# Patient Record
Sex: MALE | Race: WHITE | Employment: OTHER | URBAN - METROPOLITAN AREA
[De-identification: names, ages, dates, MRNs, and addresses within clinical notes are randomized per-mention and may not be internally consistent; named-entity substitution may affect disease eponyms.]

---

## 2017-02-06 ENCOUNTER — OFFICE VISIT (OUTPATIENT)
Dept: SLEEP MEDICINE | Facility: CLINIC | Age: 62
End: 2017-02-06
Payer: COMMERCIAL

## 2017-02-06 VITALS
OXYGEN SATURATION: 93 % | HEIGHT: 73 IN | WEIGHT: 255 LBS | SYSTOLIC BLOOD PRESSURE: 136 MMHG | HEART RATE: 62 BPM | DIASTOLIC BLOOD PRESSURE: 84 MMHG | BODY MASS INDEX: 33.8 KG/M2

## 2017-02-06 DIAGNOSIS — G47.33 OSA (OBSTRUCTIVE SLEEP APNEA): Primary | ICD-10-CM

## 2017-02-06 PROCEDURE — 99213 OFFICE O/P EST LOW 20 MIN: CPT | Performed by: INTERNAL MEDICINE

## 2017-02-06 NOTE — NURSING NOTE
"Chief Complaint   Patient presents with     RECHECK     f/u cpap       Initial Ht 1.854 m (6' 0.99\")  Wt 115.667 kg (255 lb)  BMI 33.65 kg/m2 Estimated body mass index is 33.65 kg/(m^2) as calculated from the following:    Height as of this encounter: 1.854 m (6' 0.99\").    Weight as of this encounter: 115.667 kg (255 lb).  Medication Reconciliation: complete       Lilo Soto LPN  "

## 2017-02-06 NOTE — PROGRESS NOTES
Name: Humberto Pino MRN# [id number]   Age: 61 year old YOB: 1955     Date : February 6, 2017  Primary care provider: Simeon Greenfield           Chief Complaint:    Sleep apnea           History of Present Illness:     Humberto Pino is a 61 year old male who presents for a follow up of sleep apnea. He was diagnosed with sleep apnea in 2008 PSG from 7/23/2008 done at [place] showed AHI of 5.1 per hour. He has been on CPAP therapy since then. Currently he is on auto PAP therapy with pressure range of 8-15 cm H2O.     Patient reports that his poor sleep was multifactorial with psychophysiologic insomnia related to job stress and positional discomfort with certain sleep positions. 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 His sleep quality has improved. He has also been on a diet for last 6 weeks restricting carbohydrate. He has lost 25 pounds apparently and has better sleep quality and less GI discomfort.     Patient reports that using CPAP benefits him. He has better sleep quality and daytime function when he uses CPAP. At this time, he wants to continue therapy.     Download from his CPAP was reviewed. He demonstrated regular compliance 97% of last 30 days with use greater than 4 hrs. Average use is 8 hrs 24 minutes. Auto PAP 95th percentile pressure is 14.4 cm H2O. Residual AHI is 0.3 per hour.          Medications:     Current Outpatient Prescriptions   Medication Sig     UNABLE TO FIND as needed MEDICATION NAME: Smooth Moves     IBUPROFEN PO Take 200 mg by mouth as needed for moderate pain     ORDER FOR DME Use CPAP as directed by your provider     Fiber TABS 2 tablets as needed      fish oil-omega-3 fatty acids (OMEGA 3) 1000 MG capsule Take 1 g by mouth daily.      azithromycin (ZITHROMAX) 250 MG tablet Two tablets first day, then one tablet daily for four days.     benzonatate (TESSALON) 200 MG capsule Take 1 capsule (200 mg) by mouth 3 times daily as needed for  "cough     Multiple Vitamin (MULTI VITAMIN MENS PO) Take  by mouth.     No current facility-administered medications for this visit.        Allergies   Allergen Reactions     Hay Fever & [A.R.M.] Other (See Comments)     Sneezing & stuffy nose            Past Medical History:     Does not need 02 supplement at night   Past Medical History   Diagnosis Date     Diverticulosis of colon (without mention of hemorrhage)      repeat 2012     ADINA (obstructive sleep apnea)              Past Surgical History:    No h/o  upper airway surgery  Past Surgical History   Procedure Laterality Date     C repair cruciate ligament,knee       both knees-ACL     Hernia repair, umbilical       Toe surgery       feb 2006     Arthroscopy knee  8/4/2011     Procedure:ARTHROSCOPY KNEE; With debridement of medial and lateral meniscus  ; Surgeon:AMARILIS PETERS; Location[facility]            Physical Examination:   /84 mmHg  Pulse 62  Ht 1.854 m (6' 0.99\")  Wt 115.667 kg (255 lb)  BMI 33.65 kg/m2  SpO2 93%              Assessment and Plan:        Obstructive sleep apnea    - Sleep apnea is adequately treated on current CPAP setting. Download demonstrates normal residual AHI and regular use. Continuation of therapy is recommended.     - He was counseled regarding the download findings, Link between weight loss and sleep disordered breathing was reviewed.     _ He can follow up in a year     I spent a total of 20 minutes with this patient with more than 50% in counseling regarding sleep apnea.     Costa Mayer MD, MD 2/6/2017     "

## 2017-02-06 NOTE — PATIENT INSTRUCTIONS
Your BMI is Body mass index is 33.65 kg/(m^2).  Weight management is a personal decision.  If you are interested in exploring weight loss strategies, the following discussion covers the approaches that may be successful. Body mass index (BMI) is one way to tell whether you are at a healthy weight, overweight, or obese. It measures your weight in relation to your height.  A BMI of 18.5 to 24.9 is in the healthy range. A person with a BMI of 25 to 29.9 is considered overweight, and someone with a BMI of 30 or greater is considered obese. More than two-thirds of American adults are considered overweight or obese.  Being overweight or obese increases the risk for further weight gain. Excess weight may lead to heart disease and diabetes.  Creating and following plans for healthy eating and physical activity may help you improve your health.  Weight control is part of healthy lifestyle and includes exercise, emotional health, and healthy eating habits. Careful eating habits lifelong are the mainstay of weight control. Though there are significant health benefits from weight loss, long-term weight loss with diet alone may be very difficult to achieve- studies show long-term success with dietary management in less than 10% of people. Attaining a healthy weight may be especially difficult to achieve in those with severe obesity. In some cases, medications, devices and surgical management might be considered.  What can you do?  If you are overweight or obese and are interested in methods for weight loss, you should discuss this with your provider.     Consider reducing daily calorie intake by 500 calories.     Keep a food journal.     Avoiding skipping meals, consider cutting portions instead.    Diet combined with exercise helps maintain muscle while optimizing fat loss. Strength training is particularly important for building and maintaining muscle mass. Exercise helps reduce stress, increase energy, and improves fitness.  Increasing exercise without diet control, however, may not burn enough calories to loose weight.       Start walking three days a week 10-20 minutes at a time    Work towards walking thirty minutes five days a week     Eventually, increase the speed of your walking for 1-2 minutes at time    In addition, we recommend that you review healthy lifestyles and methods for weight loss available through the National Institutes of Health patient information sites:  http://win.niddk.nih.gov/publications/index.htm    And look into health and wellness programs that may be available through your health insurance provider, employer, local community center, or darryl club.    Weight management plan: Patient was referred to their PCP to discuss a diet and exercise plan.     Your blood pressure was checked while you were in clinic today.  Please read the guidelines below about what these numbers mean and what you should do about them.  Your systolic blood pressure is the top number.  This is the pressure when the heart is pumping.  Your diastolic blood pressure is the bottom number.  This is the pressure in between beats.  If your systolic blood pressure is less than 120 and your diastolic blood pressure is less than 80, then your blood pressure is normal. There is nothing more that you need to do about it  If your systolic blood pressure is 120-139 or your diastolic blood pressure is 80-89, your blood pressure may be higher than it should be.  You should have your blood pressure re-checked within a year by a primary care provider.  If your systolic blood pressure is 140 or greater or your diastolic blood pressure is 90 or greater, you may have high blood pressure.  High blood pressure is treatable, but if left untreated over time it can put you at risk for heart attack, stroke, or kidney failure.  You should have your blood pressure re-checked by a primary care provider within the next four weeks.

## 2017-09-09 ENCOUNTER — HEALTH MAINTENANCE LETTER (OUTPATIENT)
Age: 62
End: 2017-09-09

## 2018-05-21 DIAGNOSIS — G47.33 OSA (OBSTRUCTIVE SLEEP APNEA): Primary | ICD-10-CM

## 2019-09-26 ENCOUNTER — OFFICE VISIT (OUTPATIENT)
Dept: SLEEP MEDICINE | Facility: CLINIC | Age: 64
End: 2019-09-26
Payer: COMMERCIAL

## 2019-09-26 VITALS
SYSTOLIC BLOOD PRESSURE: 135 MMHG | DIASTOLIC BLOOD PRESSURE: 86 MMHG | HEIGHT: 72 IN | RESPIRATION RATE: 16 BRPM | OXYGEN SATURATION: 94 % | BODY MASS INDEX: 34.95 KG/M2 | WEIGHT: 258 LBS | HEART RATE: 70 BPM

## 2019-09-26 DIAGNOSIS — G47.33 OSA (OBSTRUCTIVE SLEEP APNEA): Primary | ICD-10-CM

## 2019-09-26 PROCEDURE — 99213 OFFICE O/P EST LOW 20 MIN: CPT | Performed by: INTERNAL MEDICINE

## 2019-09-26 ASSESSMENT — MIFFLIN-ST. JEOR: SCORE: 2003.28

## 2019-09-26 NOTE — PROGRESS NOTES
Obstructive Sleep Apnea - PAP Follow-Up Visit:    Chief Complaint   Patient presents with     CPAP Follow Up       Humberto Pino comes in today for follow-up of their mild sleep apnea, managed with CPAP.      ADINA was diagnosed at [place] [facility] in 2008 and showed a baseline AHI of 5.1 per hour. He has been on PAP therapy since then,.     Overall, he rates the experience with PAP as 9 (0 poor, 10 great). The mask is comfortable. The mask is not leaking.  He is not snoring with the mask on. He is not having gasp arousals.  He is not having significant oral/nasal dryness. The pressure settings are comfortable.     He uses full-face mask.     Bedtime is typically 9 pm. Usually it takes about 10 minutes to fall asleep with the mask on. Wake time is typically 5:30 am.  Patient is using PAP therapy 9 hours per night. The patient is usually getting 9 hours of sleep per night.    He does feel rested in the morning.    Total score - [place]: 5 (9/26/2019  3:11 PM)    ResMed     Auto-PAP 8-15 cmH2O download:  30/30 total days of use. 0 nonuse days. 30/30 days with >4 hours use.  Average use 8 hours 57 minutes per day. Median Leak 0 L/min. 95%ile Leak 0.4 L/min. CPAP 95% pressure 14.9cm. AHI 1.7    Reviewed by team: Tobacco  Allergies  Meds       Reviewed by provider:        Problem List:  Patient Active Problem List    Diagnosis Date Noted     Stress at work 12/31/2014     Priority: Medium     BMI 30-35 07/31/2014     Priority: Medium     Hallux varus 08/14/2013     Priority: Medium     Advanced directives, counseling/discussion 01/30/2012     Priority: Medium     ADINA (obstructive sleep apnea) 08/12/2008     Priority: Medium          BP (!) 142/90   Pulse 70   Resp 16   Ht 1.829 m (6')   Wt 117 kg (258 lb)   SpO2 94%   BMI 34.99 kg/m      Impression/Plan:     Mild sleep apnea.     -  Tolerating PAP well. Daytime symptoms are improved. Regular compliance and normal AHI is demonstrated on download.     -  Although sleep apnea was mild at baseline, patient has benefited significantly from therapy and will continue PAP therapy.     Plan:     1. Continue auto PAP therapy     Humberto Pino will follow up in about 1 year(s).     Fifteen minutes spent with patient, all of which were spent face-to-face counseling, consulting, coordinating plan of care.      Costa Mayer MD, MD    CC:  Simeon Greenfield,

## 2019-09-26 NOTE — NURSING NOTE
Chief Complaint   Patient presents with     CPAP Follow Up       Initial BP (!) 142/90   Pulse 70   Resp 16   Ht 1.829 m (6')   Wt 117 kg (258 lb)   SpO2 94%   BMI 34.99 kg/m   Estimated body mass index is 34.99 kg/m  as calculated from the following:    Height as of this encounter: 1.829 m (6').    Weight as of this encounter: 117 kg (258 lb).    Medication Reconciliation: complete     ESS 5  Terrie Meyer MA

## 2019-09-26 NOTE — PATIENT INSTRUCTIONS
Your BMI is Body mass index is 34.99 kg/m .  Weight management is a personal decision.  If you are interested in exploring weight loss strategies, the following discussion covers the approaches that may be successful. Body mass index (BMI) is one way to tell whether you are at a healthy weight, overweight, or obese. It measures your weight in relation to your height.  A BMI of 18.5 to 24.9 is in the healthy range. A person with a BMI of 25 to 29.9 is considered overweight, and someone with a BMI of 30 or greater is considered obese. More than two-thirds of American adults are considered overweight or obese.  Being overweight or obese increases the risk for further weight gain. Excess weight may lead to heart disease and diabetes.  Creating and following plans for healthy eating and physical activity may help you improve your health.  Weight control is part of healthy lifestyle and includes exercise, emotional health, and healthy eating habits. Careful eating habits lifelong are the mainstay of weight control. Though there are significant health benefits from weight loss, long-term weight loss with diet alone may be very difficult to achieve- studies show long-term success with dietary management in less than 10% of people. Attaining a healthy weight may be especially difficult to achieve in those with severe obesity. In some cases, medications, devices and surgical management might be considered.  What can you do?  If you are overweight or obese and are interested in methods for weight loss, you should discuss this with your provider.     Consider reducing daily calorie intake by 500 calories.     Keep a food journal.     Avoiding skipping meals, consider cutting portions instead.    Diet combined with exercise helps maintain muscle while optimizing fat loss. Strength training is particularly important for building and maintaining muscle mass. Exercise helps reduce stress, increase energy, and improves fitness.  Increasing exercise without diet control, however, may not burn enough calories to loose weight.       Start walking three days a week 10-20 minutes at a time    Work towards walking thirty minutes five days a week     Eventually, increase the speed of your walking for 1-2 minutes at time    In addition, we recommend that you review healthy lifestyles and methods for weight loss available through the National Institutes of Health patient information sites:  http://win.niddk.nih.gov/publications/index.htm    And look into health and wellness programs that may be available through your health insurance provider, employer, local community center, or darryl club.    Weight management plan: Patient was referred to their PCP to discuss a diet and exercise plan.        Your Body mass index is 34.99 kg/m .  Weight management is a personal decision.  If you are interested in exploring weight loss strategies, the following discussion covers the approaches that may be successful. Body mass index (BMI) is one way to tell whether you are at a healthy weight, overweight, or obese. It measures your weight in relation to your height.  A BMI of 18.5 to 24.9 is in the healthy range. A person with a BMI of 25 to 29.9 is considered overweight, and someone with a BMI of 30 or greater is considered obese. More than two-thirds of American adults are considered overweight or obese.  Being overweight or obese increases the risk for further weight gain. Excess weight may lead to heart disease and diabetes.  Creating and following plans for healthy eating and physical activity may help you improve your health.  Weight control is part of healthy lifestyle and includes exercise, emotional health, and healthy eating habits. Careful eating habits lifelong are the mainstay of weight control. Though there are significant health benefits from weight loss, long-term weight loss with diet alone may be very difficult to achieve- studies show long-term  success with dietary management in less than 10% of people. Attaining a healthy weight may be especially difficult to achieve in those with severe obesity. In some cases, medications, devices and surgical management might be considered.  What can you do?  If you are overweight or obese and are interested in methods for weight loss, you should discuss this with your provider.     Consider reducing daily calorie intake by 500 calories.     Keep a food journal.     Avoiding skipping meals, consider cutting portions instead.    Diet combined with exercise helps maintain muscle while optimizing fat loss. Strength training is particularly important for building and maintaining muscle mass. Exercise helps reduce stress, increase energy, and improves fitness. Increasing exercise without diet control, however, may not burn enough calories to loose weight.       Start walking three days a week 10-20 minutes at a time    Work towards walking thirty minutes five days a week     Eventually, increase the speed of your walking for 1-2 minutes at time    In addition, we recommend that you review healthy lifestyles and methods for weight loss available through the National Institutes of Health patient information sites:  http://win.niddk.nih.gov/publications/index.htm    And look into health and wellness programs that may be available through your health insurance provider, employer, local community center, or darryl club.    {Weight Management Plan -- Delete if patient has a normal BMI:[id number]}

## 2019-10-01 ENCOUNTER — HEALTH MAINTENANCE LETTER (OUTPATIENT)
Age: 64
End: 2019-10-01

## 2019-11-26 ENCOUNTER — TRANSFERRED RECORDS (OUTPATIENT)
Dept: HEALTH INFORMATION MANAGEMENT | Facility: CLINIC | Age: 64
End: 2019-11-26

## 2020-06-29 ENCOUNTER — TELEPHONE (OUTPATIENT)
Dept: SLEEP MEDICINE | Facility: CLINIC | Age: 65
End: 2020-06-29

## 2020-06-30 NOTE — TELEPHONE ENCOUNTER
Patient contacted on the phone.     Patient has mild sleep apnea at baseline. He should be ok to take a break from CPAP until he feels better with neck pain.

## 2021-01-15 ENCOUNTER — HEALTH MAINTENANCE LETTER (OUTPATIENT)
Age: 66
End: 2021-01-15

## 2021-05-13 DIAGNOSIS — G47.33 OBSTRUCTIVE SLEEP APNEA (ADULT) (PEDIATRIC): Primary | ICD-10-CM

## 2021-06-14 ENCOUNTER — VIRTUAL VISIT (OUTPATIENT)
Dept: SLEEP MEDICINE | Facility: CLINIC | Age: 66
End: 2021-06-14
Payer: MEDICARE

## 2021-06-14 VITALS — BODY MASS INDEX: 32.47 KG/M2 | WEIGHT: 245 LBS | HEIGHT: 73 IN

## 2021-06-14 DIAGNOSIS — G47.33 OSA (OBSTRUCTIVE SLEEP APNEA): Primary | ICD-10-CM

## 2021-06-14 PROCEDURE — 99213 OFFICE O/P EST LOW 20 MIN: CPT | Mod: GT | Performed by: INTERNAL MEDICINE

## 2021-06-14 ASSESSMENT — MIFFLIN-ST. JEOR: SCORE: 1950.19

## 2021-06-14 NOTE — PROGRESS NOTES
Does patient have any form of state insurance? N    Do you have wifi? Y  Do you have a smart phone? Y  Can you download an marlen on your phone comfortably with out assistance? Y  Can you watch a Youtube video? Y    Christian is a 66 year old who is being evaluated via a billable video visit.      How would you like to obtain your AVS? MyChart  If the video visit is dropped, the invitation should be resent by: Send to e-mail at: [email or web address]  Will anyone else be joining your video visit? No       Aminata Gastelum, CMA on 6/14/2021 at 9:59 AM    Video Start Time: 11:35 AM  Video-Visit Details    Type of service:  Video Visit    Video End Time:11:55 PM    Originating Location (pt. Location): Home    Distant Location (provider location):  [facility]     Platform used for Video Visit: Parudi    Obstructive Sleep Apnea - PAP Follow-Up Visit:    Chief Complaint   Patient presents with     CPAP Follow Up       Humberto Pino comes in today for follow-up of their mild sleep apnea, managed with CPAP.     ADINA was diagnosed at [facility] sleep clinic in 2008 and showed a baseline AHI of 5.1 per hour. He has been on PAP therapy since then,.     Overall, he rates the experience with PAP as 10 (0 poor, 10 great). The mask is comfortable. The mask is not leaking.  He is not snoring with the mask on. He is not having gasp arousals.  He is not having significant oral/nasal dryness. The pressure settings are comfortable.     He uses full-face mask.     Bedtime is typically 8:30 PM. Usually it takes about 10 minutes to fall asleep with the mask on. Wake time is typically 6 am.  Patient is using PAP therapy 8-9 hours per night. The patient is usually getting 9 hours of sleep per night.    He does feel rested in the morning.    Total score - [place]: 7 (6/10/2021  2:39 PM)    ResMed      Auto-PAP 8-15 cmH2O download:  30/30 total days of use. 0 nonuse days. 100% days with >4 hours use.  Average use 8 hours  "42 minutes per day. Median Leak 0 L/min. 95%ile Leak 3.4 L/min. CPAP 95% pressure 14.9cm. AHI 1.2    Reviewed by team: Tobacco  Allergies  Meds            Reviewed by provider:                Problem List:  Patient Active Problem List    Diagnosis Date Noted     Stress at work 12/31/2014     Priority: Medium     BMI 30-35 07/31/2014     Priority: Medium     Hallux varus 08/14/2013     Priority: Medium     Advanced directives, counseling/discussion 01/30/2012     Priority: Medium     ADINA (obstructive sleep apnea) 08/12/2008     Priority: Medium          Ht 1.854 m (6' 1\")   Wt 111.1 kg (245 lb)   BMI 32.32 kg/m      Impression/Plan:     Mild sleep apnea.     -  Patient has been on successful CPAP therapy since 2008. Although sleep apnea was mild at baseline, he has benefited from treatment and is regularly adherent to therapy. I reviewed data from his CPAP which shows regular compliance and normal AHI.     - He is currently following up with Orthopedics for trigger finger & pain is a disruptor to his sleep.     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     Plan:     1. Continue auto PAP therapy     Humberto Pino will follow up in about 1 year(s).     I spent a total of 25 minutes for this appointment today which include time spent before, during and after the visit for patient care, counseling and coordination of care.      Costa Mayer MD,    CC:  Simeon Greenfield,   "

## 2021-06-14 NOTE — PATIENT INSTRUCTIONS
Your BMI is Body mass index is 32.32 kg/m .  Weight management is a personal decision.  If you are interested in exploring weight loss strategies, the following discussion covers the approaches that may be successful. Body mass index (BMI) is one way to tell whether you are at a healthy weight, overweight, or obese. It measures your weight in relation to your height.  A BMI of 18.5 to 24.9 is in the healthy range. A person with a BMI of 25 to 29.9 is considered overweight, and someone with a BMI of 30 or greater is considered obese. More than two-thirds of American adults are considered overweight or obese.  Being overweight or obese increases the risk for further weight gain. Excess weight may lead to heart disease and diabetes.  Creating and following plans for healthy eating and physical activity may help you improve your health.  Weight control is part of healthy lifestyle and includes exercise, emotional health, and healthy eating habits. Careful eating habits lifelong are the mainstay of weight control. Though there are significant health benefits from weight loss, long-term weight loss with diet alone may be very difficult to achieve- studies show long-term success with dietary management in less than 10% of people. Attaining a healthy weight may be especially difficult to achieve in those with severe obesity. In some cases, medications, devices and surgical management might be considered.  What can you do?  If you are overweight or obese and are interested in methods for weight loss, you should discuss this with your provider.     Consider reducing daily calorie intake by 500 calories.     Keep a food journal.     Avoiding skipping meals, consider cutting portions instead.    Diet combined with exercise helps maintain muscle while optimizing fat loss. Strength training is particularly important for building and maintaining muscle mass. Exercise helps reduce stress, increase energy, and improves fitness.  Increasing exercise without diet control, however, may not burn enough calories to loose weight.       Start walking three days a week 10-20 minutes at a time    Work towards walking thirty minutes five days a week     Eventually, increase the speed of your walking for 1-2 minutes at time    In addition, we recommend that you review healthy lifestyles and methods for weight loss available through the National Institutes of Health patient information sites:  http://win.niddk.nih.gov/publications/index.htm    And look into health and wellness programs that may be available through your health insurance provider, employer, local community center, or darryl club.    Weight management plan: Patient was referred to their PCP to discuss a diet and exercise plan.        Your Body mass index is 32.32 kg/m .  Weight management is a personal decision.  If you are interested in exploring weight loss strategies, the following discussion covers the approaches that may be successful. Body mass index (BMI) is one way to tell whether you are at a healthy weight, overweight, or obese. It measures your weight in relation to your height.  A BMI of 18.5 to 24.9 is in the healthy range. A person with a BMI of 25 to 29.9 is considered overweight, and someone with a BMI of 30 or greater is considered obese. More than two-thirds of American adults are considered overweight or obese.  Being overweight or obese increases the risk for further weight gain. Excess weight may lead to heart disease and diabetes.  Creating and following plans for healthy eating and physical activity may help you improve your health.  Weight control is part of healthy lifestyle and includes exercise, emotional health, and healthy eating habits. Careful eating habits lifelong are the mainstay of weight control. Though there are significant health benefits from weight loss, long-term weight loss with diet alone may be very difficult to achieve- studies show long-term  success with dietary management in less than 10% of people. Attaining a healthy weight may be especially difficult to achieve in those with severe obesity. In some cases, medications, devices and surgical management might be considered.  What can you do?  If you are overweight or obese and are interested in methods for weight loss, you should discuss this with your provider.     Consider reducing daily calorie intake by 500 calories.     Keep a food journal.     Avoiding skipping meals, consider cutting portions instead.    Diet combined with exercise helps maintain muscle while optimizing fat loss. Strength training is particularly important for building and maintaining muscle mass. Exercise helps reduce stress, increase energy, and improves fitness. Increasing exercise without diet control, however, may not burn enough calories to loose weight.       Start walking three days a week 10-20 minutes at a time    Work towards walking thirty minutes five days a week     Eventually, increase the speed of your walking for 1-2 minutes at time    In addition, we recommend that you review healthy lifestyles and methods for weight loss available through the National Institutes of Health patient information sites:  http://win.niddk.nih.gov/publications/index.htm    And look into health and wellness programs that may be available through your health insurance provider, employer, local community center, or darryl club.    {Weight Management Plan -- Delete if patient has a normal BMI:[id number]}

## 2021-07-10 ENCOUNTER — HEALTH MAINTENANCE LETTER (OUTPATIENT)
Age: 66
End: 2021-07-10

## 2021-09-04 ENCOUNTER — HEALTH MAINTENANCE LETTER (OUTPATIENT)
Age: 66
End: 2021-09-04

## 2022-02-11 ASSESSMENT — ACTIVITIES OF DAILY LIVING (ADL): CURRENT_FUNCTION: NO ASSISTANCE NEEDED

## 2022-02-16 ENCOUNTER — OFFICE VISIT (OUTPATIENT)
Dept: INTERNAL MEDICINE | Facility: CLINIC | Age: 67
End: 2022-02-16
Payer: COMMERCIAL

## 2022-02-16 VITALS
OXYGEN SATURATION: 96 % | HEART RATE: 64 BPM | BODY MASS INDEX: 30.75 KG/M2 | DIASTOLIC BLOOD PRESSURE: 62 MMHG | HEIGHT: 73 IN | WEIGHT: 232 LBS | TEMPERATURE: 98.6 F | SYSTOLIC BLOOD PRESSURE: 122 MMHG

## 2022-02-16 DIAGNOSIS — Z12.11 SCREEN FOR COLON CANCER: ICD-10-CM

## 2022-02-16 DIAGNOSIS — Z23 IMMUNIZATION DUE: ICD-10-CM

## 2022-02-16 DIAGNOSIS — Z00.00 ENCOUNTER FOR MEDICARE ANNUAL WELLNESS EXAM: ICD-10-CM

## 2022-02-16 DIAGNOSIS — Z13.220 SCREENING FOR HYPERLIPIDEMIA: ICD-10-CM

## 2022-02-16 DIAGNOSIS — Z00.00 WELCOME TO MEDICARE PREVENTIVE VISIT: Primary | ICD-10-CM

## 2022-02-16 DIAGNOSIS — G47.33 OSA (OBSTRUCTIVE SLEEP APNEA): ICD-10-CM

## 2022-02-16 DIAGNOSIS — Z11.4 SCREENING FOR HIV (HUMAN IMMUNODEFICIENCY VIRUS): ICD-10-CM

## 2022-02-16 LAB
ALBUMIN SERPL-MCNC: 4.3 G/DL (ref 3.5–5)
ALP SERPL-CCNC: 59 U/L (ref 45–120)
ALT SERPL W P-5'-P-CCNC: 17 U/L (ref 0–45)
ANION GAP SERPL CALCULATED.3IONS-SCNC: 9 MMOL/L (ref 5–18)
AST SERPL W P-5'-P-CCNC: 22 U/L (ref 0–40)
BILIRUB SERPL-MCNC: 0.9 MG/DL (ref 0–1)
BUN SERPL-MCNC: 14 MG/DL (ref 8–22)
CALCIUM SERPL-MCNC: 9.7 MG/DL (ref 8.5–10.5)
CHLORIDE BLD-SCNC: 103 MMOL/L (ref 98–107)
CHOLEST SERPL-MCNC: 142 MG/DL
CO2 SERPL-SCNC: 28 MMOL/L (ref 22–31)
CREAT SERPL-MCNC: 1 MG/DL (ref 0.7–1.3)
FASTING STATUS PATIENT QL REPORTED: NO
GFR SERPL CREATININE-BSD FRML MDRD: 84 ML/MIN/1.73M2
GLUCOSE BLD-MCNC: 89 MG/DL (ref 70–125)
HDLC SERPL-MCNC: 48 MG/DL
HIV 1+2 AB+HIV1 P24 AG SERPL QL IA: NEGATIVE
LDLC SERPL CALC-MCNC: 79 MG/DL
POTASSIUM BLD-SCNC: 4.1 MMOL/L (ref 3.5–5)
PROT SERPL-MCNC: 7.9 G/DL (ref 6–8)
SODIUM SERPL-SCNC: 140 MMOL/L (ref 136–145)
TRIGL SERPL-MCNC: 75 MG/DL

## 2022-02-16 PROCEDURE — G0404 EKG TRACING FOR INITIAL PREV: HCPCS | Performed by: INTERNAL MEDICINE

## 2022-02-16 PROCEDURE — 36415 COLL VENOUS BLD VENIPUNCTURE: CPT | Performed by: INTERNAL MEDICINE

## 2022-02-16 PROCEDURE — G0009 ADMIN PNEUMOCOCCAL VACCINE: HCPCS | Performed by: INTERNAL MEDICINE

## 2022-02-16 PROCEDURE — G0405 EKG INTERPRET & REPORT PREVE: HCPCS | Performed by: INTERNAL MEDICINE

## 2022-02-16 PROCEDURE — 80053 COMPREHEN METABOLIC PANEL: CPT | Performed by: INTERNAL MEDICINE

## 2022-02-16 PROCEDURE — 80061 LIPID PANEL: CPT | Performed by: INTERNAL MEDICINE

## 2022-02-16 PROCEDURE — G0402 INITIAL PREVENTIVE EXAM: HCPCS | Performed by: INTERNAL MEDICINE

## 2022-02-16 PROCEDURE — 90732 PPSV23 VACC 2 YRS+ SUBQ/IM: CPT | Performed by: INTERNAL MEDICINE

## 2022-02-16 PROCEDURE — 87389 HIV-1 AG W/HIV-1&-2 AB AG IA: CPT | Performed by: INTERNAL MEDICINE

## 2022-02-16 ASSESSMENT — ACTIVITIES OF DAILY LIVING (ADL): CURRENT_FUNCTION: NO ASSISTANCE NEEDED

## 2022-02-16 NOTE — PATIENT INSTRUCTIONS
Patient Education   Personalized Prevention Plan  You are due for the preventive services outlined below.  Your care team is available to assist you in scheduling these services.  If you have already completed any of these items, please share that information with your care team to update in your medical record.  Health Maintenance Due   Topic Date Due     ANNUAL REVIEW OF HM ORDERS  Never done     HIV Screening  Never done     Colorectal Cancer Screening  07/05/2017     Cholesterol Lab  04/22/2021     Pneumococcal Vaccine (1 of 1 - PPSV23) Never done     AORTIC ANEURYSM SCREENING (SYSTEM ASSIGNED)  Never done       Urinary Incontinence (Male)    Urinary incontinence means not being able to control the release of urine from the bladder.   Causes  Common causes of urinary incontinence in men include:    Infection    Certain medicines    Aging    Poor pelvic muscle tone    Bladder spasms    Obesity    Trouble urinating and fully emptying the bladder (urinary retention)  Other things that can cause incontinence are:     Nervous system diseases    Diabetes    Sleep apnea    Urinary tract infections    Prostate surgery    Pelvic injury  Constipation and smoking have also been identified as risk factors.   Symptoms    Urge incontinence (overactive bladder). This is a sudden urge to urinate. It occurs even though there may not be much urine in the bladder. The need to urinate often during the night is common. It's due to bladder spasms.    Stress incontinence. This is urine leakage that you can't control. It can occur with sneezing, coughing, and other actions that put stress on the bladder.    Treatment  Treatment depends on what is causing the condition. Bladder infections are treated with antibiotics. Urinary retention is treated with a bladder catheter.   Home care  Follow these guidelines when caring for yourself at home:    Don't have any foods and drinks that may irritate the bladder. This  includes:  ? Chocolate  ? Alcohol  ? Caffeine  ? Carbonated drinks  ? Acidic fruits and juices    Limit fluids to 6 to 8 cups a day.    Lose weight if you are overweight. This will reduce your symptoms.    If advised, do regular pelvic muscle-strengthening exercises such as Kegel exercises.    If needed, wear absorbent pads to catch urine. Change the pads often. This is for good hygiene and to prevent skin and bladder infections.    Bathe daily for good hygiene.    If an antibiotic was prescribed to treat a bladder infection, take it until it's finished. Keep taking it even if you are feeling better. This is to make sure your infection has cleared.    If a catheter was left in place, keep bacteria from getting into the collection bag. Don't disconnect the catheter from the collection bag.    Use a leg band to secure the catheter drainage tube, so it does not pull on the catheter. Drain the collection bag when it becomes full. To do this, use the drain spout at the bottom of the bag. Don't disconnect the bag from the catheter.    Don't pull on or try to remove a catheter. The catheter must be removed by a healthcare provider.    If you smoke, stop. Ask your provider for help if you can't do this on your own.  Follow-up care  Follow up with your healthcare provider, or as advised.  When to get medical advice  Call your healthcare provider right away if any of these occur:    Fever over 100.4 F (38 C), or as directed by your provider    Bladder pain or fullness    Belly swelling, nausea, or vomiting    Back pain    Weakness, dizziness, or fainting    If a catheter was left in place, return if:  ? The catheter falls out  ? The catheter stops draining for 6 hours  ? Your urine gets cloudy or smells bad  Jesús last reviewed this educational content on 1/1/2020 2000-2021 [employer]. All rights reserved. This information is not intended as a substitute for professional medical care. Always follow your  healthcare professional's instructions.

## 2022-02-16 NOTE — PROGRESS NOTES
"SUBJECTIVE:   Humberto Pino is a 66 year old male who presents for Preventive Visit.      Patient has been advised of split billing requirements and indicates understanding: Yes  Are you in the first 12 months of your Medicare coverage?  Yes,  Visual Acuity:  Right Eye: 20/25   Left Eye: 20/25  Both Eyes: 20/20    Healthy Habits:     In general, how would you rate your overall health?  Good    Frequency of exercise:  2-3 days/week    Duration of exercise:  15-30 minutes    Do you usually eat at least 4 servings of fruit and vegetables a day, include whole grains    & fiber and avoid regularly eating high fat or \"junk\" foods?  Yes    Taking medications regularly:  Yes    Medication side effects:  None    Ability to successfully perform activities of daily living:  No assistance needed    Home Safety:  Lack of grab bars in the bathroom    Hearing Impairment:  No hearing concerns    In the past 6 months, have you been bothered by leaking of urine? Yes    In general, how would you rate your overall mental or emotional health?  Good      PHQ-2 Total Score: 0    Additional concerns today:  Yes    Do you feel safe in your environment? Yes    Have you ever done Advance Care Planning? (For example, a Health Directive, POLST, or a discussion with a medical provider or your loved ones about your wishes): No, advance care planning information given to patient to review.  Advanced care planning was discussed at today's visit.       Fall risk  Fallen 2 or more times in the past year?: No  Any fall with injury in the past year?: No    Cognitive Screening   1) Repeat 3 items (Leader, Season, Table)    2) Clock draw: NORMAL  3) 3 item recall: Recalls 3 objects  Results: NORMAL clock, 1-2 items recalled: COGNITIVE IMPAIRMENT LESS LIKELY    Mini-CogTM Copyright MARÍA Hernandez. Licensed by the author for use in [facility]; reprinted with permission ([email or web address]@.[email or web address]). All rights reserved.      Do you have sleep apnea, excessive " "snoring or daytime drowsiness?: yes    Reviewed and updated as needed this visit by clinical staff   Tobacco                Reviewed and updated as needed this visit by Provider                 Social History     Tobacco Use     Smoking status: Never Smoker     Smokeless tobacco: Never Used   Substance Use Topics     Alcohol use: Yes     Alcohol/week: 0.0 standard drinks     Comment: once/week     If you drink alcohol do you typically have >3 drinks per day or >7 drinks per week? No    Alcohol Use 2/11/2022   Prescreen: >3 drinks/day or >7 drinks/week? No   Prescreen: >3 drinks/day or >7 drinks/week? -       Current providers sharing in care for this patient include:   Patient Care Team:  Rajiv Ludwig MD as PCP - General (Internal Medicine)  Costa Mayer MD as Assigned Sleep Provider    The following health maintenance items are reviewed in Epic and correct as of today:  Health Maintenance Due   Topic Date Due     ANNUAL REVIEW OF  ORDERS  Never done     HIV SCREENING  Never done     ADVANCE CARE PLANNING  01/30/2017     COLORECTAL CANCER SCREENING  07/05/2017     LIPID  04/22/2021     MEDICARE ANNUAL WELLNESS VISIT  05/06/2021     FALL RISK ASSESSMENT  Never done     Pneumococcal Vaccine: 65+ Years (1 of 1 - PPSV23) Never done     AORTIC ANEURYSM SCREENING (SYSTEM ASSIGNED)  Never done               Review of Systems  Constitutional, HEENT, cardiovascular, pulmonary, GI, , musculoskeletal, neuro, skin, endocrine and psych systems are negative, except as otherwise noted.    OBJECTIVE:   /62 (BP Location: Left arm, Patient Position: Sitting, Cuff Size: Adult Small)   Pulse 64   Temp 98.6  F (37  C)   Ht 1.842 m (6' 0.5\")   Wt 105.2 kg (232 lb)   SpO2 96%   BMI 31.03 kg/m   Estimated body mass index is 31.03 kg/m  as calculated from the following:    Height as of this encounter: 1.842 m (6' 0.5\").    Weight as of this encounter: 105.2 kg (232 lb).  Physical Exam  GENERAL: healthy, alert and " "no distress  EYES: Eyes grossly normal to inspection, PERRL and conjunctivae and sclerae normal  HENT: ear canals and TM's normal, nose and mouth without ulcers or lesions  NECK: no adenopathy, no asymmetry, masses, or scars and thyroid normal to palpation  RESP: lungs clear to auscultation - no rales, rhonchi or wheezes  CV: regular rate and rhythm, normal S1 S2, no S3 or S4, no murmur, click or rub, no peripheral edema and peripheral pulses strong  ABDOMEN: soft, nontender, no hepatosplenomegaly, no masses and bowel sounds normal  MS: no gross musculoskeletal defects noted, no edema  SKIN: no suspicious lesions or rashes  NEURO: Normal strength and tone, mentation intact and speech normal  PSYCH: mentation appears normal, affect normal/bright    Diagnostic Test Results:  Labs reviewed in Epic    ASSESSMENT / PLAN:   (Z00.00) Welcome to Medicare preventive visit  (primary encounter diagnosis)  Comment: normal  Plan: EKG 12-lead, tracing only, PNEUMOCOCCAL         VACCINE,ADULT,SQ OR IM ([id number]), Comprehensive        metabolic panel        HCD discussed, information packet provided, he will discuss with wife.  Pertinent labs ordered    (G47.33) ADINA (obstructive sleep apnea)  Comment: CPAP for years  Plan: losing weight, hoping that in the future he may be able to stop using it. His original sleep study showed only mild ADINA per patient.      (Z12.11) Screen for colon cancer  Comment: overdue  Plan: Adult Gastro Ref - Procedure Only        Ordered. discussed    COUNSELING:  Reviewed preventive health counseling, as reflected in patient instructions    Estimated body mass index is 31.03 kg/m  as calculated from the following:    Height as of this encounter: 1.842 m (6' 0.5\").    Weight as of this encounter: 105.2 kg (232 lb).    Weight management plan: Discussed healthy diet and exercise guidelines    He reports that he has never smoked. He has never used smokeless tobacco.      Appropriate preventive services were " discussed with this patient, including applicable screening as appropriate for cardiovascular disease, diabetes, osteopenia/osteoporosis, and glaucoma.  As appropriate for age/gender, discussed screening for colorectal cancer, prostate cancer, breast cancer, and cervical cancer. Checklist reviewing preventive services available has been given to the patient.    Reviewed patients plan of care and provided an AVS. The Basic Care Plan (routine screening as documented in Health Maintenance) for Humberto meets the Care Plan requirement. This Care Plan has been established and reviewed with the Patient.    Counseling Resources:  ATP IV Guidelines  Pooled Cohorts Equation Calculator  Breast Cancer Risk Calculator  Breast Cancer: Medication to Reduce Risk  FRAX Risk Assessment  ICSI Preventive Guidelines  Dietary Guidelines for Americans, 2010  USDA's MyPlate  ASA Prophylaxis  Lung CA Screening    Rajiv Ludwig MD  [facility]    Identified Health Risks:

## 2022-02-17 LAB
ATRIAL RATE - MUSE: 55 BPM
DIASTOLIC BLOOD PRESSURE - MUSE: NORMAL MMHG
INTERPRETATION ECG - MUSE: NORMAL
P AXIS - MUSE: 61 DEGREES
PR INTERVAL - MUSE: 176 MS
QRS DURATION - MUSE: 100 MS
QT - MUSE: 444 MS
QTC - MUSE: 424 MS
R AXIS - MUSE: 10 DEGREES
SYSTOLIC BLOOD PRESSURE - MUSE: NORMAL MMHG
T AXIS - MUSE: 20 DEGREES
VENTRICULAR RATE- MUSE: 55 BPM

## 2022-04-22 ENCOUNTER — IMMUNIZATION (OUTPATIENT)
Dept: NURSING | Facility: CLINIC | Age: 67
End: 2022-04-22
Payer: COMMERCIAL

## 2022-04-22 PROCEDURE — 91306 COVID-19,PF,MODERNA (18+ YRS BOOSTER .25ML): CPT

## 2022-04-22 PROCEDURE — 0064A COVID-19,PF,MODERNA (18+ YRS BOOSTER .25ML): CPT

## 2022-05-02 ENCOUNTER — LAB (OUTPATIENT)
Dept: URGENT CARE | Facility: URGENT CARE | Age: 67
End: 2022-05-02
Attending: INTERNAL MEDICINE

## 2022-05-02 ENCOUNTER — VIRTUAL VISIT (OUTPATIENT)
Dept: INTERNAL MEDICINE | Facility: CLINIC | Age: 67
End: 2022-05-02
Payer: COMMERCIAL

## 2022-05-02 DIAGNOSIS — Z20.822 CLOSE EXPOSURE TO COVID-19 VIRUS: ICD-10-CM

## 2022-05-02 DIAGNOSIS — B97.89 VIRAL RESPIRATORY INFECTION: ICD-10-CM

## 2022-05-02 DIAGNOSIS — J98.8 VIRAL RESPIRATORY INFECTION: ICD-10-CM

## 2022-05-02 DIAGNOSIS — D36.9 ADENOMATOUS POLYP: ICD-10-CM

## 2022-05-02 DIAGNOSIS — K57.30 DIVERTICULOSIS OF LARGE INTESTINE WITHOUT HEMORRHAGE: ICD-10-CM

## 2022-05-02 DIAGNOSIS — Z20.822 CLOSE EXPOSURE TO COVID-19 VIRUS: Primary | ICD-10-CM

## 2022-05-02 PROBLEM — E66.811 OBESITY, CLASS I, BMI 30-34.9: Status: ACTIVE | Noted: 2019-09-10

## 2022-05-02 PROBLEM — M65.30 TRIGGERING OF DIGIT: Status: ACTIVE | Noted: 2020-08-04

## 2022-05-02 PROBLEM — M19.071 PRIMARY OSTEOARTHRITIS OF BOTH FEET: Status: ACTIVE | Noted: 2019-09-10

## 2022-05-02 PROBLEM — M19.072 PRIMARY OSTEOARTHRITIS OF BOTH FEET: Status: ACTIVE | Noted: 2019-09-10

## 2022-05-02 PROBLEM — M47.812 NECK ARTHRITIS: Status: ACTIVE | Noted: 2018-11-28

## 2022-05-02 LAB — SARS-COV-2 RNA RESP QL NAA+PROBE: NEGATIVE

## 2022-05-02 PROCEDURE — U0005 INFEC AGEN DETEC AMPLI PROBE: HCPCS

## 2022-05-02 PROCEDURE — 99214 OFFICE O/P EST MOD 30 MIN: CPT | Mod: 95 | Performed by: INTERNAL MEDICINE

## 2022-05-02 PROCEDURE — U0003 INFECTIOUS AGENT DETECTION BY NUCLEIC ACID (DNA OR RNA); SEVERE ACUTE RESPIRATORY SYNDROME CORONAVIRUS 2 (SARS-COV-2) (CORONAVIRUS DISEASE [COVID-19]), AMPLIFIED PROBE TECHNIQUE, MAKING USE OF HIGH THROUGHPUT TECHNOLOGIES AS DESCRIBED BY CMS-2020-01-R: HCPCS

## 2022-05-02 RX ORDER — FLUTICASONE PROPIONATE 50 MCG
1 SPRAY, SUSPENSION (ML) NASAL DAILY
Qty: 16 ML | Refills: 11 | COMMUNITY
Start: 2022-05-02 | End: 2022-09-01

## 2022-05-02 NOTE — PROGRESS NOTES
Humberto is a 66 year old male contacting the clinic today via video, who will use the platform: [email or web address] for the visit.  Phone # for D[employer], or if Amwell drops:   Telephone Information:   Mobile [phone]          ASSESSMENT and PLAN:  1. Close exposure to COVID-19 virus  Recommend checking prior to treatment  - Symptomatic; Yes; 4/29/2022 COVID-19 Virus (Coronavirus) by PCR; Future    2. Viral respiratory infection  Add nasal spray.  Check for active infection.  Minimal benefits with antivirals given overall good health  - Symptomatic; Yes; 4/29/2022 COVID-19 Virus (Coronavirus) by PCR; Future  - fluticasone (FLONASE) 50 MCG/ACT nasal spray; Spray 1 spray into both nostrils daily  Dispense: 16 mL; Refill: 11    3. Adenomatous polyp  Reviewed and found last colonoscopy in care everywhere 2017.  Follow-up colonoscopy scheduled already for July 4. Diverticulosis of large intestine without hemorrhage  Noted and asymptomatic.  Improved with weight loss      Preventive Care Assessed: Otherwise up-to-date     Patient Instructions   COVID swab    Recommend viral quarantine 5 days then masking the next 5 days.  Friday, April 29 day 0.  Today day 3    Discussed possible Paxlovid    Ibuprofen, Claritin, and Flonase nasal spray over-the-counter recommended    Consider prednisone     Medication list carefully reviewed and corrected  Epic Merger Problem list addressed and updated     Return in about 6 months (around 11/2/2022).    CHIEF COMPLAINT:  Chief Complaint   Patient presents with     Nasal Congestion     Exposure to covid x 10 days ago. Current sxs's: stuffy nose & trouble swallowing       HISTORY OF PRESENT ILLNESS:  Humberto is a 66 year old male contacting the clinic today via video for complaints of viral upper respiratory symptoms.  He received the fourth COVID shot on April 21 and was also exposed to a student sometime last week with positive COVID.  He developed symptoms on April 29 of sore throat and nasal  "congestion with slight cough.  He is otherwise healthy.  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  He has received the other 3 COVID shots.  He feels some myalgias but no shortness of breath.  Cough is controlled with over-the-counter medication    REVIEW OF SYSTEMS:   No acid reflux    PFSH:  Social History     Social History Narrative    Dairy/d 2-3 servings/d.     Caffeine 0-3 servings/d    Exercise 3-4 x week    Sunscreen used - Yes    Seatbelts used - Yes    Working smoke/CO detectors in the home - Yes    Guns stored in the home - No    Self Breast Exams - NOT APPLICABLE    Self Testicular Exam - Yes    Eye Exam up to date - Yes    Dental Exam up to date - Yes    Pap Smear up to date - NOT APPLICABLE    Mammogram up to date - NOT APPLICABLE    PSA up to date - NOT APPLICABLE    Dexa Scan up to date - NOT APPLICABLE    Flex Sig / Colonoscopy up to date - Yes    Immunizations up to date - Yes    Abuse: Current or Past(Physical, Sexual or Emotional)- No    Do you feel safe in your environment - Yes        07/2010                   TOBACCO USE:  History   Smoking Status     Never Smoker   Smokeless Tobacco     Never Used       VITALS:  There were no vitals filed for this visit.  There were no vitals taken for this visit. Estimated body mass index is 31.03 kg/m  as calculated from the following:    Height as of 2/16/22: 1.842 m (6' 0.5\").    Weight as of 2/16/22: 105.2 kg (232 lb).    PHYSICAL EXAM:  (observations via Video)  Alert and oriented with occasional cough.  No wheezing or shortness of breath    MEDICATIONS:   Current Outpatient Medications   Medication Sig Dispense Refill     Fiber TABS 2 tablets as needed        fish oil-omega-3 fatty acids 1000 MG capsule Take 1 g by mouth daily.  90 capsule 3     fluticasone (FLONASE) 50 MCG/ACT nasal spray Spray 1 spray into both nostrils daily 16 mL 11     Multiple Vitamin (MULTI VITAMIN MENS PO) Take 1 tablet by mouth daily       ORDER FOR DME Use " CPAP as directed by your provider         Outside Notes summarized:   Labs, x-rays, cardiology, GI tests reviewed:   Recent Labs   Lab Test 02/16/22  1500      POTASSIUM 4.1   CR 1.00     No results found for: [id number]  Lab Results   Component Value Date    VITDT 28 10/16/2015     Lab Results   Component Value Date    CHOL 142 02/16/2022    CHOL 163 04/22/2016     New orders:   Orders Placed This Encounter   Procedures     Symptomatic; Yes; 4/29/2022 COVID-19 Virus (Coronavirus) by PCR       Independent review of:    Supplemental history by:      Patient has given verbal consent to a Video visit?  Yes  How would you like to obtain your AVS?  MyChart  Will anyone else be joining your video visit? No       Video-Visit Details  Type of service:  Video Visit  Originating Location (pt. Location): Home  Distant Location (provider location):   [facility]    History of Present Illness       Reason for visit:  Slightly stuffy nose....and was informed that I was exposed to covid  Symptom onset:  1-3 days ago  Symptoms include:  Stuffy nose  Symptom intensity:  Mild  Symptom progression:  Staying the same  Had these symptoms before:  Yes  Has tried/received treatment for these symptoms:  Yes  Previous treatment was successful:  Yes  Prior treatment description:  Decongestant  - (for possible allergy)  What makes it worse:  Not sure  What makes it better:  Getting outdoors    He eats 4 or more servings of fruits and vegetables daily.He consumes 0 sweetened beverage(s) daily.He exercises with enough effort to increase his heart rate 20 to 29 minutes per day.  He exercises with enough effort to increase his heart rate 5 days per week.   He is taking medications regularly.          Jamie Garner MD    [facility]    Video Start Time: 11:34 AM  Video End time:  12:03 PM  Face to face plus orders: 29 minutes  Documentation time:  3 minutes     The visit lasted  a total of 32 minutes

## 2022-05-02 NOTE — PATIENT INSTRUCTIONS
COVID swab    Recommend viral quarantine 5 days then masking the next 5 days.  Friday, April 29 day 0.  Today day 3    Discussed possible Paxlovid    Ibuprofen, Claritin, and Flonase nasal spray over-the-counter recommended    Consider prednisone

## 2022-07-05 ENCOUNTER — TRANSFERRED RECORDS (OUTPATIENT)
Dept: HEALTH INFORMATION MANAGEMENT | Facility: CLINIC | Age: 67
End: 2022-07-05

## 2022-08-19 ENCOUNTER — TELEPHONE (OUTPATIENT)
Dept: SLEEP MEDICINE | Facility: CLINIC | Age: 67
End: 2022-08-19

## 2022-08-19 DIAGNOSIS — G47.33 OSA (OBSTRUCTIVE SLEEP APNEA): Primary | ICD-10-CM

## 2022-08-19 NOTE — TELEPHONE ENCOUNTER
Called patient to set up mask consult/troubleshoot appointment due the fact that his old mask is being discontinued and his machine had the motor error message. Will also complete a manual download for most recent usage data for insurance and upcoming appt. Also informed patient that we received replacement Rx for new CPAP device and that the waitlist is 1.5 - 2 years long. Pt understood.

## 2022-08-29 ENCOUNTER — MYC MEDICAL ADVICE (OUTPATIENT)
Dept: INTERNAL MEDICINE | Facility: CLINIC | Age: 67
End: 2022-08-29

## 2022-08-29 DIAGNOSIS — L98.9 SKIN LESION: Primary | ICD-10-CM

## 2022-09-08 ENCOUNTER — OFFICE VISIT (OUTPATIENT)
Dept: INTERNAL MEDICINE | Facility: CLINIC | Age: 67
End: 2022-09-08
Payer: COMMERCIAL

## 2022-09-08 VITALS
HEART RATE: 60 BPM | WEIGHT: 225 LBS | SYSTOLIC BLOOD PRESSURE: 118 MMHG | DIASTOLIC BLOOD PRESSURE: 64 MMHG | BODY MASS INDEX: 29.82 KG/M2 | OXYGEN SATURATION: 98 % | HEIGHT: 73 IN | TEMPERATURE: 98 F

## 2022-09-08 DIAGNOSIS — L98.9 SKIN LESION: Primary | ICD-10-CM

## 2022-09-08 DIAGNOSIS — L82.1 SEBORRHEIC KERATOSIS: ICD-10-CM

## 2022-09-08 PROCEDURE — G0008 ADMIN INFLUENZA VIRUS VAC: HCPCS | Performed by: INTERNAL MEDICINE

## 2022-09-08 PROCEDURE — 99213 OFFICE O/P EST LOW 20 MIN: CPT | Mod: 25 | Performed by: INTERNAL MEDICINE

## 2022-09-08 PROCEDURE — 90662 IIV NO PRSV INCREASED AG IM: CPT | Performed by: INTERNAL MEDICINE

## 2022-09-08 NOTE — PROGRESS NOTES
"  Assessment & Plan     1. Skin lesion  I reassured patient about his multiple seborrheic keratoses.  I think he would benefit from a skin check though.  I want the chest lesion and the temple lesion evaluated.  I did discuss with him these are not emergent issues however.  We will have him meet with Dr. Sushant Smith.  - Adult Dermatology Referral; Future    2. Seborrheic keratosis  Reassurance given to patient.  We discussed natural history of these  - Adult Dermatology Referral; Future             BMI:   Estimated body mass index is 30.1 kg/m  as calculated from the following:    Height as of this encounter: 1.842 m (6' 0.5\").    Weight as of this encounter: 102.1 kg (225 lb).           Return in about 5 months (around 2/8/2023) for Follow up, with PCP only, Next scheduled visit.    SANGEETA SAGASTUME MD  [facility]    Darryl Gonzales is a 67 year old, presenting for the following health issues:  Mole (Has a few moles growth in back (lower right side) - possible referral to Derm )      History of Present Illness       Reason for visit:  Red spot on mole on lower right of back  Symptom onset:  1-2 weeks ago  Symptoms include:  Red spot on mole - mole looks different  Symptom intensity:  Mild  Symptom progression:  Staying the same  Had these symptoms before:  No  What makes it worse:  No  What makes it better:  No...symptom should be checked.    He eats 2-3 servings of fruits and vegetables daily.He consumes 1 sweetened beverage(s) daily.He exercises with enough effort to increase his heart rate 20 to 29 minutes per day.  He exercises with enough effort to increase his heart rate 5 days per week.   He is taking medications regularly.         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  He was concerned about lesions on his back.  They have been getting larger.  They do not terribly hurt him but sometimes bother him.  He also has sleep apnea and tells me " "he cannot get a new machine.    Review of Systems         Objective    /64 (BP Location: Left arm, Patient Position: Sitting, Cuff Size: Adult Regular)   Pulse 60   Temp 98  F (36.7  C)   Ht 1.842 m (6' 0.5\")   Wt 102.1 kg (225 lb)   SpO2 98%   BMI 30.10 kg/m    Body mass index is 30.1 kg/m .  Physical Exam   Pleasant gentleman.  He has multiple seborrheic keratoses.  He has 1 hyperpigmented dark lesion on his right upper chest as well as a hyperkeratotic rough lesion left temple                    "

## 2022-09-16 ENCOUNTER — IMMUNIZATION (OUTPATIENT)
Dept: FAMILY MEDICINE | Facility: CLINIC | Age: 67
End: 2022-09-16
Payer: COMMERCIAL

## 2022-09-16 PROCEDURE — 0134A COVID-19,PF,MODERNA BIVALENT: CPT

## 2022-09-16 PROCEDURE — 91313 COVID-19,PF,MODERNA BIVALENT: CPT

## 2022-09-19 ASSESSMENT — SLEEP AND FATIGUE QUESTIONNAIRES
HOW LIKELY ARE YOU TO NOD OFF OR FALL ASLEEP WHILE SITTING INACTIVE IN A PUBLIC PLACE: WOULD NEVER DOZE
HOW LIKELY ARE YOU TO NOD OFF OR FALL ASLEEP WHILE SITTING AND TALKING TO SOMEONE: WOULD NEVER DOZE
HOW LIKELY ARE YOU TO NOD OFF OR FALL ASLEEP IN A CAR, WHILE STOPPED FOR A FEW MINUTES IN TRAFFIC: WOULD NEVER DOZE
HOW LIKELY ARE YOU TO NOD OFF OR FALL ASLEEP WHILE SITTING QUIETLY AFTER LUNCH WITHOUT ALCOHOL: WOULD NEVER DOZE
HOW LIKELY ARE YOU TO NOD OFF OR FALL ASLEEP WHEN YOU ARE A PASSENGER IN A CAR FOR AN HOUR WITHOUT A BREAK: SLIGHT CHANCE OF DOZING
HOW LIKELY ARE YOU TO NOD OFF OR FALL ASLEEP WHILE SITTING AND READING: SLIGHT CHANCE OF DOZING
HOW LIKELY ARE YOU TO NOD OFF OR FALL ASLEEP WHILE LYING DOWN TO REST IN THE AFTERNOON WHEN CIRCUMSTANCES PERMIT: SLIGHT CHANCE OF DOZING
HOW LIKELY ARE YOU TO NOD OFF OR FALL ASLEEP WHILE WATCHING TV: SLIGHT CHANCE OF DOZING

## 2022-09-22 ENCOUNTER — OFFICE VISIT (OUTPATIENT)
Dept: SLEEP MEDICINE | Facility: CLINIC | Age: 67
End: 2022-09-22
Payer: COMMERCIAL

## 2022-09-22 VITALS
HEIGHT: 73 IN | SYSTOLIC BLOOD PRESSURE: 137 MMHG | HEART RATE: 57 BPM | DIASTOLIC BLOOD PRESSURE: 88 MMHG | BODY MASS INDEX: 29.79 KG/M2 | WEIGHT: 224.8 LBS | OXYGEN SATURATION: 99 %

## 2022-09-22 DIAGNOSIS — G47.33 OSA (OBSTRUCTIVE SLEEP APNEA): Primary | ICD-10-CM

## 2022-09-22 PROCEDURE — 99213 OFFICE O/P EST LOW 20 MIN: CPT | Performed by: INTERNAL MEDICINE

## 2022-09-22 NOTE — NURSING NOTE
"Chief Complaint   Patient presents with     CPAP Follow Up       Initial /88   Pulse 57   Ht 1.842 m (6' 0.5\")   Wt 102 kg (224 lb 12.8 oz)   SpO2 99%   BMI 30.07 kg/m   Estimated body mass index is 30.07 kg/m  as calculated from the following:    Height as of this encounter: 1.842 m (6' 0.5\").    Weight as of this encounter: 102 kg (224 lb 12.8 oz).    Medication Reconciliation: complete    ESS:4    David Win CNA      "

## 2022-09-22 NOTE — PATIENT INSTRUCTIONS
Your There is no height or weight on file to calculate BMI.  Weight management is a personal decision.  If you are interested in exploring weight loss strategies, the following discussion covers the approaches that may be successful. Body mass index (BMI) is one way to tell whether you are at a healthy weight, overweight, or obese. It measures your weight in relation to your height.  A BMI of 18.5 to 24.9 is in the healthy range. A person with a BMI of 25 to 29.9 is considered overweight, and someone with a BMI of 30 or greater is considered obese. More than two-thirds of American adults are considered overweight or obese.  Being overweight or obese increases the risk for further weight gain. Excess weight may lead to heart disease and diabetes.  Creating and following plans for healthy eating and physical activity may help you improve your health.  Weight control is part of healthy lifestyle and includes exercise, emotional health, and healthy eating habits. Careful eating habits lifelong are the mainstay of weight control. Though there are significant health benefits from weight loss, long-term weight loss with diet alone may be very difficult to achieve- studies show long-term success with dietary management in less than 10% of people. Attaining a healthy weight may be especially difficult to achieve in those with severe obesity. In some cases, medications, devices and surgical management might be considered.  What can you do?  If you are overweight or obese and are interested in methods for weight loss, you should discuss this with your provider.     Consider reducing daily calorie intake by 500 calories.     Keep a food journal.     Avoiding skipping meals, consider cutting portions instead.    Diet combined with exercise helps maintain muscle while optimizing fat loss. Strength training is particularly important for building and maintaining muscle mass. Exercise helps reduce stress, increase energy, and  improves fitness. Increasing exercise without diet control, however, may not burn enough calories to loose weight.       Start walking three days a week 10-20 minutes at a time    Work towards walking thirty minutes five days a week     Eventually, increase the speed of your walking for 1-2 minutes at time    In addition, we recommend that you review healthy lifestyles and methods for weight loss available through the National Institutes of Health patient information sites:  http://win.niddk.nih.gov/publications/index.htm    And look into health and wellness programs that may be available through your health insurance provider, employer, local community center, or darryl club.

## 2022-09-22 NOTE — PROGRESS NOTES
Obstructive Sleep Apnea - PAP Follow-Up Visit:    Chief Complaint   Patient presents with     CPAP Follow Up       Humberto Pino comes in today for follow-up of their mild sleep apnea, managed with CPAP.     ADINA was diagnosed at [facility] sleep clinic in 2008 and showed a baseline AHI of 5.1 per hour. He has been on PAP therapy since then,.     Do you use a CPAP Machine at home: Yes  Overall, on a scale of 0-10 how would you rate your CPAP (0 poor, 10 great): 7  Is your mask comfortable: Yes  If not, why:    Is you mask leaking: No  If yes, where do you feel it:    How many night per week does the mask leak (0-7):    Do you notice snoring with mask on: No  Do you notice gasping arousals with mask on: No  Are you having significant oral or nasal dryness: No  Is the pressure setting comfortable: Yes  In not, why:    What type of mask do you use: Full Face Mask  What is your typical bedtime: 830 pm  How long does it take you to go to sleep on PAP therapy: 10 minutes  What time do you typically get out of bed for the day: Awake at 3 am back to sleep 4 am...Up at 630 am  How many hours on average per night are you using PAP therapy: 9  How many hours are you sleeping per night: 8  Do you feel well rested in the morning: Yes     ResMed     Auto-PAP 8-15 cmH2O download:  29/30 total days of use. 1 nonuse days. 97% days with >4 hours use.  Average use 9 hours 34 minutes per day. Median Leak 0 L/min. 95%ile Leak 1 L/min. CPAP 95% pressure 14.8cm. AHI 0.8    EPWORTH SLEEPINESS SCALE      Norman Sleepiness Scale ( ZHOU Starr  [id number]<br>ESS - [place]/English - Final version - 21 Nov 07 - [facility] [place].) 9/19/2022   Sitting and reading Slight chance of dozing   Watching TV Slight chance of dozing   Sitting, inactive in a public place (e.g. a theatre or a meeting) Would never doze   As a passenger in a car for an hour without a break Slight chance of dozing   Lying down to rest in the afternoon when  circumstances permit Slight chance of dozing   Sitting and talking to someone Would never doze   Sitting quietly after a lunch without alcohol Would never doze   In a car, while stopped for a few minutes in traffic Would never doze   [place]walk Score (MC) 4   [place]walk Score (Sleep) 4       INSOMNIA SEVERITY INDEX (KENDALL)      Insomnia Severity Index (KENDALL) 9/19/2022   Difficulty falling asleep 0   Difficulty staying asleep 1   Problems waking up too early 2   How SATISFIED/DISSATISFIED are you with your CURRENT sleep pattern? 2   How NOTICEABLE to others do you think your sleep problem is in terms of impairing the quality of your life? 0   How WORRIED/DISTRESSED are you about your current sleep problem? 1   To what extent do you consider your sleep problem to INTERFERE with your daily functioning (e.g. daytime fatigue, mood, ability to function at work/daily chores, concentration, memory, mood, etc.) CURRENTLY? 0   KENDALL Total Score 6       Guidelines for Scoring/Interpretation:  Total score categories:  0-7 = No clinically significant insomnia   8-14 = Subthreshold insomnia   15-21 = Clinical insomnia (moderate severity)  22-28 = Clinical insomnia (severe)  Used via courtesy of www.[employer] Telecome[employer].va.gov with permission from Shmuel Dela Cruz PhD., [facility]      Problem List:  Patient Active Problem List    Diagnosis Date Noted     Adenomatous polyp 05/02/2022     Priority: Medium     Diverticulosis of large intestine 05/02/2022     Priority: Medium     Triggering of digit 08/04/2020     Priority: Medium     Primary osteoarthritis of both feet 09/10/2019     Priority: Medium     Obesity, Class I, BMI 30-34.9 09/10/2019     Priority: Medium     Neck arthritis 11/28/2018     Priority: Medium     Arthritis 01/01/2016     Priority: Medium     BMI 30-35 07/31/2014     Priority: Medium     Hallux varus 08/14/2013     Priority: Medium     ADINA (obstructive sleep apnea) 08/12/2008     Priority: Medium     Obstructive sleep apnea  "syndrome 08/12/2008     Priority: Medium          /88   Pulse 57   Ht 1.842 m (6' 0.5\")   Wt 102 kg (224 lb 12.8 oz)   SpO2 99%   BMI 30.07 kg/m      Impression/Plan:     Mild sleep apnea.     - Patient is regularly using CPAP therapy and benefiting from treatment.  Review of download data from his device shows regular compliance and normal residual AHI, confirming adequate treatment of sleep apnea.    - We reviewed optimal sleep hygiene practices and sleep schedules during the visit.    Plan:     -Continue auto CPAP therapy    Humberto Pino will follow up in about 1 year(s).     I spent a total of 20 minutes for this appointment on this date of service which include time spent before, during and after the visit for chart review, patient care, counseling and coordination of care..      Costa Mayer MD    CC:  Rajiv Ludwig,   "

## 2022-10-11 ENCOUNTER — MYC MEDICAL ADVICE (OUTPATIENT)
Dept: INTERNAL MEDICINE | Facility: CLINIC | Age: 67
End: 2022-10-11

## 2022-10-11 DIAGNOSIS — H93.93 EAR PROBLEM, BILATERAL: Primary | ICD-10-CM

## 2022-10-19 ENCOUNTER — TRANSFERRED RECORDS (OUTPATIENT)
Dept: HEALTH INFORMATION MANAGEMENT | Facility: CLINIC | Age: 67
End: 2022-10-19

## 2022-10-20 NOTE — TELEPHONE ENCOUNTER
FUTURE VISIT INFORMATION      FUTURE VISIT INFORMATION:    Date: 11/28/2022    Time: 11:20 AM     Location: [facility]   REFERRAL INFORMATION:    Referring provider:  Dr. Rajiv Ludwig     Referring providers clinic:  [facility]     Reason for visit/diagnosis  Ear problem- bilateral, Ear wax removal and hearing check      RECORDS REQUESTED FROM:       Clinic name Comments Records Status Imaging Status   [facility]  10/11/2022 MyC Medical Advice  [facility]     [facility]  11/26/19 Office visit with Kojo Tabares      Audiometric Evaluation: 11/26/19 - Received    Care Everywhere    [facility]  11/26/19 Office visit with Loi White PA-C Care Everywhere    [facility]  9/10/19 Office visit with Dr. Jeremias Baker  Care Everywhere    [facility]  7/31/14 Office visit with KEEGAN Bergeron             10/20/2022 8:57am Fax request sent to [facility] ([phone]) for Audiogram 11/26/19. -[place]   11/7/22 11am - sent a 2nd request to [facility] to send audiogram to us  - Lucila Grijalva 11/9/22 - received audiogram from [facility] and sent it to [facility]iva- Lucila

## 2022-11-23 DIAGNOSIS — Z01.10 ENCOUNTER FOR HEARING TEST: Primary | ICD-10-CM

## 2022-11-28 ENCOUNTER — OFFICE VISIT (OUTPATIENT)
Dept: OTOLARYNGOLOGY | Facility: CLINIC | Age: 67
End: 2022-11-28
Attending: INTERNAL MEDICINE
Payer: COMMERCIAL

## 2022-11-28 ENCOUNTER — ANCILLARY PROCEDURE (OUTPATIENT)
Dept: CT IMAGING | Facility: CLINIC | Age: 67
End: 2022-11-28
Attending: REGISTERED NURSE
Payer: COMMERCIAL

## 2022-11-28 ENCOUNTER — OFFICE VISIT (OUTPATIENT)
Dept: AUDIOLOGY | Facility: CLINIC | Age: 67
End: 2022-11-28
Payer: COMMERCIAL

## 2022-11-28 ENCOUNTER — PRE VISIT (OUTPATIENT)
Dept: OTOLARYNGOLOGY | Facility: CLINIC | Age: 67
End: 2022-11-28

## 2022-11-28 VITALS
HEART RATE: 73 BPM | WEIGHT: 132 LBS | HEIGHT: 72 IN | DIASTOLIC BLOOD PRESSURE: 90 MMHG | TEMPERATURE: 98.1 F | OXYGEN SATURATION: 98 % | SYSTOLIC BLOOD PRESSURE: 159 MMHG | BODY MASS INDEX: 17.88 KG/M2

## 2022-11-28 DIAGNOSIS — R09.81 NASAL CONGESTION: ICD-10-CM

## 2022-11-28 DIAGNOSIS — Z01.10 NORMAL HEARING EXAM: Primary | ICD-10-CM

## 2022-11-28 DIAGNOSIS — H93.93 EAR PROBLEM, BILATERAL: ICD-10-CM

## 2022-11-28 DIAGNOSIS — R09.81 NASAL CONGESTION: Primary | ICD-10-CM

## 2022-11-28 PROCEDURE — 31575 DIAGNOSTIC LARYNGOSCOPY: CPT | Performed by: REGISTERED NURSE

## 2022-11-28 PROCEDURE — 99204 OFFICE O/P NEW MOD 45 MIN: CPT | Mod: 25 | Performed by: REGISTERED NURSE

## 2022-11-28 PROCEDURE — 92557 COMPREHENSIVE HEARING TEST: CPT | Performed by: AUDIOLOGIST

## 2022-11-28 PROCEDURE — 92550 TYMPANOMETRY & REFLEX THRESH: CPT | Performed by: AUDIOLOGIST

## 2022-11-28 PROCEDURE — 70486 CT MAXILLOFACIAL W/O DYE: CPT | Mod: GC | Performed by: RADIOLOGY

## 2022-11-28 ASSESSMENT — PAIN SCALES - GENERAL: PAINLEVEL: NO PAIN (0)

## 2022-11-28 NOTE — PROGRESS NOTES
AUDIOLOGY REPORT    SUMMARY: Audiology visit completed. See audiogram for results.      RECOMMENDATIONS: Follow-up with ENT.      Shahzad Hudson.  Licensed Audiologist  [place] #[id number]

## 2022-11-28 NOTE — LETTER
11/28/2022       RE: Humberto Pino  [address]  [place] [address]     Dear Colleague,    Thank you for referring your patient, Humberto Pino, to the [facility] [place] [facility]. Please see a copy of my visit note below.      Otolaryngology Clinic  November 28, 2022    Chief Complaint:   Ear fullness and nasal congestion       History of Present Illness:   Humberto iPno is a 67 year old male who presents today for bilateral ear plugging and tinnitus. Patient reports right ear pain and bilateral tinnitus presenting in September. Has slowly improved. More bothersome is nasal congestion. Right worse than left. Has used flonase without improvement. Reports clear nasal drainage without any purulence or bleeding. Started using a new CPAP mask in August and wonders if that could be causing congestion. History of allergies including hay fever and dust.    Patient denies any otorrhea, dizziness, changes in hearing facial numbness/weakness, history of frequent ear infections, or ear surgeries. Denies any blurred vision or headaches.     Past Medical History:  Past Medical History:   Diagnosis Date     Arthritis 01/01/2016     Diverticulosis of colon (without mention of hemorrhage)     repeat 2012     ADINA (obstructive sleep apnea)      Tinnitus        Past Surgical History:  Past Surgical History:   Procedure Laterality Date     ARTHROSCOPY KNEE  8/4/2011    Procedure:ARTHROSCOPY KNEE; With debridement of medial and lateral meniscus  ; Surgeon:AMARILIS PETERS; [facility]     HERNIA REPAIR, UMBILICAL       TOE SURGERY      feb 2006     [facility] REPAIR CRUCIATE LIGAMENT,KNEE      both knees-ACL       Medications:  Current Outpatient Medications   Medication Sig Dispense Refill     Fiber TABS 2 tablets as needed        fish oil-omega-3 fatty acids 1000 MG capsule Take 1 g by mouth daily.  90 capsule 3     Multiple Vitamin  (MULTI VITAMIN MENS PO) Take 1 tablet by mouth daily       ORDER FOR DME Use CPAP as directed by your provider         Allergies:  Allergies   Allergen Reactions     Hay Fever & [A.R.M.] Other (See Comments)     Sneezing & stuffy nose        Social History:  Social History     Tobacco Use     Smoking status: Never     Smokeless tobacco: Never   Substance Use Topics     Alcohol use: Yes     Comment: once/week     Drug use: No       ROS: 10 point ROS neg other than the symptoms noted above in the HPI.    Physical Exam:    BP (!) 159/90   Pulse 73   Temp 98.1  F (36.7  C)   Ht 1.829 m (6')   Wt 59.9 kg (132 lb)   SpO2 98%   BMI 17.90 kg/m       Constitutional:  The patient was unaccompanied, well-groomed, and in no acute distress.     Neurologic: Alert and oriented x 3.  CN's III-XII within normal limits.  Voice normal.   Psychiatric: The patient's affect was calm, cooperative, and appropriate.    Communication:  Normal; communicates verbally, normal voice quality.   Respiratory: Breathing comfortably without stridor or exertion of accessory muscles.    Ears: Pinnae and tragus non-tender.  EAC's and TM's were clear.    Neck: Supple with normal laryngeal and tracheal landmarks. No palpable thyroid.  Normal range of motion.   Lymphatic: There is no palpable lymphadenopathy in the neck.       Flexible fiberoptic laryngoscopy: Scope exam was indicated due to nasal congestion. Verbal consent was obtained. The nasal cavity was prepped with an aerosolized solution of topical anesthetic and vasoconstrictive agent. Unable to pass scope through right nasal cavity due to obstructing tissue mass without bleeding or ulceration. The scope was passed through the left anterior nasal cavity and advanced. Inspection of the nasopharynx with polypoid mass at anterior portion of nasopharynx without bleeding or ulceration. The remainder of the nasopharynx unremarkable.     Audiogram: 11/28/2022- data independently reviewed  Normal  hearing bilaterally   % at 45 dB bilaterally  Acoustic Reflexes: present all conditions  Tympanograms: type A bilaterally          Assessment and Plan:  1. Ear problem, bilateral  Patient with right ear pain and bilateral tinnitus. Ear exam is unremarkable today. Reviewed audiogram which demonstrates normal hearing bilaterally. Patient admits that these symptoms are not bothersome and is more bothered by nasal congestion. Recommend to continue to monitor.    2. Nasal congestion  Patient with bothersome nasal congestion. Scope exam shows obstructing tissue in the right anterior nasal cavity and anterior nasopharynx. Will have patient obtain a CT sinus for further evaluation and will refer to  for management based on exam.       Anya Rosas DNP, APRN, CNP  Otolaryngology  Head & Neck Surgery  [phone]    45 minutes spent on the date of the encounter doing chart review, history and exam, documentation and further activities per the note excluding time performing scope exam.

## 2022-11-28 NOTE — PATIENT INSTRUCTIONS
- You were seen in the ENT Clinic today by Anya Rosas NP.   - The plan is to schedule CT sinus. Will contact patient with results and plan of care once results are available.  - Please send a [employer] message or call the ENT clinic at [phone] with any questions or concerns.

## 2022-11-28 NOTE — NURSING NOTE
Chief Complaint   Patient presents with     Consult     Bilateral ear pain     Blood pressure (!) 159/90, pulse 73, temperature 98.1  F (36.7  C), height 1.829 m (6'), weight 59.9 kg (132 lb), SpO2 98 %.    Carroll Conroy LPN

## 2022-11-28 NOTE — PROGRESS NOTES
Otolaryngology Clinic  November 28, 2022    Chief Complaint:   Ear fullness and nasal congestion       History of Present Illness:   Humberto Pino is a 67 year old male who presents today for bilateral ear plugging and tinnitus. Patient reports right ear pain and bilateral tinnitus presenting in September. Has slowly improved. More bothersome is nasal congestion. Right worse than left. Has used flonase without improvement. Reports clear nasal drainage without any purulence or bleeding. Started using a new CPAP mask in August and wonders if that could be causing congestion. History of allergies including hay fever and dust.    Patient denies any otorrhea, dizziness, changes in hearing facial numbness/weakness, history of frequent ear infections, or ear surgeries. Denies any blurred vision or headaches.     Past Medical History:  Past Medical History:   Diagnosis Date     Arthritis 01/01/2016     Diverticulosis of colon (without mention of hemorrhage)     repeat 2012     ADINA (obstructive sleep apnea)      Tinnitus        Past Surgical History:  Past Surgical History:   Procedure Laterality Date     ARTHROSCOPY KNEE  8/4/2011    Procedure:ARTHROSCOPY KNEE; With debridement of medial and lateral meniscus  ; Surgeon:AMARILIS PETERS; [facility]     HERNIA REPAIR, UMBILICAL       TOE SURGERY      feb 2006     [facility] REPAIR CRUCIATE LIGAMENT,KNEE      both knees-ACL       Medications:  Current Outpatient Medications   Medication Sig Dispense Refill     Fiber TABS 2 tablets as needed        fish oil-omega-3 fatty acids 1000 MG capsule Take 1 g by mouth daily.  90 capsule 3     Multiple Vitamin (MULTI VITAMIN MENS PO) Take 1 tablet by mouth daily       ORDER FOR DME Use CPAP as directed by your provider         Allergies:  Allergies   Allergen Reactions     Hay Fever & [A.R.M.] Other (See Comments)     Sneezing & stuffy nose        Social History:  Social History     Tobacco Use     Smoking status: Never     Smokeless  tobacco: Never   Substance Use Topics     Alcohol use: Yes     Comment: once/week     Drug use: No       ROS: 10 point ROS neg other than the symptoms noted above in the HPI.    Physical Exam:    BP (!) 159/90   Pulse 73   Temp 98.1  F (36.7  C)   Ht 1.829 m (6')   Wt 59.9 kg (132 lb)   SpO2 98%   BMI 17.90 kg/m       Constitutional:  The patient was unaccompanied, well-groomed, and in no acute distress.     Neurologic: Alert and oriented x 3.  CN's III-XII within normal limits.  Voice normal.   Psychiatric: The patient's affect was calm, cooperative, and appropriate.    Communication:  Normal; communicates verbally, normal voice quality.   Respiratory: Breathing comfortably without stridor or exertion of accessory muscles.    Ears: Pinnae and tragus non-tender.  EAC's and TM's were clear.    Neck: Supple with normal laryngeal and tracheal landmarks. No palpable thyroid.  Normal range of motion.   Lymphatic: There is no palpable lymphadenopathy in the neck.       Flexible fiberoptic laryngoscopy: Scope exam was indicated due to nasal congestion. Verbal consent was obtained. The nasal cavity was prepped with an aerosolized solution of topical anesthetic and vasoconstrictive agent. Unable to pass scope through right nasal cavity due to obstructing tissue mass without bleeding or ulceration. The scope was passed through the left anterior nasal cavity and advanced. Inspection of the nasopharynx with polypoid mass at anterior portion of nasopharynx without bleeding or ulceration. The remainder of the nasopharynx unremarkable.     Audiogram: 11/28/2022- data independently reviewed  Normal hearing bilaterally   % at 45 dB bilaterally  Acoustic Reflexes: present all conditions  Tympanograms: type A bilaterally          Assessment and Plan:  1. Ear problem, bilateral  Patient with right ear pain and bilateral tinnitus. Ear exam is unremarkable today. Reviewed audiogram which demonstrates normal hearing  bilaterally. Patient admits that these symptoms are not bothersome and is more bothered by nasal congestion. Recommend to continue to monitor.    2. Nasal congestion  Patient with bothersome nasal congestion. Scope exam shows obstructing tissue in the right anterior nasal cavity and anterior nasopharynx. Will have patient obtain a CT sinus for further evaluation and will refer to  for management based on exam.       Anya Rosas DNP, APRN, CNP  Otolaryngology  Head & Neck Surgery  [phone]    45 minutes spent on the date of the encounter doing chart review, history and exam, documentation and further activities per the note excluding time performing scope exam.

## 2022-11-30 NOTE — TELEPHONE ENCOUNTER
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               
Spontaneous, unlabored and symmetrical

## 2022-12-06 NOTE — PROGRESS NOTES
[facility]  New Patient Visit      Encounter date:   December 7, 2022    Referring Provider:   No referring provider defined for this encounter.    Chief Complaint: inverted papilloma    History of Present Illness:   Humberto Pino is a 67 year old male who presents for consultation regarding inverted papilloma on CT. Patient was seen by Anya Rosas on 11/28/22 for nasal congestion right worse than left. He had tried Flonase without improvement. She ordered CT which showed growth in the right nasal cavity that is suspicious for an inverted squamous papilloma.    Today, he reports that he is not experiencing ear pain any longer. In the morning, his ears pop with his first glass of water. If he doesn't drink lots of water, he has trouble swallowing food, so he uses lots of water. Occasionally, he has felt slight light headedness but not dizziness. His sense of taste and smell have been poor. He uses a CPAP. His symptoms started around gradually around early September. He has difficulty breathing through the nose which is worse on the right. Advil helps with the pain and pressure. He has no prior history of sinus or nasal surgery in the past.     Sino-Nasal Outcome Test (SNOT - 22)  1. Need to Blow Nose: (P) Very mild  2. Nasal Blockage: (P) Moderate  3. Sneezing: (P) Mild or slight  4. Runny Nose: (P) Moderate  5. Cough: (P) None  6. Post-nasal discharge: (P) Mild or slight  7. Thick nasal discharge: (P) None  8. Ear fullness: (P) Moderate  9. Dizziness: (P) None  10. Ear Pain: (P) Mild or slight  11. Facial pain/pressure: (P) Very mild  12. Decreased Sense of Smell/Taste: (P) Moderate  13. Difficulty falling asleep: (P) Mild or slight  14. Wake up at night: (P) Moderate  15. Lack of a good night's sleep: (P) Moderate  16. Wake up tired: (P) Moderate  17. Fatigue: (P) Mild or slight  18. Reduced Productivity: (P) Mild or slight  19. Reduced Concentration: (P) Very mild  20. Frustrated/restless/irritable:  (P) None  21. Sad: (P) None  22. Embarrassed: (P) Very mild  Total Score: (P) 37    [place] [facility]:  The patient denies any sinonasal surgeries.    Review of systems: A 14-point review of systems has been conducted and was negative for any notable symptoms, except as dictated in the history of present illness.     Medical History:  Past Medical History:   Diagnosis Date     Arthritis 01/01/2016     Diverticulosis of colon (without mention of hemorrhage)     repeat 2012     ADINA (obstructive sleep apnea)      Tinnitus         Surgical History:   Past Surgical History:   Procedure Laterality Date     ARTHROSCOPY KNEE  8/4/2011    Procedure:ARTHROSCOPY KNEE; With debridement of medial and lateral meniscus  ; Surgeon:AMARILIS PETERS; Location:[facility]     HERNIA REPAIR, UMBILICAL       TOE SURGERY      feb 2006     ZZC REPAIR CRUCIATE LIGAMENT,KNEE      both knees-ACL        Family History:  Family History   Problem Relation Age of Onset     Arthritis Mother         osteoporosis     Respiratory Mother         on oxygen-was smoker, COPD     Osteoporosis Mother         significant problem     Cerebrovascular Disease Father         Age 85-87     C.A.D. Father         Rapid heart rates     Other - See Comments Father 62        petuitary tumor     Cerebrovascular Disease Paternal Grandmother         Age about 80     Arthritis Paternal Grandmother      Cerebrovascular Disease Paternal Grandfather         age 94        Social History:   Social History     Socioeconomic History     Marital status:      Spouse name: Manasa     Number of children: 2     Years of education: 23   Occupational History     Occupation: teacher     Employer: [employer]   Tobacco Use     Smoking status: Never     Smokeless tobacco: Never   Substance and Sexual Activity     Alcohol use: Yes     Comment: once/week     Drug use: No     Sexual activity: Yes     Partners: Female     Birth control/protection: None     Comment:  none needed   Other Topics Concern     Parent/sibling w/ CABG, MI or angioplasty before 65F 55M? No   Social History Narrative    Dairy/d 2-3 servings/d.     Caffeine 0-3 servings/d    Exercise 3-4 x week    Sunscreen used - Yes    Seatbelts used - Yes    Working smoke/CO detectors in the home - Yes    Guns stored in the home - No    Self Breast Exams - NOT APPLICABLE    Self Testicular Exam - Yes    Eye Exam up to date - Yes    Dental Exam up to date - Yes    Pap Smear up to date - NOT APPLICABLE    Mammogram up to date - NOT APPLICABLE    PSA up to date - NOT APPLICABLE    Dexa Scan up to date - NOT APPLICABLE    Flex Sig / Colonoscopy up to date - Yes    Immunizations up to date - Yes    Abuse: Current or Past(Physical, Sexual or Emotional)- No    Do you feel safe in your environment - Yes        07/2010                    Physical Exam:  Vital signs: /82 (BP Location: Left arm, Patient Position: Sitting, Cuff Size: Adult Large)   Pulse 65   Temp (!) 96.2  F (35.7  C) (Temporal)   Resp 24   Wt 105.2 kg (232 lb)   SpO2 97%   BMI 31.46 kg/m     General Appearance: No acute distress, appropriate demeanor, conversant  Eyes: moist conjunctivae; EOMI; pupils symmetric; visual acuity grossly intact; no proptosis  Head: normocephalic; overall symmetric appearance without deformity  Face: overall symmetric without deformity; HB I/VI  Ears: Normal appearance of external ear; external meatus normal in appearance; TMs intact without perforation bilaterally;   Nose: No external deformity; inferior turbinates without significant hypertrophy  Oral Cavity/oropharynx: Normal appearance of mucosa; tongue midline; no mass or lesions; tonsils; oropharynx without obvious mucosal abnormality  Neck: no palpable lymphadenopathy; thyroid without palpable nodules  Lungs: symmetric chest rise; no wheezing  CV: Good distal perfusion; normal hear rate  Extremities: No deformity  Neurologic Exam: Cranial nerves II-XII are grossly  intact; no focal deficit    Procedure Note  Procedure performed: Rigid nasal endoscopy with right-sided biopsy  Indication: To evaluate for sinonasal pathology not visualized on routine anterior rhinoscopy; right sinonasal mass   Anesthesia: 4% topical lidocaine with 0.05% oxymetazoline  Description of procedure: A 30 degree, 3 mm rigid endoscope was inserted into bilateral nasal cavities and the nasal valves, nasal cavity, middle meatus, sphenoethmoid recess, and nasopharynx were thoroughly evaluated for evidence of obstruction, edema, purulence, polyps and/or mass/lesion.     Under endoscopic visualization, several specimen were obtained from the right nasal cavity portion of his sinonasal mass using a straight through-cutting forcep.       Joey-Nestor Endoscopic Scoring System  Endoscopic observation Right Left   Polyps in middle meatus (0 = absent, 1 = restricted to middle meatus, 2 = Beyond middle meatus) 2 0   Discharge (0 = absent, 1 = thin and clear, 2 = thick, purulent) 1 0   Edema (0 = absent, 1 = mild-moderate, 2 = moderate-severe) 1 0   Crusting (0 = absent, 1 = mild-moderate, 2 = moderate-severe) 0 0   Scarring (0= absent, 1 = mild-moderate, 2 = moderate-severe) 0 0   Total 4 0     Findings  RT: Papillomatous appearing growth of RNC fills RNC. Biopsy sample obtained from RNC  LT: MM and SER clear    The patient tolerated the procedure well without complication.     Laboratory Review:  n/a    Imaging Review:  CT sinus 11/28/2022  1. Complete opacification of the right maxillary antrum with polypoid extension into the right nasal cavity extending into the nasopharynx is nonspecific, but may represent inverted papilloma or potentially sinonasal polyposis. Direct visualization is recommended for further evaluation.  2. Near complete opacification of the left maxillary antrum may be  seen in the setting of acute sinusitis.      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 A biopsy was performed today. For his inverted papilloma, we discussed natural course of inverted papilloma, risk of malignant conversion (cited at 7-15%), risk of recurrence after surgical removal (cited at 10-15%).      I recommended endoscopic resection of his right maxillary sinus inverted papilloma and functional surgery to address chronic sinus disease in his left maxillary sinus.  I discussed the risks, benefits, and alternatives to endoscopic sinonasal surgery, including but not limited to: pain, bleeding, infection, CSF leak, orbital injury resulting in vision changes and/or vision loss, septal perforation and/or hematoma, dental hypesthesia, facial numbness, need for continued medical management after surgery, and need for additional procedures, among others. He was allowed to ask questions, which I answered to the best of my ability.     We also discussed risks unique to a modified endoscopic Denker's approach, including nasolacrimal duct obstruction and facial numbness.     After this discussion, surgery was elected. I recommended CT prior to surgery, and we will reach out to schedule surgery.    Chet Cornejo MD    [facility]  Rhinology  Endoscopic Skull Base Surgery  [facility]  Department of Otolaryngology - Head & Neck Surgery    Scribe Disclosure:  I, Ayan Lloyd, am serving as a scribe to document services personally performed by Chet ROMERO  MD Clemente at this visit, based upon the provider's statements to me. All documentation has been reviewed by the aforementioned provider prior to being entered into the official medical record.

## 2022-12-07 ENCOUNTER — PRE VISIT (OUTPATIENT)
Dept: OTOLARYNGOLOGY | Facility: CLINIC | Age: 67
End: 2022-12-07

## 2022-12-07 ENCOUNTER — OFFICE VISIT (OUTPATIENT)
Dept: OTOLARYNGOLOGY | Facility: CLINIC | Age: 67
End: 2022-12-07
Payer: COMMERCIAL

## 2022-12-07 ENCOUNTER — MYC MEDICAL ADVICE (OUTPATIENT)
Dept: SLEEP MEDICINE | Facility: CLINIC | Age: 67
End: 2022-12-07

## 2022-12-07 VITALS
SYSTOLIC BLOOD PRESSURE: 130 MMHG | RESPIRATION RATE: 24 BRPM | WEIGHT: 232 LBS | HEART RATE: 65 BPM | OXYGEN SATURATION: 97 % | BODY MASS INDEX: 31.46 KG/M2 | DIASTOLIC BLOOD PRESSURE: 82 MMHG | TEMPERATURE: 96.2 F

## 2022-12-07 DIAGNOSIS — D36.9 INVERTED PAPILLOMA: Primary | ICD-10-CM

## 2022-12-07 DIAGNOSIS — J34.89 NASAL OBSTRUCTION: ICD-10-CM

## 2022-12-07 DIAGNOSIS — J32.0 CHRONIC MAXILLARY SINUSITIS: ICD-10-CM

## 2022-12-07 PROCEDURE — 31237 NSL/SINS NDSC SURG BX POLYPC: CPT | Mod: RT | Performed by: OTOLARYNGOLOGY

## 2022-12-07 PROCEDURE — 99204 OFFICE O/P NEW MOD 45 MIN: CPT | Mod: 25 | Performed by: OTOLARYNGOLOGY

## 2022-12-07 PROCEDURE — 88304 TISSUE EXAM BY PATHOLOGIST: CPT | Mod: TC | Performed by: OTOLARYNGOLOGY

## 2022-12-07 PROCEDURE — 88304 TISSUE EXAM BY PATHOLOGIST: CPT | Mod: 26 | Performed by: PATHOLOGY

## 2022-12-07 ASSESSMENT — PAIN SCALES - GENERAL: PAINLEVEL: NO PAIN (0)

## 2022-12-07 NOTE — PATIENT INSTRUCTIONS
You were seen in the ENT Clinic today by Dr. Cornejo. If you have any questions or concerns after your appointment, please contact us (see below)      2.   The following recommendations have been made based upon your appointment today:   -    3.   Please return to the ENT clinic            How to Contact Us:  Send a [employer] message to your provider. Our team will respond to you via [employer]. Occasionally, we will need to call you to get further information.  For urgent matters (Monday-Friday), call the ENT Clinic: [phone] and speak with a call center team member - they will route your call appropriately.   If you'd like to speak directly with a nurse, please find our contact information below. We do our best to check voicemail frequently throughout the day, and will work to call you back within 1-2 days. For urgent matters, please use the general clinic phone numbers listed above.        Lisa CARUSO RN  ENT RN Care Coordinator  Direct: [phone]  Amy GIBSON LPN  Direct: [phone]         [facility]  Department of Otolaryngology       Surgery Teaching    1. Someone from our scheduling department will call you within approximately one week to get you scheduled with your provider for surgery. If no one has called you in one week, please notify us.    2. You must have a physical exam (called  history and physical ) within 30 days of surgery. You may complete this with your primary care provider.   A. If your provider is outside of the [facility] network please have them complete the preoperative forms provided to you in the surgery packet you will be mailed and be sure to have your provider fax them to the appropriate location prior to surgery. For surgery at the [facility] the fax number is:[phone]. For surgery at the [facility] the fax number is [phone].  B. In some cases we may have you see our Preoperative Assessment Center. If we have expressed this to you, our  will set up your appointment with  them when they call to set up your surgery.    3. For same-day surgery, you must arrange for an adult to take you home from the Center. An adult must stay with you for the first 24 hours after surgery. You cannot drive for 24 hours.     4. Ask your doctor what medicines are safe before surgery. For over the counter medications and supplements it is advised that you do NOT TAKE MOTRIN, IBUPROFEN, ASPIRIN, ALEVE, GARLIC SUPPLEMENTS or FISH OIL x 7 days prior to surgery (to prevent excess bleeding and bruising at time of surgery). If your provider advises you to take any medication the morning of surgery you should take this with a sip of water.    5. A few days prior to surgery a nurse will call you to review your health history and instructions for before and after surgery. They will give you your final arrival time based upon your scheduled arrival time for surgery.    6. Call the surgical team if there's any change in your health prior to surgery. Things you should call for include but are not limited to signs of a cold or the flu (sore throat, runny nose, cough, rash, fever). Other things to notify them for is for any open wounds (cuts, scrapes, scratches) near to the surgery site.    7. If you drink alcohol, stop drinking alcohol at least 24 hours before surgery.    8. If you smoke, stop or at least cut down on smoking 24 hours before surgery.    9.Take a bath or shower the night before and the morning of surgery (as told by your surgeon). Use an antiseptic soap. If your doctor does not give you special soap, buy Hibiclens or Dary-Stat at the drug store or ask the pharmacist to suggest a brand. You will wash with this from the neck down, washing your hair and face as you would normally.   A. When you are done with your shower please be sure to use clean towels to dry with, have clean linens on your bed, and put on clean clothes each time.   B. DO NOT put on lotion, powder, perfume, deodorant or make-up after  bathing.    10. You can eat a normal meal the night before surgery. Do not eat any solid foods or drink any milk products for 8 hours before surgery.     11. You may drink clear liquids until 2 hours before surgery. Clear liquids include water, Gatorade, apple juice and liquids you can see through.    12. No eating or drinking 2 hours prior to surgery until after surgery. Your post op team will review any diet limitations you might have and when you can start eating and drinking again after surgery.      If you have any questions before or after surgery please call:    ORIANA Tee  [facility]  Department of Otolaryngology  [phone]

## 2022-12-07 NOTE — LETTER
12/7/2022       RE: Humberto Pino  [address]  [place] [address]     Dear Colleague,    Thank you for referring your patient, Humberto Pino, to the [facility] [place] at [facility]. Please see a copy of my visit note below.      [facility]  New Patient Visit      Encounter date:   December 7, 2022    Referring Provider:   No referring provider defined for this encounter.    Chief Complaint: inverted papilloma    History of Present Illness:   Humberto Pino is a 67 year old male who presents for consultation regarding inverted papilloma on CT. Patient was seen by Anya Rosas on 11/28/22 for nasal congestion right worse than left. He had tried Flonase without improvement. She ordered CT which showed growth in the right nasal cavity that is suspicious for an inverted squamous papilloma.    Today, he reports that he is not experiencing ear pain any longer. In the morning, his ears pop with his first glass of water. If he doesn't drink lots of water, he has trouble swallowing food, so he uses lots of water. Occasionally, he has felt slight light headedness but not dizziness. His sense of taste and smell have been poor. He uses a CPAP. His symptoms started around gradually around early September. He has difficulty breathing through the nose which is worse on the right. Advil helps with the pain and pressure. He has no prior history of sinus or nasal surgery in the past.     Sino-Nasal Outcome Test (SNOT - 22)  1. Need to Blow Nose: (P) Very mild  2. Nasal Blockage: (P) Moderate  3. Sneezing: (P) Mild or slight  4. Runny Nose: (P) Moderate  5. Cough: (P) None  6. Post-nasal discharge: (P) Mild or slight  7. Thick nasal discharge: (P) None  8. Ear fullness: (P) Moderate  9. Dizziness: (P) None  10. Ear Pain: (P) Mild or slight  11. Facial pain/pressure: (P) Very mild  12. Decreased Sense of Smell/Taste: (P)  Moderate  13. Difficulty falling asleep: (P) Mild or slight  14. Wake up at night: (P) Moderate  15. Lack of a good night's sleep: (P) Moderate  16. Wake up tired: (P) Moderate  17. Fatigue: (P) Mild or slight  18. Reduced Productivity: (P) Mild or slight  19. Reduced Concentration: (P) Very mild  20. Frustrated/restless/irritable: (P) None  21. Sad: (P) None  22. Embarrassed: (P) Very mild  Total Score: (P) 37    [place] [facility]:  The patient denies any sinonasal surgeries.    Review of systems: A 14-point review of systems has been conducted and was negative for any notable symptoms, except as dictated in the history of present illness.     Medical History:  Past Medical History:   Diagnosis Date     Arthritis 01/01/2016     Diverticulosis of colon (without mention of hemorrhage)     repeat 2012     ADINA (obstructive sleep apnea)      Tinnitus         Surgical History:   Past Surgical History:   Procedure Laterality Date     ARTHROSCOPY KNEE  8/4/2011    Procedure:ARTHROSCOPY KNEE; With debridement of medial and lateral meniscus  ; Surgeon:AMARILIS PETERS; Location:[facility]     HERNIA REPAIR, UMBILICAL       TOE SURGERY      feb 2006     ZZC REPAIR CRUCIATE LIGAMENT,KNEE      both knees-ACL        Family History:  Family History   Problem Relation Age of Onset     Arthritis Mother         osteoporosis     Respiratory Mother         on oxygen-was smoker, COPD     Osteoporosis Mother         significant problem     Cerebrovascular Disease Father         Age 85-87     C.A.D. Father         Rapid heart rates     Other - See Comments Father 62        petuitary tumor     Cerebrovascular Disease Paternal Grandmother         Age about 80     Arthritis Paternal Grandmother      Cerebrovascular Disease Paternal Grandfather         age 94        Social History:   Social History     Socioeconomic History     Marital status:      Spouse name: Manasa     Number of children: 2     Years of education: 23    Occupational History     Occupation: teacher     Employer: [employer]   Tobacco Use     Smoking status: Never     Smokeless tobacco: Never   Substance and Sexual Activity     Alcohol use: Yes     Comment: once/week     Drug use: No     Sexual activity: Yes     Partners: Female     Birth control/protection: None     Comment: none needed   Other Topics Concern     Parent/sibling w/ CABG, MI or angioplasty before 65F 55M? No   Social History Narrative    Dairy/d 2-3 servings/d.     Caffeine 0-3 servings/d    Exercise 3-4 x week    Sunscreen used - Yes    Seatbelts used - Yes    Working smoke/CO detectors in the home - Yes    Guns stored in the home - No    Self Breast Exams - NOT APPLICABLE    Self Testicular Exam - Yes    Eye Exam up to date - Yes    Dental Exam up to date - Yes    Pap Smear up to date - NOT APPLICABLE    Mammogram up to date - NOT APPLICABLE    PSA up to date - NOT APPLICABLE    Dexa Scan up to date - NOT APPLICABLE    Flex Sig / Colonoscopy up to date - Yes    Immunizations up to date - Yes    Abuse: Current or Past(Physical, Sexual or Emotional)- No    Do you feel safe in your environment - Yes        07/2010                    Physical Exam:  Vital signs: /82 (BP Location: Left arm, Patient Position: Sitting, Cuff Size: Adult Large)   Pulse 65   Temp (!) 96.2  F (35.7  C) (Temporal)   Resp 24   Wt 105.2 kg (232 lb)   SpO2 97%   BMI 31.46 kg/m     General Appearance: No acute distress, appropriate demeanor, conversant  Eyes: moist conjunctivae; EOMI; pupils symmetric; visual acuity grossly intact; no proptosis  Head: normocephalic; overall symmetric appearance without deformity  Face: overall symmetric without deformity; HB I/VI  Ears: Normal appearance of external ear; external meatus normal in appearance; TMs intact without perforation bilaterally;   Nose: No external deformity; inferior turbinates without significant hypertrophy  Oral Cavity/oropharynx: Normal  appearance of mucosa; tongue midline; no mass or lesions; tonsils; oropharynx without obvious mucosal abnormality  Neck: no palpable lymphadenopathy; thyroid without palpable nodules  Lungs: symmetric chest rise; no wheezing  CV: Good distal perfusion; normal hear rate  Extremities: No deformity  Neurologic Exam: Cranial nerves II-XII are grossly intact; no focal deficit    Procedure Note  Procedure performed: Rigid nasal endoscopy with right-sided biopsy  Indication: To evaluate for sinonasal pathology not visualized on routine anterior rhinoscopy; right sinonasal mass   Anesthesia: 4% topical lidocaine with 0.05% oxymetazoline  Description of procedure: A 30 degree, 3 mm rigid endoscope was inserted into bilateral nasal cavities and the nasal valves, nasal cavity, middle meatus, sphenoethmoid recess, and nasopharynx were thoroughly evaluated for evidence of obstruction, edema, purulence, polyps and/or mass/lesion.     Under endoscopic visualization, several specimen were obtained from the right nasal cavity portion of his sinonasal mass using a straight through-cutting forcep.       Joey-Nestor Endoscopic Scoring System  Endoscopic observation Right Left   Polyps in middle meatus (0 = absent, 1 = restricted to middle meatus, 2 = Beyond middle meatus) 2 0   Discharge (0 = absent, 1 = thin and clear, 2 = thick, purulent) 1 0   Edema (0 = absent, 1 = mild-moderate, 2 = moderate-severe) 1 0   Crusting (0 = absent, 1 = mild-moderate, 2 = moderate-severe) 0 0   Scarring (0= absent, 1 = mild-moderate, 2 = moderate-severe) 0 0   Total 4 0     Findings  RT: Papillomatous appearing growth of RNC fills RNC. Biopsy sample obtained from RNC  LT: MM and SER clear    The patient tolerated the procedure well without complication.     Laboratory Review:  n/a    Imaging Review:  CT sinus 11/28/2022  1. Complete opacification of the right maxillary antrum with polypoid extension into the right nasal cavity extending into the  nasopharynx is nonspecific, but may represent inverted papilloma or potentially sinonasal polyposis. Direct visualization is recommended for further evaluation.  2. Near complete opacification of the left maxillary antrum may be  seen in the setting of acute sinusitis.      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 A biopsy was performed today. For his inverted papilloma, we discussed natural course of inverted papilloma, risk of malignant conversion (cited at 7-15%), risk of recurrence after surgical removal (cited at 10-15%).      I recommended endoscopic resection of his right maxillary sinus inverted papilloma and functional surgery to address chronic sinus disease in his left maxillary sinus.  I discussed the risks, benefits, and alternatives to endoscopic sinonasal surgery, including but not limited to: pain, bleeding, infection, CSF leak, orbital injury resulting in vision changes and/or vision loss, septal perforation and/or hematoma, dental hypesthesia, facial numbness, need for continued medical management after surgery, and need for additional procedures, among others. He was allowed to ask questions, which I answered to the best of my ability.     We also discussed risks unique to a modified endoscopic Denker's approach, including nasolacrimal duct obstruction and facial numbness.     After this discussion, surgery was elected. I recommended CT prior to surgery, and we will  reach out to schedule surgery.    Chet Cornejo MD    [facility]  Rhinology  Endoscopic Skull Base Surgery  [facility]  Department of Otolaryngology - Head & Neck Surgery    Scribe Disclosure:  I, Ayan Lloyd, am serving as a scribe to document services personally performed by Chet Cornejo MD at this visit, based upon the provider's statements to me. All documentation has been reviewed by the aforementioned provider prior to being entered into the official medical record.       Again, thank you for allowing me to participate in the care of your patient.      Sincerely,    Chet Cornejo MD

## 2022-12-12 ENCOUNTER — TELEPHONE (OUTPATIENT)
Dept: OTOLARYNGOLOGY | Facility: CLINIC | Age: 67
End: 2022-12-12

## 2022-12-12 ENCOUNTER — MYC MEDICAL ADVICE (OUTPATIENT)
Dept: OTOLARYNGOLOGY | Facility: CLINIC | Age: 67
End: 2022-12-12

## 2022-12-12 PROBLEM — J34.89 NASAL OBSTRUCTION: Status: ACTIVE | Noted: 2022-12-12

## 2022-12-12 PROBLEM — J32.0 CHRONIC MAXILLARY SINUSITIS: Status: ACTIVE | Noted: 2022-12-12

## 2022-12-12 LAB
PATH REPORT.COMMENTS IMP SPEC: NORMAL
PATH REPORT.FINAL DX SPEC: NORMAL
PATH REPORT.GROSS SPEC: NORMAL
PATH REPORT.MICROSCOPIC SPEC OTHER STN: NORMAL
PATH REPORT.RELEVANT HX SPEC: NORMAL
PHOTO IMAGE: NORMAL

## 2022-12-12 NOTE — TELEPHONE ENCOUNTER
Called patient to schedule surgery with Dr. Cornejo    Date of Surgery: 2/13    Location of surgery: [facility]    Pre-Op H&P: PAC scheduled 1/30    Pre/Post Imaging:  Yes transferred to imaging to schedule CT    Discussed COVID-19 Testing: Not Applicable    Post-Op Appt Date: 1 week    Surgery Packet Mailed: 12/13      Additional comments: DAVID Martines on 12/12/2022 at 8:41 AM

## 2022-12-14 NOTE — TELEPHONE ENCOUNTER
Natty Anderson MD        PATIENT NAME: Kirsty Pedro  : 1995  DATE: 22  MRN: [id number]      Billing Provider: Natty Anderson MD  Level of Service: [place] OFFICE/OUTPT VISIT, EST, LEVL III, 20-29 MIN  Patient PCP Information       Provider PCP Type    Debbie Garcia NP General            Reason for Visit / Chief Complaint: Vaginitis (Been about a month with itching and lately its been the discharge) and Otalgia (Right ear pain )       History of Present Illness      Kirsty Pedro presents to the clinic with Vaginitis (Been about a month with itching and lately its been the discharge) and Otalgia (Right ear pain )     Vaginal discharge, white yellow itchy, has tried monistat over the counter with no changes noted    Otalgia   There is pain in the right ear. This is a new problem. Pertinent negatives include no diarrhea, headaches, hearing loss, neck pain, rhinorrhea or vomiting.     Review of Systems     Review of Systems   Constitutional:  Negative for activity change and unexpected weight change.   HENT:  Positive for ear pain. Negative for hearing loss, rhinorrhea and trouble swallowing.    Eyes:  Negative for discharge and visual disturbance.   Respiratory:  Negative for chest tightness and wheezing.    Cardiovascular:  Negative for chest pain and palpitations.   Gastrointestinal:  Negative for blood in stool, constipation, diarrhea and vomiting.   Endocrine: Positive for polydipsia and polyuria.   Genitourinary:  Negative for difficulty urinating, dysuria, hematuria and menstrual problem.   Musculoskeletal:  Negative for arthralgias, joint swelling and neck pain.   Neurological:  Negative for weakness and headaches.   Psychiatric/Behavioral:  Negative for confusion and dysphoric mood.      Medical / Social / Family History     Past Medical History:   Diagnosis Date    Anxiety     Depression     History of COVID-19 2021       Past Surgical History:   Procedure Laterality Date    BREAST SURGERY  Reason for call:  Other   Patient called regarding (reason for call): call back    Additional comments: per pt , he has a neck injury and wanted to know if it will be okay for him to stop his cpap for a bit. He will like a call back from a nurse to give more details.     Phone number to reach patient:  Home number on file [phone] (home)    Best Time:  Anytime     Can we leave a detailed message on this number?  NO    Travel screening: Not Applicable     "     staph in left breast     SECTION         Social History  Ms.  reports that she has never smoked. She has never used smokeless tobacco. She reports that she does not drink alcohol and does not use drugs.    Family History  Ms.'s family history includes Breast cancer in her maternal grandfather; Dementia in her maternal grandfather; Melanoma in her maternal grandmother; No Known Problems in her father and mother; Parkinsonism in her maternal grandfather; Skin cancer in her maternal grandmother.    Medications and Allergies     Medications  Outpatient Medications Marked as Taking for the 22 encounter (Office Visit) with Natty Anderson MD   Medication Sig Dispense Refill    aspirin (ECOTRIN) 81 MG EC tablet Take 1 tablet (81 mg total) by mouth once daily. 30 tablet 11    citalopram (CELEXA) 20 MG tablet Take 20 mg by mouth once daily.         Allergies  Review of patient's allergies indicates:  No Known Allergies    Physical Examination   /83 (BP Location: Left arm, Patient Position: Sitting, BP Method: Medium (Automatic))   Pulse 96   Temp 98.3 °F (36.8 °C) (Oral)   Resp 18   Ht 4' 10" (1.473 m)   Wt 85 kg (187 lb 6.4 oz)   LMP 2022   SpO2 99%   BMI 39.17 kg/m²     Physical Exam  Constitutional:       Appearance: Normal appearance. She is normal weight.   Cardiovascular:      Rate and Rhythm: Normal rate and regular rhythm.      Pulses: Normal pulses.      Heart sounds: Normal heart sounds.   Abdominal:      Comments:  via doopler   Musculoskeletal:         General: Normal range of motion.   Skin:     General: Skin is warm.   Neurological:      General: No focal deficit present.      Mental Status: She is alert.   Psychiatric:         Mood and Affect: Mood normal.         Behavior: Behavior normal.         Judgment: Judgment normal.       Recent Results (from the past 24 hour(s))   POCT URINALYSIS W/O SCOPE    Collection Time: 22 10:11 AM   Result Value Ref Range    " Color, UA Dark Yellow     Spec Grav UA >1.030     pH, UA 5.5     WBC, UA moderate     Nitrite, UA negative     Protein, POC negative     Glucose, UA negative     Ketones, UA negative     Bilirubin, POC negative     Urobilinogen, UA 1.0     Blood, UA trace         Assessment and Plan (including Health Maintenance)     Plan:         Problem List Items Addressed This Visit    None  Visit Diagnoses       BV (bacterial vaginosis)    -  Primary    Relevant Medications    metroNIDAZOLE (FLAGYL) 500 MG tablet    Yeast infection        Relevant Medications    metroNIDAZOLE (FLAGYL) 500 MG tablet    fluconazole (DIFLUCAN) 150 MG Tab    Other Relevant Orders    Bacterial Vaginosis    Chlamydia/GC, PCR    Urinary tract infection without hematuria, site unspecified        Relevant Medications    amoxicillin (AMOXIL) 500 MG capsule    Other Relevant Orders    POCT URINALYSIS W/O SCOPE (Completed)    Chlamydia/GC, PCR    Urine culture    Non-recurrent acute serous otitis media of right ear        Relevant Medications    ofloxacin (FLOXIN) 0.3 % otic solution    amoxicillin (AMOXIL) 500 MG capsule          - take a daily probiotic if able    Follow up if symptoms worsen or fail to improve.        Signature:  Natty Anderson MD      Date of encounter: 12/14/22

## 2022-12-15 ENCOUNTER — MYC MEDICAL ADVICE (OUTPATIENT)
Dept: INTERNAL MEDICINE | Facility: CLINIC | Age: 67
End: 2022-12-15

## 2022-12-16 ENCOUNTER — VIRTUAL VISIT (OUTPATIENT)
Dept: INTERNAL MEDICINE | Facility: CLINIC | Age: 67
End: 2022-12-16
Payer: COMMERCIAL

## 2022-12-16 ENCOUNTER — TELEPHONE (OUTPATIENT)
Dept: OTOLARYNGOLOGY | Facility: CLINIC | Age: 67
End: 2022-12-16

## 2022-12-16 DIAGNOSIS — D14.0 INVERTED PAPILLOMA OF NASAL CAVITY: Primary | ICD-10-CM

## 2022-12-16 PROCEDURE — 99212 OFFICE O/P EST SF 10 MIN: CPT | Mod: 95 | Performed by: PHYSICIAN ASSISTANT

## 2022-12-16 NOTE — TELEPHONE ENCOUNTER
Writer called patient and reviewed results. Patient verbalized understanding and has no further questions.     Lisa Sanchez RN on 12/16/2022 at 1:33 PM

## 2022-12-16 NOTE — TELEPHONE ENCOUNTER
----- Message from Chet Cornejo MD sent at 12/14/2022  9:38 AM CST -----  Artie Gonzalez,   Can we let Christian know about his path results?  No malignancy, which is great news. Can proceed with surgery as discussed.   Dagoberto

## 2022-12-16 NOTE — TELEPHONE ENCOUNTER
Phone call made to patient. He is scheduled for endoscopic resection of R maxillay sinus inverted papilloma.  He will continue to use CPAP regularly, and will take a break after surgery if needed.  His questions regarding CPAP pressures were answered.  At this time, I am not planning any change in therapy.

## 2022-12-16 NOTE — TELEPHONE ENCOUNTER
FUTURE VISIT INFORMATION      SURGERY INFORMATION:    Date: 23    [facility]    Surgeon:  Chet Cornejo MD    Anesthesia Type:  general    Procedure: LEFT ENDOSCOPIC IMAGE-GUIDED MAXILLARY ANTROSTOMY WITH TISSUE REMOVAL RIGHT ENDOSCOPIC, IMAGE-GUIDED MEDIAL MAXILLECTOMY VIA DENKER'S APPROACH    Consult: ov 22    RECORDS REQUESTED FROM:       Primary Care Provider: [facility]    Pertinent Medical History: flash    Most recent EKG+ Tracin22

## 2023-01-30 ENCOUNTER — OFFICE VISIT (OUTPATIENT)
Dept: SURGERY | Facility: CLINIC | Age: 68
End: 2023-01-30
Payer: COMMERCIAL

## 2023-01-30 ENCOUNTER — ANCILLARY PROCEDURE (OUTPATIENT)
Dept: CT IMAGING | Facility: CLINIC | Age: 68
End: 2023-01-30
Attending: OTOLARYNGOLOGY
Payer: COMMERCIAL

## 2023-01-30 ENCOUNTER — PRE VISIT (OUTPATIENT)
Dept: SURGERY | Facility: CLINIC | Age: 68
End: 2023-01-30

## 2023-01-30 ENCOUNTER — LAB (OUTPATIENT)
Dept: LAB | Facility: CLINIC | Age: 68
End: 2023-01-30
Payer: COMMERCIAL

## 2023-01-30 ENCOUNTER — ANESTHESIA EVENT (OUTPATIENT)
Dept: SURGERY | Facility: AMBULATORY SURGERY CENTER | Age: 68
End: 2023-01-30
Payer: COMMERCIAL

## 2023-01-30 VITALS
WEIGHT: 230 LBS | RESPIRATION RATE: 24 BRPM | OXYGEN SATURATION: 98 % | DIASTOLIC BLOOD PRESSURE: 101 MMHG | SYSTOLIC BLOOD PRESSURE: 168 MMHG | BODY MASS INDEX: 30.48 KG/M2 | TEMPERATURE: 97.7 F | HEART RATE: 70 BPM | HEIGHT: 73 IN

## 2023-01-30 DIAGNOSIS — J32.0 CHRONIC MAXILLARY SINUSITIS: ICD-10-CM

## 2023-01-30 DIAGNOSIS — D36.9 INVERTED PAPILLOMA: ICD-10-CM

## 2023-01-30 DIAGNOSIS — Z01.818 PRE-OP EXAMINATION: ICD-10-CM

## 2023-01-30 DIAGNOSIS — Z01.818 PRE-OP EXAMINATION: Primary | ICD-10-CM

## 2023-01-30 DIAGNOSIS — J34.89 NASAL OBSTRUCTION: ICD-10-CM

## 2023-01-30 LAB
ERYTHROCYTE [DISTWIDTH] IN BLOOD BY AUTOMATED COUNT: 12.8 % (ref 10–15)
HCT VFR BLD AUTO: 48.9 % (ref 40–53)
HGB BLD-MCNC: 15.8 G/DL (ref 13.3–17.7)
MCH RBC QN AUTO: 29.6 PG (ref 26.5–33)
MCHC RBC AUTO-ENTMCNC: 32.3 G/DL (ref 31.5–36.5)
MCV RBC AUTO: 92 FL (ref 78–100)
PLATELET # BLD AUTO: 153 10E3/UL (ref 150–450)
RBC # BLD AUTO: 5.33 10E6/UL (ref 4.4–5.9)
WBC # BLD AUTO: 5 10E3/UL (ref 4–11)

## 2023-01-30 PROCEDURE — 85027 COMPLETE CBC AUTOMATED: CPT | Performed by: PATHOLOGY

## 2023-01-30 PROCEDURE — 99203 OFFICE O/P NEW LOW 30 MIN: CPT | Performed by: PHYSICIAN ASSISTANT

## 2023-01-30 PROCEDURE — 70486 CT MAXILLOFACIAL W/O DYE: CPT | Mod: GC | Performed by: RADIOLOGY

## 2023-01-30 PROCEDURE — 36415 COLL VENOUS BLD VENIPUNCTURE: CPT | Performed by: PATHOLOGY

## 2023-01-30 ASSESSMENT — LIFESTYLE VARIABLES: TOBACCO_USE: 0

## 2023-01-30 ASSESSMENT — ENCOUNTER SYMPTOMS: SEIZURES: 0

## 2023-01-30 ASSESSMENT — PAIN SCALES - GENERAL: PAINLEVEL: MILD PAIN (2)

## 2023-01-30 NOTE — PATIENT INSTRUCTIONS
Preparing for Your Surgery      Name:  Humberto Pino   MRN:  [id number]   :  1955   Today's Date:  2023         Arriving for surgery:  Surgery date:  23  Arrival time:  9:00AM    Restrictions due to COVID 19:    Effective 2022:  2 visitors may accompany patient and wait in the Surgery Waiting Room.  All visitors must wear a mask and social distance.      parking is available for anyone with mobility limitations or disabilities. (Monday- Friday 7 am- 5 pm)    Please come to:    [facility]  [address] [place] [id number]    Please check in on the 5th floor at the Ambulatory Surgery Center.      What can I eat or drink?    -  You may eat and drink normally until 8 hours prior to arrival  time. (Until 23, 1AM)  -  You may have clear liquids until 2 hours prior to arrival  time. (Until 23, 7AM)    Examples of clear liquids:  Water  Clear broth  Juices (apple, white grape, white cranberry  and cider) without pulp  Noncarbonated, powder based beverages  (lemonade and Mitchell-Aid)  Sodas (Sprite, 7-Up, ginger ale and seltzer)  Coffee or tea (without milk or cream)  Gatorade    --No alcohol for at least 24 hours before surgery.    Which medicines can I take?    Hold Aspirin for 7 days before surgery.   Hold Multivitamins for 7 days before surgery.  Hold Supplements, Fish Oil for 7 days before surgery.  Hold Ibuprofen (Advil, Motrin) for 1 day before surgery--unless otherwise directed by surgeon.  Hold Naproxen (Aleve) for 4 days before surgery.      -  DO NOT take the following medications the day of surgery:    Fiber Tabs      How do I prepare myself?  - Please take 2 showers before surgery using Scrubcare or Hibiclens soap.    Use this soap only from the neck to your toes.     Leave the soap on your skin for one minute--then rinse thoroughly.      You may use your own shampoo and conditioner. No other hair products.   - Please remove all jewelry and  body piercings.  - No lotions, deodorants or fragrance.  - Bring your ID and insurance card.    -If you have a Deep Brain Stimulator, a Spinal Cord Stimulator, or any implanted Neuro Device, you must bring the remote to the Surgery Center.         ALL PATIENTS ARE REQUIRED TO HAVE A RESPONSIBLE ADULT TO DRIVE AND BE IN ATTENDANCE WITH THEM FOR 24 HOURS FOLLOWING SURGERY.     Covid testing policy as of 12/06/2022  Your surgeon will notify and schedule you for a COVID test if one is needed before surgery--please direct any questions or COVID symptoms to your surgeon      Questions or Concerns:    -For questions regarding the day of surgery, please contact the Ambulatory Surgery Center at [phone].    -If you have health changes between today and your surgery, please contact your surgeon.     - For questions after surgery, please contact your surgeon's office.

## 2023-01-30 NOTE — H&P
Pre-Operative H & P     CC:  Preoperative exam to assess for increased cardiopulmonary risk while undergoing surgery and anesthesia.    Date of Encounter: 1/30/2023  Primary Care Physician:  Rajiv Ludwig     Reason for visit:   Encounter Diagnoses   Name Primary?     Pre-op examination Yes     Chronic maxillary sinusitis        HPI  Humberto Pino is a 67 year old male who presents for pre-operative H & P in preparation for  Procedure Information     Case: [id number] Date/Time: 02/13/23 1030    Procedures:       LEFT ENDOSCOPIC IMAGE-GUIDED MAXILLARY ANTROSTOMY WITH TISSUE REMOVAL (Left: Nose)      RIGHT ENDOSCOPIC, IMAGE-GUIDED MEDIAL MAXILLECTOMY VIA DENKER'S APPROACH (Right: Nose)    Anesthesia type: General    Diagnosis:       Chronic maxillary sinusitis [J32.0]      Nasal obstruction [J34.89]    Pre-op diagnosis:       Chronic maxillary sinusitis [J32.0]      Nasal obstruction [J34.89]    Location: [place] [address] / [facility]    Providers: Chet Cornejo MD          The patient is a 67-year-old man with past medical history significant for mild sleep apnea, chronic sinusitis, tinnitus, obesity, history of diverticulosis, colon polyp, osteoarthritis and 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  Given his ongoing sinusitis issues and recent diagnosis he was seen by Dr. Cornejo in 12/7/2022.  He has been now counseled for the procedure as above.    History is obtained from the patient and chart review    Hx of abnormal bleeding or anti-platelet use: none      Past Medical History  Past Medical History:   Diagnosis Date     Arthritis 01/01/2016     Chronic maxillary sinusitis      Diverticulosis of colon (without mention of hemorrhage)     repeat 2012     ADINA (obstructive sleep apnea)      Tinnitus        Past Surgical History  Past Surgical History:   Procedure Laterality Date     ARTHROSCOPY KNEE  08/04/2011    Procedure:ARTHROSCOPY KNEE; With  debridement of medial and lateral meniscus  ; Surgeon:AMARILIS PETERS; [facility]     HERNIA REPAIR, UMBILICAL       right thumb, index finger, middle finger and ring finger trigger release  2021     TOE SURGERY      feb 2006     [facility] REPAIR CRUCIATE LIGAMENT,KNEE      both knees-ACL       Prior to Admission Medications  Current Outpatient Medications   Medication Sig Dispense Refill     Fiber TABS 2 tablets as needed        fish oil-omega-3 fatty acids 1000 MG capsule Take 1 g by mouth every morning 90 capsule 3     Multiple Vitamin (MULTI VITAMIN MENS PO) Take 1 tablet by mouth every morning       ORDER FOR DME Use CPAP as directed by your provider         Allergies  Allergies   Allergen Reactions     Hay Fever & [A.R.M.] Other (See Comments)     Sneezing & stuffy nose       Social History  Social History     Socioeconomic History     Marital status:      Spouse name: Manasa     Number of children: 2     Years of education: 23     Highest education level: Not on file   Occupational History     Occupation: teacher     Employer: [employer]   Tobacco Use     Smoking status: Never     Smokeless tobacco: Never   Vaping Use     Vaping Use: Not on file   Substance and Sexual Activity     Alcohol use: Yes     Comment: once/week     Drug use: No     Sexual activity: Yes     Partners: Female     Birth control/protection: None     Comment: none needed   Other Topics Concern     Parent/sibling w/ CABG, MI or angioplasty before 65F 55M? No   Social History Narrative    Dairy/d 2-3 servings/d.     Caffeine 0-3 servings/d    Exercise 3-4 x week    Sunscreen used - Yes    Seatbelts used - Yes    Working smoke/CO detectors in the home - Yes    Guns stored in the home - No    Self Breast Exams - NOT APPLICABLE    Self Testicular Exam - Yes    Eye Exam up to date - Yes    Dental Exam up to date - Yes    Pap Smear up to date - NOT APPLICABLE    Mammogram up to date - NOT APPLICABLE    PSA up to date - NOT  APPLICABLE    Dexa Scan up to date - NOT APPLICABLE    Flex Sig / Colonoscopy up to date - Yes    Immunizations up to date - Yes    Abuse: Current or Past(Physical, Sexual or Emotional)- No    Do you feel safe in your environment - Yes        07/2010                 Social Determinants of Health     Financial Resource Strain: Not on file   Food Insecurity: Not on file   Transportation Needs: Not on file   Physical Activity: Not on file   Stress: Not on file   Social Connections: Not on file   Intimate Partner Violence: Not on file   Housing Stability: Not on file       Family History  Family History   Problem Relation Age of Onset     Arthritis Mother         osteoporosis     Respiratory Mother         on oxygen-was smoker, COPD     Osteoporosis Mother         significant problem     Cerebrovascular Disease Father         Age 85-87     C.A.D. Father         Rapid heart rates     Other - See Comments Father 62        petuitary tumor     Cerebrovascular Disease Paternal Grandmother         Age about 80     Arthritis Paternal Grandmother      Cerebrovascular Disease Paternal Grandfather         age 94     Anesthesia Reaction No family hx of      Venous thrombosis No family hx of        Review of Systems  The complete review of systems is negative other than noted in the HPI or here.   Anesthesia Evaluation   Pt has had prior anesthetic. Type: General, MAC and Regional.    No history of anesthetic complications       ROS/MED HX  ENT/Pulmonary: Comment: Chronic sinusitis     Tinnitus    (+) sleep apnea, mild, doesn't use CPAP,  (-) tobacco use   Neurologic:  - neg neurologic ROS  (-) no seizures, no CVA and no TIA   Cardiovascular:     (+) -----Previous cardiac testing   Echo: Date: Results:    Stress Test: Date: Results:    ECG Reviewed: Date: 2/16/22 Results:  Sinus rhythm with sinus arrythmia  Cath: Date: Results:      METS/Exercise Tolerance: >4 METS    Hematologic:  - neg hematologic  ROS     Musculoskeletal:   (+)  "arthritis,     GI/Hepatic: Comment: Diverticulosis/ colon polyp   (-) GERD   Renal/Genitourinary:  - neg Renal ROS     Endo:     (+) Obesity,     Psychiatric/Substance Use:  - neg psychiatric ROS     Infectious Disease:  - neg infectious disease ROS     Malignancy:  - neg malignancy ROS     Other:  - neg other ROS          BP (!) 168/101 (BP Location: Left arm, Patient Position: Chair, Cuff Size: Adult Regular)   Pulse 70   Temp 97.7  F (36.5  C) (Oral)   Resp 24   Ht 1.842 m (6' 0.5\")   Wt 104.3 kg (230 lb)   SpO2 98%   BMI 30.76 kg/m      Physical Exam   Constitutional: Awake, alert, cooperative, no apparent distress, and appears stated age.  Eyes: Pupils equal, round and reactive to light, extra ocular muscles intact, sclera clear, conjunctiva normal.  HENT: Normocephalic, oral pharynx with moist mucus membranes, good dentition. No goiter appreciated.   Respiratory: Clear to auscultation bilaterally, no crackles or wheezing.  Cardiovascular: Regular rate and rhythm, normal S1 and S2, and no murmur noted.  Carotids +2, no bruits. No edema. Palpable pulses to radial  DP and PT arteries.   GI: Normal bowel sounds, soft, non-distended, non-tender, no masses palpated, no hepatosplenomegaly.    Lymph/Hematologic: No cervical lymphadenopathy and no supraclavicular lymphadenopathy.  Genitourinary:  defer  Skin: Warm and dry.  No rashes at anticipated surgical site.   Musculoskeletal: slightly limited ROM of neck. There is no redness, warmth, or swelling of the joints. Gross motor strength is normal.    Neurologic: Awake, alert, oriented to name, place and time. Cranial nerves II-XII are grossly intact. Gait is normal.   Neuropsychiatric: Calm, cooperative. Normal affect.     Prior Labs/Diagnostic Studies   All labs and imaging personally reviewed     EKG/ stress test - if available please see in ROS above   No results found.  No flowsheet data found.      The patient's records and results personally reviewed by " this provider.     Outside records reviewed from: Care Everywhere    LAB/DIAGNOSTIC STUDIES TODAY:     Latest Reference Range & Units 01/30/23 09:05   WBC 4.0 - 11.0 10e3/uL 5.0   Hemoglobin 13.3 - 17.7 g/dL 15.8   Hematocrit 40.0 - 53.0 % 48.9   Platelet Count 150 - 450 10e3/uL 153   RBC Count 4.40 - 5.90 10e6/uL 5.33   MCV 78 - 100 fL 92   MCH 26.5 - 33.0 pg 29.6   MCHC 31.5 - 36.5 g/dL 32.3   RDW 10.0 - 15.0 % 12.8      Latest Reference Range & Units 02/16/22 15:00   Sodium 136 - 145 mmol/L 140   Potassium 3.5 - 5.0 mmol/L 4.1   Chloride 98 - 107 mmol/L 103   Carbon Dioxide 22 - 31 mmol/L 28   Urea Nitrogen 8 - 22 mg/dL 14   Creatinine 0.70 - 1.30 mg/dL 1.00   GFR Estimate >60 mL/min/1.73m2 84   Calcium 8.5 - 10.5 mg/dL 9.7   Anion Gap 5 - 18 mmol/L 9   Albumin 3.5 - 5.0 g/dL 4.3   Protein Total 6.0 - 8.0 g/dL 7.9   Alkaline Phosphatase 45 - 120 U/L 59   ALT 0 - 45 U/L 17   AST 0 - 40 U/L 22   Bilirubin Total 0.0 - 1.0 mg/dL 0.9   Cholesterol <=199 mg/dL 142   Patient Fasting > 8hrs?  No   HDL Cholesterol >=40 mg/dL 48   LDL Cholesterol Calculated <=129 mg/dL 79   Triglycerides <=149 mg/dL 75   Glucose 70 - 125 mg/dL 89         Assessment      Humberto Pino is a 67 year old male seen as a PAC referral for risk assessment and optimization for anesthesia.    Plan/Recommendations  Pt will be optimized for the proposed procedure.  See below for details on the assessment, risk, and preoperative recommendations    NEUROLOGY  - No history of TIA, CVA or seizure  -Post Op delirium risk factors:  No risk identified    ENT  - No current airway concerns.  Will need to be reassessed day of surgery.  Mallampati: I  TM: > 3   ~ Sinusitis/inverted squamous papilloma - procedure as above  ~ Tinnitus     CARDIAC  - No history of CAD and Afib  - The patient's blood pressure is elevated today. He reports he checks it at home and is 130/85. He will continue to monitor and if continues to be elevated will reach out to his PCP.   -  "METS (Metabolic Equivalents)  Patient performs 4 or more METS exercise without symptoms            Total Score: 0      RCRI-Very low risk: Class 1 0.4% complication rate            Total Score: 0    ~ The patient works out with a  at the gym. He denies any cardiac symptoms.     PULMONARY  - Obstructive Sleep Apnea  ADINA with home CPAP.  The patient's location is at the [facility] so will not bring for DOS.   - Denies asthma or inhaler use  - Tobacco History      History   Smoking Status     Never   Smokeless Tobacco     Never       GI  ~ History of diverticulitis/ polyps - no current issues.   PONV Medium Risk  Total Score: 2           1 AN PONV: Patient is not a current smoker    1 AN PONV: Intended Post Op Opioids        /RENAL  - Baseline Creatinine  1.00    ENDOCRINE    - BMI: Estimated body mass index is 30.76 kg/m  as calculated from the following:    Height as of this encounter: 1.842 m (6' 0.5\").    Weight as of this encounter: 104.3 kg (230 lb).  Obesity (BMI >30)  - No history of Diabetes Mellitus    HEME  VTE Low Risk 0.5%            Total Score: 3    VTE: Greater than 59 yrs old    VTE: Male      - No history of abnormal bleeding or antiplatelet use.    MSK  ~ OA - prior ACL repair, knee arthroscopy, toe surgery, and trigger finger release. The patient reports when he gets up right away due to his ACL repairs does takes some time for strength to be normal.   ~ The patient just injured his right shoulder yesterday. Consideration for careful positioning to minimize discomfort.       Different anesthesia methods/types have been discussed with the patient, but they are aware that the final plan will be decided by the assigned anesthesia provider on the date of service.    The patient is optimized for their procedure. AVS with information on surgery time/arrival time, meds and NPO status given by nursing staff. No further diagnostic testing indicated.      On the day of service:     Prep time: 9 " minutes  Visit time: 15 minutes  Documentation time: 14 minutes  ------------------------------------------  Total time: 38 minutes      Monserrat Hernandez PA-C  Preoperative Assessment Center  [facility]  Clinic and Surgery Center  Phone: [phone]  Fax: [phone]

## 2023-02-13 ENCOUNTER — ANESTHESIA (OUTPATIENT)
Dept: SURGERY | Facility: AMBULATORY SURGERY CENTER | Age: 68
End: 2023-02-13
Payer: COMMERCIAL

## 2023-02-13 ENCOUNTER — HOSPITAL ENCOUNTER (OUTPATIENT)
Facility: AMBULATORY SURGERY CENTER | Age: 68
Discharge: HOME | End: 2023-02-13
Attending: OTOLARYNGOLOGY
Payer: COMMERCIAL

## 2023-02-13 VITALS
BODY MASS INDEX: 29.42 KG/M2 | OXYGEN SATURATION: 93 % | TEMPERATURE: 97.6 F | WEIGHT: 222 LBS | HEIGHT: 73 IN | SYSTOLIC BLOOD PRESSURE: 149 MMHG | HEART RATE: 79 BPM | RESPIRATION RATE: 19 BRPM | DIASTOLIC BLOOD PRESSURE: 80 MMHG

## 2023-02-13 DIAGNOSIS — J34.89 NASAL OBSTRUCTION: ICD-10-CM

## 2023-02-13 DIAGNOSIS — J32.0 CHRONIC MAXILLARY SINUSITIS: ICD-10-CM

## 2023-02-13 DIAGNOSIS — D36.9 INVERTED PAPILLOMA: Primary | ICD-10-CM

## 2023-02-13 PROCEDURE — 88305 TISSUE EXAM BY PATHOLOGIST: CPT | Mod: 26 | Performed by: PATHOLOGY

## 2023-02-13 PROCEDURE — 31256 EXPLORATION MAXILLARY SINUS: CPT | Mod: 51 | Performed by: OTOLARYNGOLOGY

## 2023-02-13 PROCEDURE — 31254 NSL/SINS NDSC W/PRTL ETHMDCT: CPT | Mod: LT | Performed by: OTOLARYNGOLOGY

## 2023-02-13 PROCEDURE — 31256 EXPLORATION MAXILLARY SINUS: CPT | Mod: LT

## 2023-02-13 PROCEDURE — 31299 UNLISTED PX ACCESSORY SINUS: CPT

## 2023-02-13 PROCEDURE — 61782 SCAN PROC CRANIAL EXTRA: CPT | Mod: GC | Performed by: OTOLARYNGOLOGY

## 2023-02-13 PROCEDURE — 88305 TISSUE EXAM BY PATHOLOGIST: CPT | Mod: TC | Performed by: OTOLARYNGOLOGY

## 2023-02-13 PROCEDURE — 31299 UNLISTED PX ACCESSORY SINUS: CPT | Performed by: OTOLARYNGOLOGY

## 2023-02-13 PROCEDURE — 31254 NSL/SINS NDSC W/PRTL ETHMDCT: CPT | Mod: LT

## 2023-02-13 RX ORDER — SODIUM CHLORIDE, SODIUM LACTATE, POTASSIUM CHLORIDE, CALCIUM CHLORIDE 600; 310; 30; 20 MG/100ML; MG/100ML; MG/100ML; MG/100ML
INJECTION, SOLUTION INTRAVENOUS CONTINUOUS
Status: DISCONTINUED | OUTPATIENT
Start: 2023-02-13 | End: 2023-02-14 | Stop reason: HOSPADM

## 2023-02-13 RX ORDER — PROPOFOL 10 MG/ML
INJECTION, EMULSION INTRAVENOUS CONTINUOUS PRN
Status: DISCONTINUED | OUTPATIENT
Start: 2023-02-13 | End: 2023-02-13

## 2023-02-13 RX ORDER — NEOMYCIN POLYMYXIN B SULFATES AND DEXAMETHASONE 3.5; 10000; 1 MG/ML; [USP'U]/ML; MG/ML
1 SUSPENSION/ DROPS OPHTHALMIC 3 TIMES DAILY
Qty: 10 ML | Refills: 0 | Status: SHIPPED | OUTPATIENT
Start: 2023-02-13 | End: 2023-02-22

## 2023-02-13 RX ORDER — LIDOCAINE 40 MG/G
CREAM TOPICAL
Status: DISCONTINUED | OUTPATIENT
Start: 2023-02-13 | End: 2023-02-14 | Stop reason: HOSPADM

## 2023-02-13 RX ORDER — FENTANYL CITRATE 50 UG/ML
25 INJECTION, SOLUTION INTRAMUSCULAR; INTRAVENOUS EVERY 5 MIN PRN
Status: DISCONTINUED | OUTPATIENT
Start: 2023-02-13 | End: 2023-02-14 | Stop reason: HOSPADM

## 2023-02-13 RX ORDER — PROPOFOL 10 MG/ML
INJECTION, EMULSION INTRAVENOUS PRN
Status: DISCONTINUED | OUTPATIENT
Start: 2023-02-13 | End: 2023-02-13

## 2023-02-13 RX ORDER — ONDANSETRON 4 MG/1
4 TABLET, ORALLY DISINTEGRATING ORAL EVERY 30 MIN PRN
Status: DISCONTINUED | OUTPATIENT
Start: 2023-02-13 | End: 2023-02-14 | Stop reason: HOSPADM

## 2023-02-13 RX ORDER — DEXAMETHASONE SODIUM PHOSPHATE 10 MG/ML
10 INJECTION, SOLUTION INTRAMUSCULAR; INTRAVENOUS ONCE
Status: COMPLETED | OUTPATIENT
Start: 2023-02-13 | End: 2023-02-13

## 2023-02-13 RX ORDER — ONDANSETRON 2 MG/ML
4 INJECTION INTRAMUSCULAR; INTRAVENOUS EVERY 30 MIN PRN
Status: DISCONTINUED | OUTPATIENT
Start: 2023-02-13 | End: 2023-02-14 | Stop reason: HOSPADM

## 2023-02-13 RX ORDER — CEFAZOLIN SODIUM 2 G/50ML
2 SOLUTION INTRAVENOUS
Status: COMPLETED | OUTPATIENT
Start: 2023-02-13 | End: 2023-02-13

## 2023-02-13 RX ORDER — OXYCODONE HYDROCHLORIDE 5 MG/1
5 TABLET ORAL EVERY 6 HOURS PRN
Qty: 15 TABLET | Refills: 0 | Status: SHIPPED | OUTPATIENT
Start: 2023-02-13 | End: 2023-02-18

## 2023-02-13 RX ORDER — ECHINACEA PURPUREA EXTRACT 125 MG
2 TABLET ORAL 3 TIMES DAILY
Qty: 104 ML | Refills: 0 | Status: SHIPPED | OUTPATIENT
Start: 2023-02-13 | End: 2024-01-03

## 2023-02-13 RX ORDER — FENTANYL CITRATE 50 UG/ML
INJECTION, SOLUTION INTRAMUSCULAR; INTRAVENOUS PRN
Status: DISCONTINUED | OUTPATIENT
Start: 2023-02-13 | End: 2023-02-13

## 2023-02-13 RX ORDER — LIDOCAINE HYDROCHLORIDE 20 MG/ML
INJECTION, SOLUTION INFILTRATION; PERINEURAL PRN
Status: DISCONTINUED | OUTPATIENT
Start: 2023-02-13 | End: 2023-02-13

## 2023-02-13 RX ORDER — OXYMETAZOLINE HYDROCHLORIDE 0.05 G/100ML
SPRAY NASAL PRN
Status: DISCONTINUED | OUTPATIENT
Start: 2023-02-13 | End: 2023-02-13 | Stop reason: HOSPADM

## 2023-02-13 RX ORDER — ONDANSETRON 8 MG/1
8 TABLET, ORALLY DISINTEGRATING ORAL EVERY 8 HOURS PRN
Qty: 12 TABLET | Refills: 0 | Status: SHIPPED | OUTPATIENT
Start: 2023-02-13 | End: 2023-02-20

## 2023-02-13 RX ORDER — CEPHALEXIN 500 MG/1
500 CAPSULE ORAL 4 TIMES DAILY
Qty: 40 CAPSULE | Refills: 0 | Status: SHIPPED | OUTPATIENT
Start: 2023-02-13 | End: 2023-02-23

## 2023-02-13 RX ORDER — LABETALOL HYDROCHLORIDE 5 MG/ML
10 INJECTION, SOLUTION INTRAVENOUS
Status: DISCONTINUED | OUTPATIENT
Start: 2023-02-13 | End: 2023-02-14 | Stop reason: HOSPADM

## 2023-02-13 RX ORDER — OXYCODONE HYDROCHLORIDE 5 MG/1
5 TABLET ORAL EVERY 4 HOURS PRN
Status: DISCONTINUED | OUTPATIENT
Start: 2023-02-13 | End: 2023-02-14 | Stop reason: HOSPADM

## 2023-02-13 RX ORDER — ACETAMINOPHEN 325 MG/1
975 TABLET ORAL ONCE
Status: COMPLETED | OUTPATIENT
Start: 2023-02-13 | End: 2023-02-13

## 2023-02-13 RX ORDER — LIDOCAINE HYDROCHLORIDE AND EPINEPHRINE 10; 10 MG/ML; UG/ML
INJECTION, SOLUTION INFILTRATION; PERINEURAL PRN
Status: DISCONTINUED | OUTPATIENT
Start: 2023-02-13 | End: 2023-02-13 | Stop reason: HOSPADM

## 2023-02-13 RX ORDER — HYDROMORPHONE HYDROCHLORIDE 1 MG/ML
0.2 INJECTION, SOLUTION INTRAMUSCULAR; INTRAVENOUS; SUBCUTANEOUS EVERY 5 MIN PRN
Status: DISCONTINUED | OUTPATIENT
Start: 2023-02-13 | End: 2023-02-14 | Stop reason: HOSPADM

## 2023-02-13 RX ORDER — FENTANYL CITRATE 50 UG/ML
50 INJECTION, SOLUTION INTRAMUSCULAR; INTRAVENOUS EVERY 5 MIN PRN
Status: DISCONTINUED | OUTPATIENT
Start: 2023-02-13 | End: 2023-02-14 | Stop reason: HOSPADM

## 2023-02-13 RX ORDER — ONDANSETRON 2 MG/ML
INJECTION INTRAMUSCULAR; INTRAVENOUS PRN
Status: DISCONTINUED | OUTPATIENT
Start: 2023-02-13 | End: 2023-02-13

## 2023-02-13 RX ORDER — CEFAZOLIN SODIUM 2 G/50ML
2 SOLUTION INTRAVENOUS SEE ADMIN INSTRUCTIONS
Status: DISCONTINUED | OUTPATIENT
Start: 2023-02-13 | End: 2023-02-14 | Stop reason: HOSPADM

## 2023-02-13 RX ORDER — HYDROMORPHONE HYDROCHLORIDE 1 MG/ML
0.4 INJECTION, SOLUTION INTRAMUSCULAR; INTRAVENOUS; SUBCUTANEOUS EVERY 5 MIN PRN
Status: DISCONTINUED | OUTPATIENT
Start: 2023-02-13 | End: 2023-02-14 | Stop reason: HOSPADM

## 2023-02-13 RX ADMIN — PROPOFOL 150 MCG/KG/MIN: 10 INJECTION, EMULSION INTRAVENOUS at 12:01

## 2023-02-13 RX ADMIN — Medication 100 MCG: at 13:53

## 2023-02-13 RX ADMIN — FENTANYL CITRATE 100 MCG: 50 INJECTION, SOLUTION INTRAMUSCULAR; INTRAVENOUS at 12:01

## 2023-02-13 RX ADMIN — Medication 50 MG: at 12:02

## 2023-02-13 RX ADMIN — Medication 10 MG: at 14:40

## 2023-02-13 RX ADMIN — CEFAZOLIN SODIUM 2 G: 2 SOLUTION INTRAVENOUS at 11:53

## 2023-02-13 RX ADMIN — ACETAMINOPHEN 975 MG: 325 TABLET ORAL at 09:22

## 2023-02-13 RX ADMIN — SODIUM CHLORIDE, SODIUM LACTATE, POTASSIUM CHLORIDE, CALCIUM CHLORIDE: 600; 310; 30; 20 INJECTION, SOLUTION INTRAVENOUS at 09:29

## 2023-02-13 RX ADMIN — ONDANSETRON 4 MG: 2 INJECTION INTRAMUSCULAR; INTRAVENOUS at 15:39

## 2023-02-13 RX ADMIN — Medication 100 MCG: at 13:01

## 2023-02-13 RX ADMIN — Medication 100 MCG: at 12:26

## 2023-02-13 RX ADMIN — Medication 10 MG: at 13:53

## 2023-02-13 RX ADMIN — DEXAMETHASONE SODIUM PHOSPHATE 10 MG: 10 INJECTION, SOLUTION INTRAMUSCULAR; INTRAVENOUS at 12:21

## 2023-02-13 RX ADMIN — Medication 100 MCG: at 13:21

## 2023-02-13 RX ADMIN — PROPOFOL 200 MG: 10 INJECTION, EMULSION INTRAVENOUS at 12:01

## 2023-02-13 RX ADMIN — Medication 0.5 MG: at 14:43

## 2023-02-13 RX ADMIN — OXYCODONE HYDROCHLORIDE 5 MG: 5 TABLET ORAL at 17:25

## 2023-02-13 RX ADMIN — Medication 20 MG: at 12:48

## 2023-02-13 RX ADMIN — LIDOCAINE HYDROCHLORIDE 100 MG: 20 INJECTION, SOLUTION INFILTRATION; PERINEURAL at 12:01

## 2023-02-13 NOTE — DISCHARGE INSTRUCTIONS
Surgical Discharge Instructions  1. Start rinsing both nasal cavities with Jose A-Med Sinus rinse twice daily. This will assist with healing after surgery. If you do not have sinus rinse equipment, you may use ocean nasal saline spray prescribed after surgery; however, sinus rinse is preferred.   2. Take medications as prescribed.    3. You have been prescribed four medications: a narcotic pain medication (oxycodone) to take as needed for pain not controlled with tylenol; a medication for nausea (ondansetron, aka zofran) to use for nausea and/or vomiting; antibiotics, and an eyedrop.  4. Do not drive or operate machinery while taking narcotic pain medication.   5. For bleeding that is bothersome or excessive, spray afrin (oxymetazoline) nasal spray (four sprays into each nostril) and pinch the tip of the nose closed for 10 minutes. If you find you have done this four times in one hour and are still having bothersome bleeding, please call your doctor.  Afrin has been sent home with you at the day of surgery. If there is blood on the tip of the nasal spray bottle, please don't be alarmed, as this is likely because this was used during or after surgery as you were in the recovery area.   6. Please call your doctor for any headache not controlled with pain medication, severe nausea or vomiting, fevers >100.4, changes in mental status, or any other concerning symptoms you may identify.      Chet Cornejo MD    [facility]  Center for Skull Base and Pituitary Surgery  [facility]  Department of Otolaryngology - Head & Neck Surgery      [facility]  Home Care Following Anesthesia  For 24 hours after surgery:  Get plenty of rest.  A responsible adult must stay with you for at least 24 hours after you leave the surgery center.  Do not drive or use heavy equipment.  If you have weakness or tingling, don't drive or use heavy equipment until this  feeling goes away.   Do not drink alcohol.   Avoid strenuous or risky activities.  Ask for help when climbing stairs.  You may feel lightheaded.  IF so, sit for a few minutes before standing.  Have someone help you get up.   If you have nausea (feel sick to your stomach): Drink only clear liquids such as apple juice, ginger ale, broth or 7-Up.  Rest may also help.  Be sure to drink enough fluids.  Move to a regular diet as you feel able.   You may have a slight fever.  Call the doctor if your fever is over 100 F (37.7 C) (taken under the tongue) or lasts longer than 24 hours.  You may have a dry mouth, a sore throat, muscle aches or trouble sleeping. These should go away after 24 hours.  Do not make important or legal decisions.   It is recommended to avoid smoking.               Tips for taking pain medications  To get the best pain relief possible, remember these points:  Take pain medications as directed, before pain becomes severe.  Pain medication can upset your stomach: taking it with food may help.  Constipation is a common side effect of pain medication. Drink plenty of  fluids.  Eat foods high in fiber. Take a stool softener if recommended by your doctor or pharmacist.  Do not drink alcohol, drive or operate machinery while taking pain medications.  Ask about other ways to control pain, such as with heat, ice or relaxation.    Tylenol/Acetaminophen Consumption  To help encourage the safe use of acetaminophen, the makers of TYLENOL  have lowered the maximum daily dose for single-ingredient Extra Strength TYLENOL  (acetaminophen) products sold in the [place] from 8 pills per day (4,000 mg) to 6 pills per day (3,000 mg). The dosing interval has also changed from 2 pills every 4-6 hours to 2 pills every 6 hours.  If you feel your pain relief is insufficient, you may take Tylenol/Acetaminophen in addition to your narcotic pain medication.   Be careful not to exceed 3,000 mg of Tylenol/Acetaminophen in a 24 hour  period from all sources.  If you are taking extra strength Tylenol/acetaminophen (500 mg), the maximum dose is 6 tablets in 24 hours.  If you are taking regular strength acetaminophen (325 mg), the maximum dose is 9 tablets in 24 hours.    Call a doctor for any of the following:  Signs of infection (fever, growing tenderness at the surgery site, a large amount of drainage or bleeding, severe pain, foul-smelling drainage, redness, swelling).  It has been over 8 to 10 hours since surgery and you are still not able to urinate (pass water).  Headache for over 24 hours.  Numbness, tingling or weakness the day after surgery (if you had spinal anesthesia).  Signs of Covid-19 infection (temperature over 100 degrees, shortness of breath, cough, loss of taste/smell, generalized body aches, persistent headache, chills, sore throat, nausea/vomiting/diarrhea)  Your doctor is:       Dr. Chet Cornejo, ENT Otolaryngology: [phone]               Or dial [phone] and ask for the resident on call for:  ENT Otolaryngology  For emergency care, call the:  [facility]:  [phone] (TTY for hearing impaired: [phone])  Tylenol 975 mg given at 920 am.  Next available dose at 320 pm.

## 2023-02-13 NOTE — ANESTHESIA PROCEDURE NOTES
Airway       Patient location during procedure: OR       Procedure Start/Stop Times: 2/13/2023 12:04 PM  Staff -        CRNA: Destini Ross APRN CRNA       Performed By: CRNAIndications and Patient Condition       Indications for airway management: nazario-procedural       Induction type:intravenous       Mask difficulty assessment: 1 - vent by mask    Final Airway Details       Final airway type: endotracheal airway       Successful airway: ETT - single  Endotracheal Airway Details        ETT size (mm): 7.5       Cuffed: yes       Successful intubation technique: direct laryngoscopy       DL Blade Type: MAC 4       Grade View of Cords: 1       Adjucts: stylet (cricoid pressure)       Position: Left       Measured from: gums/teeth       Secured at (cm): 24       Bite block used: None    Post intubation assessment        Placement verified by: capnometry and equal breath sounds        Secured with: silk tape       Ease of procedure: easy       Dentition: Intact and Unchanged    Medication(s) Administered   Medication Administration Time: 2/13/2023 12:04 PM

## 2023-02-13 NOTE — ANESTHESIA POSTPROCEDURE EVALUATION
Patient: Humberto Pino    Procedure: Procedure(s):  LEFT ENDOSCOPIC IMAGE-GUIDED MAXILLARY ANTROSTOMY WITH TISSUE REMOVAL  RIGHT ENDOSCOPIC, IMAGE-GUIDED MEDIAL MAXILLECTOMY VIA DENKER'S APPROACH       Anesthesia Type:  General    Note:  Disposition: Outpatient   Postop Pain Control: Uneventful            Sign Out: Well controlled pain   PONV: No   Neuro/Psych: Uneventful            Sign Out: Acceptable/Baseline neuro status   Airway/Respiratory: Uneventful            Sign Out: Acceptable/Baseline resp. status   CV/Hemodynamics: Uneventful            Sign Out: Acceptable CV status; No obvious hypovolemia; No obvious fluid overload   Other NRE: NONE   DID A NON-ROUTINE EVENT OCCUR? No           Last vitals:  Vitals Value Taken Time   /83 02/13/23 1615   Temp 36.3  C (97.3  F) 02/13/23 1558   Pulse 68 02/13/23 1617   Resp 21 02/13/23 1617   SpO2 98 % 02/13/23 1617   Vitals shown include unvalidated device data.    Electronically Signed By: Jesus Jhaveri MD, MD  February 13, 2023  4:18 PM

## 2023-02-13 NOTE — ANESTHESIA PREPROCEDURE EVALUATION
Anesthesia Pre-Procedure Evaluation    Patient: Humberto Pino   MRN: [id number] : 1955        Procedure : Procedure(s):  LEFT ENDOSCOPIC IMAGE-GUIDED MAXILLARY ANTROSTOMY WITH TISSUE REMOVAL  RIGHT ENDOSCOPIC, IMAGE-GUIDED MEDIAL MAXILLECTOMY VIA DENKER'S APPROACH          Past Medical History:   Diagnosis Date     Arthritis 2016     Chronic maxillary sinusitis      Diverticulosis of colon (without mention of hemorrhage)     repeat      ADINA (obstructive sleep apnea)      Tinnitus       Past Surgical History:   Procedure Laterality Date     ARTHROSCOPY KNEE  2011    Procedure:ARTHROSCOPY KNEE; With debridement of medial and lateral meniscus  ; Surgeon:AMARILIS PETERS; Location:[facility]     HERNIA REPAIR, UMBILICAL       right thumb, index finger, middle finger and ring finger trigger release       TOE SURGERY      2006     [facility] REPAIR CRUCIATE LIGAMENT,KNEE      both knees-ACL      Allergies   Allergen Reactions     Hay Fever & [A.R.M.] Other (See Comments)     Sneezing & stuffy nose      Social History     Tobacco Use     Smoking status: Never     Smokeless tobacco: Never   Substance Use Topics     Alcohol use: Yes     Comment: once/week      Wt Readings from Last 1 Encounters:   23 104.3 kg (230 lb)        Anesthesia Evaluation   Pt has had prior anesthetic. Type: General.    No history of anesthetic complications       ROS/MED HX  ENT/Pulmonary: Comment: Chronic maxillary sinusitis  tinnitus    (+) sleep apnea,     Neurologic:       Cardiovascular:       METS/Exercise Tolerance:     Hematologic:       Musculoskeletal:   (+) arthritis,     GI/Hepatic: Comment: diverticulosis      Renal/Genitourinary:       Endo:     (+) Obesity,     Psychiatric/Substance Use:       Infectious Disease:       Malignancy:       Other:               OUTSIDE LABS:  CBC:   Lab Results   Component Value Date    WBC 5.0 2023    WBC 5.3 10/16/2015    HGB 15.8 2023    HGB 15.3 10/16/2015     HCT 48.9 01/30/2023    HCT 46.9 10/16/2015     01/30/2023     10/16/2015     BMP:   Lab Results   Component Value Date     02/16/2022     10/16/2015    POTASSIUM 4.1 02/16/2022    POTASSIUM 4.3 10/16/2015    CHLORIDE 103 02/16/2022    CHLORIDE 105 10/16/2015    CO2 28 02/16/2022    CO2 27 10/16/2015    BUN 14 02/16/2022    BUN 15 10/16/2015    CR 1.00 02/16/2022    CR 0.87 10/16/2015    GLC 89 02/16/2022    GLC 89 10/16/2015     COAGS: No results found for: PTT, INR, FIBR  POC: No results found for: BGM, HCG, HCGS  HEPATIC:   Lab Results   Component Value Date    ALBUMIN 4.3 02/16/2022    PROTTOTAL 7.9 02/16/2022    ALT 17 02/16/2022    AST 22 02/16/2022    ALKPHOS 59 02/16/2022    BILITOTAL 0.9 02/16/2022     OTHER:   Lab Results   Component Value Date    A1C 5.4 12/16/2013    KATIE 9.7 02/16/2022    TSH 0.90 10/16/2015    T4 1.31 12/16/2013       Anesthesia Plan    ASA Status:  2      Anesthesia Type: General.     - Airway: ETT   Induction: Intravenous.   Maintenance: Balanced.        Consents            Postoperative Care    Pain management: Multi-modal analgesia.   PONV prophylaxis: Ondansetron (or other 5HT-3), Background Propofol Infusion     Comments:                Stephen Monsalve MD

## 2023-02-13 NOTE — ANESTHESIA CARE TRANSFER NOTE
Patient: Humberto Pino    Procedure: Procedure(s):  LEFT ENDOSCOPIC IMAGE-GUIDED MAXILLARY ANTROSTOMY WITH TISSUE REMOVAL  RIGHT ENDOSCOPIC, IMAGE-GUIDED MEDIAL MAXILLECTOMY VIA DENKER'S APPROACH       Diagnosis: Chronic maxillary sinusitis [J32.0]  Nasal obstruction [J34.89]  Diagnosis Additional Information: No value filed.    Anesthesia Type:   General     Note:    Oropharynx: spontaneously breathing and oropharynx clear of all foreign objects  Level of Consciousness: drowsy  Oxygen Supplementation: face mask  Level of Supplemental Oxygen (L/min / FiO2): 6  Independent Airway: airway patency satisfactory and stable  Dentition: dentition unchanged  Vital Signs Stable: post-procedure vital signs reviewed and stable  Report to RN Given: handoff report given  Patient transferred to: PACU  Comments: Resps easy and regular. Report to PACU RN  Handoff Report: Identifed the Patient, Identified the Reponsible Provider, Reviewed the pertinent medical history, Discussed the surgical course, Reviewed Intra-OP anesthesia mangement and issues during anesthesia, Set expectations for post-procedure period and Allowed opportunity for questions and acknowledgement of understanding      Vitals:  Vitals Value Taken Time   /81 02/13/23 1558   Temp 97.3    Pulse 72 02/13/23 1558   Resp 15 02/13/23 1558   SpO2 94 % 02/13/23 1558       Electronically Signed By: MAYNOR HOPKINS CRNA  February 13, 2023  3:58 PM

## 2023-02-13 NOTE — OR NURSING
MD paged for clarification on eye drops- prescription stated to be given via ears. Clarified that intended route is via eyes. This was communicated to patient and spouse.

## 2023-02-14 NOTE — PROGRESS NOTES
[facility]  Return Visit  Encounter date:   February 22, 2023    Chief Complaint:   First post op follow-up     ID: inverted papilloma s/p as below 2/13/22    Interval History:   Humberto Pino is a 67 year old male who presents for follow up s/p as below 2/13/22. At his last visit prior to surgery on 12/7/22 he elected to pursue surgery to address inverted papilloma as well as chronic sinus disease in the left maxillary sinus. I also performed a biopsy at that time which confirmed inverted papilloma. Today he tells me that he has been doing well since surgery and not needed any pain medication. He had some tearing of his right eye the first day after surgery but has had none since then and has continued using eye drops. He has some numbness along his upper lip and in his tooth. His sense of taste has improved but his sense of smell is still diminished. In the mornings he has noticed some drops of bleeding from his right nostril but nothing significant. He has continued use of saline spray and Neti pot.     Sino-Nasal Outcome Test (SNOT - 22)  DNT       [facility]  Procedure Date: 02/13/2023     PREOPERATIVE DIAGNOSES:     1.  Left chronic maxillary and ethmoid sinusitis.  2.  Right maxillary sinus inverted papilloma.  3.  Chronic nasal congestion and eustachian tube dysfunction secondary to above.     POSTOPERATIVE DIAGNOSES:     1.  Left chronic maxillary and ethmoid sinusitis.  2.  Right maxillary sinus inverted papilloma.  3.  Chronic nasal congestion and eustachian tube dysfunction secondary to above.     PROCEDURE PERFORMED:    1.  Left endoscopic maxillary antrostomy.  2.  Left endoscopic partial ethmoidectomy.  3.  Right endoscopic modified medial maxillectomy via endoscopic Denker's approach for resection of the maxillary sinus tumor, likely inverted papilloma - modifier 22.  4.  Computerized image guidance, extradural.    Review of systems: A 14-point review of systems has  been conducted and is negative for any notable symptoms, except as dictated in the history of present illness.     Physical Exam:  Vital signs: BP (!) 141/78 (BP Location: Left arm, Patient Position: Sitting, Cuff Size: Adult Large)   Pulse 68   Temp 98.6  F (37  C)   Ht 1.829 m (6')   Wt 100.2 kg (221 lb)   SpO2 100%   BMI 29.97 kg/m     General Appearance: No acute distress, appropriate demeanor, conversant  Eyes: moist conjunctivae; EOMI; pupils symmetric; visual acuity grossly intact; no proptosis  Head: normocephalic; overall symmetric appearance without deformity  Face: overall symmetric without deformity; HB I/VI  Nose: No external deformity; see endoscopy  Lungs: symmetric chest rise; no wheezing  CV: Good distal perfusion; normal hear rate  Extremities: No deformity  Neurologic Exam: Cranial nerves II-XII are grossly intact; no focal deficit     Procedure Note  Procedure performed: Rigid nasal endoscopy  Indication: To evaluate for sinonasal pathology not visualized on routine anterior rhinoscopy  Anesthesia: 4% topical lidocaine with 0.05 % oxymetazoline  Description of procedure: A 30 degree, 3 mm rigid endoscope was inserted into bilateral nasal cavities and the nasal valves, nasal cavity, middle meatus, sphenoethmoid recess, nasopharynx were evaluated for evidence of obstruction, edema, purulence, polyps and/or mass/lesion.    I then used a combination of non-cutting forceps, straight and curved suction to clear the bilateral surgical cavities of packing, clot, crust, and fibrinopurulent debris.      Findings:   Extensive post surgical changes of the right maxillary sinus; no signs of acute sinusitis; early scar tissue adhesion to the high right nasal cavity     The patient tolerated the procedure well without complication.     Laboratory Review:  N/A     Imaging Review:  CT facial bones 1/30/23  IMPRESSION:  1. Not substantially changed presumed right inverted papilloma with extension into the  right nasal cavity and nasopharynx compared to 11/28/2022.  2. Not substantially changed partial opacification and mucosal thickening of the left maxillary sinus.    Pathology Review:  Surgical pathology 2/13/23   Final Diagnosis  A. MAXILLARY SINUS, RIGHT SINONASAL TUMOR, EXCISION:  -Inverted sinonasal (Schneiderian) papilloma with acute inflammation and reactive changes, negative for high-grade  dysplasia or malignancy.     Biopsy pathology 12/7/23  Final Diagnosis  A. 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     Continued and routine endoscopy with debridement is indicated post-operatively to defend against post-operative surgical site infection, untoward healing, and monitor for early tumor recurrence.       Plan:  Continue eye drops for another week   Continue sinus rinses   RTC in 2 weeks     Chet Cornejo MD    [facility]  Rhinology, Endoscopic Skull Base Surgery  [facility]  Department of Otolaryngology - Head & Neck Surgery    Scribe Disclosure:  I, Hawa Josefina, am serving as a scribe to document services personally performed by Chet Cornejo MD at this visit, based upon the provider's statements to me. All documentation has been reviewed by the aforementioned provider prior to being entered into the official medical record.     Additional portions of the patient's history have been reviewed below.   ~~~~~~~~~~~~~~~~~~~~~~~~~~~~~~~~~~~~~~~~~~~~~~~~~~~~~~~~~~~~~~~~~~~~~~~~~~~~~~~~~~~~~~~~~~~~~~~~~~~~~~~~~~~~~~~~~~~~~~~~~~~~~~~~~~~~~~~    Past  Medical History:   Diagnosis Date     Arthritis 01/01/2016     Chronic maxillary sinusitis      Diverticulosis of colon (without mention of hemorrhage)     repeat 2012     ADINA (obstructive sleep apnea)      Tinnitus         Past Surgical History:   Procedure Laterality Date     ARTHROSCOPY KNEE  08/04/2011    Procedure:ARTHROSCOPY KNEE; With debridement of medial and lateral meniscus  ; Surgeon:AMARILIS PETERS; Location:[facility]     ENDOSCOPIC ENDONASAL SURGERY Left 2/13/2023    Procedure: LEFT ENDOSCOPIC IMAGE-GUIDED MAXILLARY ANTROSTOMY WITH TISSUE REMOVAL;  Surgeon: Chet Cornejo MD;  [place] [facility] [place]     HERNIA REPAIR, UMBILICAL       OPTICAL TRACKING SYSTEM ENDOSCOPIC SINUS SURGERY Right 2/13/2023    Procedure: RIGHT ENDOSCOPIC, IMAGE-GUIDED MEDIAL MAXILLECTOMY VIA DENKER'S APPROACH;  Surgeon: Chet Cornejo MD;  [place] [facility] [place]     right thumb, index finger, middle finger and ring finger trigger release  2021     TOE SURGERY      feb 2006     ZZC REPAIR CRUCIATE LIGAMENT,KNEE      both knees-ACL        Family History   Problem Relation Age of Onset     Arthritis Mother         osteoporosis     Respiratory Mother         on oxygen-was smoker, COPD     Osteoporosis Mother         significant problem     Cerebrovascular Disease Father         Age 85-87     C.A.D. Father         Rapid heart rates     Other - See Comments Father 62        petuitary tumor     Cerebrovascular Disease Paternal Grandmother         Age about 80     Arthritis Paternal Grandmother      Cerebrovascular Disease Paternal Grandfather         age 94     Anesthesia Reaction No family hx of      Venous thrombosis No family hx of         Social History     Socioeconomic History     Marital status:      Spouse name: Manasa     Number of children: 2     Years of education: 23     Highest education level: None   Occupational History     Occupation: teacher     Employer: [employer]   Tobacco Use     Smoking status:  Never     Smokeless tobacco: Never   Substance and Sexual Activity     Alcohol use: Yes     Comment: once/week     Drug use: No     Sexual activity: Yes     Partners: Female     Birth control/protection: None     Comment: none needed   Other Topics Concern     Parent/sibling w/ CABG, MI or angioplasty before 65F 55M? No   Social History Narrative    Dairy/d 2-3 servings/d.     Caffeine 0-3 servings/d    Exercise 3-4 x week    Sunscreen used - Yes    Seatbelts used - Yes    Working smoke/CO detectors in the home - Yes    Guns stored in the home - No    Self Breast Exams - NOT APPLICABLE    Self Testicular Exam - Yes    Eye Exam up to date - Yes    Dental Exam up to date - Yes    Pap Smear up to date - NOT APPLICABLE    Mammogram up to date - NOT APPLICABLE    PSA up to date - NOT APPLICABLE    Dexa Scan up to date - NOT APPLICABLE    Flex Sig / Colonoscopy up to date - Yes    Immunizations up to date - Yes    Abuse: Current or Past(Physical, Sexual or Emotional)- No    Do you feel safe in your environment - Yes        07/2010

## 2023-02-14 NOTE — OP NOTE
Procedure Date: 02/13/2023    PREOPERATIVE DIAGNOSES:     1.  Left chronic maxillary and ethmoid sinusitis.  2.  Right maxillary sinus inverted papilloma.  3.  Chronic nasal congestion and eustachian tube dysfunction secondary to above.    POSTOPERATIVE DIAGNOSES:     1.  Left chronic maxillary and ethmoid sinusitis.  2.  Right maxillary sinus inverted papilloma.  3.  Chronic nasal congestion and eustachian tube dysfunction secondary to above.    PROCEDURE PERFORMED:    1.  Left endoscopic maxillary antrostomy.  2.  Left endoscopic partial ethmoidectomy.  3.  Right endoscopic modified medial maxillectomy via endoscopic Denker's approach for resection of the maxillary sinus tumor, likely inverted papilloma - modifier 22.  4.  Computerized image guidance, extradural.    INDICATIONS FOR PROCEDURE:  Humberto Pino is a 67-year-old gentleman who was initially seen in the ENT clinic for symptoms of chronic eustachian tube dysfunction as well as nasal congestion.  Upon nasal endoscopy and CT imaging, he was found to have a chronic sinusitis in the left maxillary sinus as well as sinonasal mass emanating from the right maxillary sinus.  I subsequently saw him in the office and biopsy of this lesion, which was confirmed to show inverted papilloma.  He was a candidate for surgery as listed above.  We discussed all the relevant risks, benefits and alternatives of surgery at length including, but not limited to:  Pain, bleeding, scarring, nasolacrimal duct stenosis, facial hypesthesia, residual tumor, need for additional surgery, malignant transformation, change in final pathologic diagnosis, among other risks.  He was allowed to ask a number of insightful questions, which I did answer to the best of my ability and after this discussion, written informed consent was obtained.    DESCRIPTION OF PROCEDURE IN DETAIL:  The patient was identified in the preoperative holding area.  Consent was confirmed.  He was subsequently brought  back to the operating room where general anesthesia was induced and he was intubated without issue.  The eyes were protected.  Timeout was performed.  All were in agreement.  He was subsequently turned 180 degrees and prepped and draped in standard fashion in preparation for endoscopic sinonasal surgery.  We performed a contour-based image guidance registration using [facility] ([facility]) image guidance registration system.  Several landmarks across the facial skeleton were used to confirm good target registration with minimal error.  This was used throughout the case to confirm important landmarks, including the orbit as well as the course of the infraorbital canal as well as a dissection in the right maxillary sinus to remove the entirety of the tumor without issue as well as undue injury to the orbital floor or the infraorbital nerve.    We started the procedure on the left side by decongesting the left nasal cavity with 1:100,000 epinephrine-soaked pledgets.  We medialized the middle turbinate using a [place] elevator.  We performed a maxillary antrostomy using a pediatric backbiter, straight through-cutting forceps as well as microdebrider.  There was hypertrophic mucosa of the left maxillary sinus, which was suctioned and washed away using an irrigation. There was an edematous mucosa, which tracked all the way up to the anterior ethmoid cavity and so the bulla ethmoidalis was removed using a combination of angled through-cutting forceps as well as microdebrider.  Once we were happy with our dissection on the left side, meticulous hemostasis was ensured and we placed a NasoPore sponge in the middle meatus.  We then turned our attention to the right side.  The nasal cavity decongested with 1:100,000 Afrin-soaked pledgets.  We injected the inferior turbinate as well as the middle turbinate and lateral nasal wall with 1% lidocaine with 1:100,000 epinephrine.  There was tumor emanating all the way from the maxillary sinus  posteriorly into the nasopharynx and also into the oropharynx.  It was initially debulked using a straight microdebrider starting inferiorly and posteriorly towards the nasopharynx.  Tumor was debulked using a straight shaver.  These were both laterally and superiorly towards the middle meatus and from where it emanated the maxillary sinus.  Once we had debulked tumor into the maxillary sinus in its entirety, we opened the maxillary sinus from its natural opening using a straight through-cutting forceps as well as backbiter and microdebrider.  Because we used an additional access and remove this tumor we performed an inferior turbinate and medial wall maxillary sinus were removed using a combination of curved and straight Thru-Cutting forceps.  The medial maxillary sinus wall was removed using downbiting punch as well as backbiting forceps as well as a high-speed drill.  We debulked the tumor again additionally and we identified the tumor attachment site along hyperostotic bone that was coursing along the infraorbital foramen.  Given we needed additional access to this tumor to perform complete tumor removal, we decided to  perform a modified endoscopic Denker's approach.  An incision was made using needle tip Bovie electrocautery along the piriform aperture and the mucosa was elevated along the lateral nasal wall using a caudal elevator as well as a suction [place] elevator.  It was then opened towards the nasolacrimal duct as it entered into the inferior meatus.  The mucosa was elevated superior to this as well.  We then elevated this premaxillary soft tissue off the anterior maxillary sinus wall using a combination of straight suction as well as caudal elevator.  It was raised laterally and superiorly towards the infraorbital foramen as it exited to the maxillary sinus.  The intervening medial buttress was then drilled to connect the medial maxillary sinus opening into the anterior maxillotomy with a high-speed  drill.  This was opened widely into this so that we had wide access to better remove this tumor.  Tumor was then removed from the anterior maxillary sinus wall using curved suction microdebrider as well as caudal and suction [place] elevator.  There was again hyperostotic bone along the infraorbital nerve canal.  This was drilled using a high-speed drill.  There was bleeding encountered.  It was probably from vascular branches from the infraorbital nerve canal.  This was controlled using suction Bovie electrocautery.  We were careful not to enter the bony canal of the infraorbital nerve.  The tumor was then removed from the superior and anterior maxilla and lateral maxillary sinus wall just lateral to the infraorbital nerve.  Once we had removed the tumor in its entirety and stripped it free from the maxillary sinus wall it was shaved using a microdebrider.  The tumor attachment site was drilled using a high-speed drill.  The maxillary sinus wall was suction bovied using suction Bovie electrocautery.  Once we were satisfied with our gross total tumor removal, meticulous hemostasis was ensured.  We placed a NasoPore sponge into the maxillary sinus cavity.  An orogastric tube was placed in the stomach and the contents were suctioned.  The patient was subsequently turned over to Anesthesia for awakening.  He was extubated in uneventful fashion and transferred to PACU in stable condition.  I did speak with the wife and discussed with her the outcome of surgery.     MODIFIER 22 STATEMENT:  This patient who had extensive tumor formation that was growing within the maxillary sinus into the nasal cavity.  He also had tumor, which was growing along the infraorbital nerve canal and it was filling the space lateral to the infraorbital nerve canal.  This was extremely difficult to remove in its entirety and required additional maneuvers including an endoscopic modified Denker approach to remove it without issue and to able to  achieve a gross total tumor resection.  This was completed successfully and safely, but did require approximately an extra 30% extra effort to complete the case.    Chet Cornejo MD        D: 2023   T: 2023   MT: CARMEN    Name:     ASHLEY JACOBS  MRN:      [id number]        Account:        [id number]   :      1955           Procedure Date: 2023     Document: [id number]

## 2023-02-14 NOTE — OR NURSING
Administered Afrin nasal spray to both nostrils prior to leaving. Pt. Met criteria at 1730, left room at 1750, but was not picked up by ride at front entrance until 1810.

## 2023-02-22 ENCOUNTER — OFFICE VISIT (OUTPATIENT)
Dept: OTOLARYNGOLOGY | Facility: CLINIC | Age: 68
End: 2023-02-22
Payer: COMMERCIAL

## 2023-02-22 VITALS
WEIGHT: 221 LBS | OXYGEN SATURATION: 100 % | HEIGHT: 72 IN | SYSTOLIC BLOOD PRESSURE: 141 MMHG | HEART RATE: 68 BPM | TEMPERATURE: 98.6 F | DIASTOLIC BLOOD PRESSURE: 78 MMHG | BODY MASS INDEX: 29.93 KG/M2

## 2023-02-22 DIAGNOSIS — J32.0 CHRONIC MAXILLARY SINUSITIS: ICD-10-CM

## 2023-02-22 DIAGNOSIS — D36.9 INVERTED PAPILLOMA: Primary | ICD-10-CM

## 2023-02-22 PROCEDURE — 31237 NSL/SINS NDSC SURG BX POLYPC: CPT | Mod: 50 | Performed by: OTOLARYNGOLOGY

## 2023-02-22 RX ORDER — NEOMYCIN POLYMYXIN B SULFATES AND DEXAMETHASONE 3.5; 10000; 1 MG/ML; [USP'U]/ML; MG/ML
1 SUSPENSION/ DROPS OPHTHALMIC 3 TIMES DAILY
Qty: 10 ML | Refills: 0 | Status: SHIPPED | OUTPATIENT
Start: 2023-02-22 | End: 2023-08-29

## 2023-02-22 ASSESSMENT — ACTIVITIES OF DAILY LIVING (ADL): CURRENT_FUNCTION: NO ASSISTANCE NEEDED

## 2023-02-22 ASSESSMENT — ENCOUNTER SYMPTOMS
JOINT SWELLING: 0
HEARTBURN: 0
PALPITATIONS: 0
WEAKNESS: 0
DIZZINESS: 0
CHILLS: 0
FREQUENCY: 1
NAUSEA: 0
HEMATOCHEZIA: 0
SORE THROAT: 0
PARESTHESIAS: 0
DIARRHEA: 0
ABDOMINAL PAIN: 0
HEADACHES: 0
EYE PAIN: 0
COUGH: 0
SHORTNESS OF BREATH: 0
CONSTIPATION: 0
MYALGIAS: 0
NERVOUS/ANXIOUS: 0
HEMATURIA: 0
ARTHRALGIAS: 0
DYSURIA: 0
FEVER: 0

## 2023-02-22 ASSESSMENT — PAIN SCALES - GENERAL: PAINLEVEL: NO PAIN (0)

## 2023-02-22 NOTE — LETTER
2/22/2023       RE: Humberto Pino  [address]  [place] [address]     Dear Colleague,    Thank you for referring your patient, Humberto Pino, to the [facility] E[facility] NO[facility] AND [facility]ROAT CLINIC [place] at [facility]. Please see a copy of my visit note below.      [facility]  Return Visit  Encounter date:   February 22, 2023    Chief Complaint:   First post op follow-up     ID: inverted papilloma s/p as below 2/13/22    Interval History:   Humberto Pino is a 67 year old male who presents for follow up s/p as below 2/13/22. At his last visit prior to surgery on 12/7/22 he elected to pursue surgery to address inverted papilloma as well as chronic sinus disease in the left maxillary sinus. I also performed a biopsy at that time which confirmed inverted papilloma. Today he tells me that he has been doing well since surgery and not needed any pain medication. He had some tearing of his right eye the first day after surgery but has had none since then and has continued using eye drops. He has some numbness along his upper lip and in his tooth. His sense of taste has improved but his sense of smell is still diminished. In the mornings he has noticed some drops of bleeding from his right nostril but nothing significant. He has continued use of saline spray and Neti pot.     Sino-Nasal Outcome Test (SNOT - 22)  [facility] Operative History  Procedure Date: 02/13/2023     PREOPERATIVE DIAGNOSES:     1.  Left chronic maxillary and ethmoid sinusitis.  2.  Right maxillary sinus inverted papilloma.  3.  Chronic nasal congestion and eustachian tube dysfunction secondary to above.     POSTOPERATIVE DIAGNOSES:     1.  Left chronic maxillary and ethmoid sinusitis.  2.  Right maxillary sinus inverted papilloma.  3.  Chronic nasal congestion and eustachian tube dysfunction secondary to above.     PROCEDURE PERFORMED:    1.  Left endoscopic maxillary  antrostomy.  2.  Left endoscopic partial ethmoidectomy.  3.  Right endoscopic modified medial maxillectomy via endoscopic Denker's approach for resection of the maxillary sinus tumor, likely inverted papilloma - modifier 22.  4.  Computerized image guidance, extradural.    Review of systems: A 14-point review of systems has been conducted and is negative for any notable symptoms, except as dictated in the history of present illness.     Physical Exam:  Vital signs: BP (!) 141/78 (BP Location: Left arm, Patient Position: Sitting, Cuff Size: Adult Large)   Pulse 68   Temp 98.6  F (37  C)   Ht 1.829 m (6')   Wt 100.2 kg (221 lb)   SpO2 100%   BMI 29.97 kg/m     General Appearance: No acute distress, appropriate demeanor, conversant  Eyes: moist conjunctivae; EOMI; pupils symmetric; visual acuity grossly intact; no proptosis  Head: normocephalic; overall symmetric appearance without deformity  Face: overall symmetric without deformity; HB I/VI  Nose: No external deformity; see endoscopy  Lungs: symmetric chest rise; no wheezing  CV: Good distal perfusion; normal hear rate  Extremities: No deformity  Neurologic Exam: Cranial nerves II-XII are grossly intact; no focal deficit     Procedure Note  Procedure performed: Rigid nasal endoscopy  Indication: To evaluate for sinonasal pathology not visualized on routine anterior rhinoscopy  Anesthesia: 4% topical lidocaine with 0.05 % oxymetazoline  Description of procedure: A 30 degree, 3 mm rigid endoscope was inserted into bilateral nasal cavities and the nasal valves, nasal cavity, middle meatus, sphenoethmoid recess, nasopharynx were evaluated for evidence of obstruction, edema, purulence, polyps and/or mass/lesion.    I then used a combination of non-cutting forceps, straight and curved suction to clear the bilateral surgical cavities of packing, clot, crust, and fibrinopurulent debris.      Findings:   Extensive post surgical changes of the right maxillary sinus; no  signs of acute sinusitis; early scar tissue adhesion to the high right nasal cavity     The patient tolerated the procedure well without complication.     Laboratory Review:  N/A     Imaging Review:  CT facial bones 1/30/23  IMPRESSION:  1. Not substantially changed presumed right inverted papilloma with extension into the right nasal cavity and nasopharynx compared to 11/28/2022.  2. Not substantially changed partial opacification and mucosal thickening of the left maxillary sinus.    Pathology Review:  Surgical pathology 2/13/23   Final Diagnosis  A. MAXILLARY SINUS, RIGHT SINONASAL TUMOR, EXCISION:  -Inverted sinonasal (Schneiderian) papilloma with acute inflammation and reactive changes, negative for high-grade  dysplasia or malignancy.     Biopsy pathology 12/7/23  Final Diagnosis  A. 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     Continued and routine endoscopy with debridement is indicated post-operatively to defend against post-operative surgical site infection, untoward healing, and monitor for early tumor recurrence.       Plan:  Continue eye drops for another week   Continue sinus rinses   RTC in 2 weeks     Chet Cornejo MD    [facility]  Rhinology, Endoscopic Skull Base Surgery  [facility]  Department of Otolaryngology - Head & Neck Surgery    Scribe Disclosure:  Hawa ANDERSON, am serving as a scribe to document services personally performed by Chet Cornejo MD at this visit, based upon the provider's statements to me. All documentation has  been reviewed by the aforementioned provider prior to being entered into the official medical record.     Additional portions of the patient's history have been reviewed below.   ~~~~~~~~~~~~~~~~~~~~~~~~~~~~~~~~~~~~~~~~~~~~~~~~~~~~~~~~~~~~~~~~~~~~~~~~~~~~~~~~~~~~~~~~~~~~~~~~~~~~~~~~~~~~~~~~~~~~~~~~~~~~~~~~~~~~~~~    Past Medical History:   Diagnosis Date     Arthritis 01/01/2016     Chronic maxillary sinusitis      Diverticulosis of colon (without mention of hemorrhage)     repeat 2012     ADINA (obstructive sleep apnea)      Tinnitus         Past Surgical History:   Procedure Laterality Date     ARTHROSCOPY KNEE  08/04/2011    Procedure:ARTHROSCOPY KNEE; With debridement of medial and lateral meniscus  ; Surgeon:AMARILIS PETERS; Location: OR     ENDOSCOPIC ENDONASAL SURGERY Left 2/13/2023    Procedure: LEFT ENDOSCOPIC IMAGE-GUIDED MAXILLARY ANTROSTOMY WITH TISSUE REMOVAL;  Surgeon: Chet Cornejo MD;  [place] [facility] [place]     HERNIA REPAIR, UMBILICAL       OPTICAL TRACKING SYSTEM ENDOSCOPIC SINUS SURGERY Right 2/13/2023    Procedure: RIGHT ENDOSCOPIC, IMAGE-GUIDED MEDIAL MAXILLECTOMY VIA DENKER'S APPROACH;  Surgeon: Chet Cornejo MD;  [place] [facility] [place]     right thumb, index finger, middle finger and ring finger trigger release  2021     TOE SURGERY      feb 2006     ZZC REPAIR CRUCIATE LIGAMENT,KNEE      both knees-ACL        Family History   Problem Relation Age of Onset     Arthritis Mother         osteoporosis     Respiratory Mother         on oxygen-was smoker, COPD     Osteoporosis Mother         significant problem     Cerebrovascular Disease Father         Age 85-87     C.A.D. Father         Rapid heart rates     Other - See Comments Father 62        petuitary tumor     Cerebrovascular Disease Paternal Grandmother         Age about 80     Arthritis Paternal Grandmother      Cerebrovascular Disease Paternal Grandfather         age 94     Anesthesia Reaction No family hx of      Venous thrombosis No  family hx of         Social History     Socioeconomic History     Marital status:      Spouse name: Manasa     Number of children: 2     Years of education: 23     Highest education level: None   Occupational History     Occupation: teacher     Employer: [employer]   Tobacco Use     Smoking status: Never     Smokeless tobacco: Never   Substance and Sexual Activity     Alcohol use: Yes     Comment: once/week     Drug use: No     Sexual activity: Yes     Partners: Female     Birth control/protection: None     Comment: none needed   Other Topics Concern     Parent/sibling w/ CABG, MI or angioplasty before 65F 55M? No   Social History Narrative    Dairy/d 2-3 servings/d.     Caffeine 0-3 servings/d    Exercise 3-4 x week    Sunscreen used - Yes    Seatbelts used - Yes    Working smoke/CO detectors in the home - Yes    Guns stored in the home - No    Self Breast Exams - NOT APPLICABLE    Self Testicular Exam - Yes    Eye Exam up to date - Yes    Dental Exam up to date - Yes    Pap Smear up to date - NOT APPLICABLE    Mammogram up to date - NOT APPLICABLE    PSA up to date - NOT APPLICABLE    Dexa Scan up to date - NOT APPLICABLE    Flex Sig / Colonoscopy up to date - Yes    Immunizations up to date - Yes    Abuse: Current or Past(Physical, Sexual or Emotional)- No    Do you feel safe in your environment - Yes        07/2010                       Again, thank you for allowing me to participate in the care of your patient.      Sincerely,    Chet Cornejo MD

## 2023-02-22 NOTE — PATIENT INSTRUCTIONS
You were seen in the ENT Clinic today by Dr. Cornejo. If you have any questions or concerns after your appointment, please contact us (see below)      2.   The following recommendations have been made based upon your appointment today:   -Continue the drops for one more week.   -Sinus rinses twice daily.    3.   Please return to the ENT clinic in 2 weeks.           How to Contact Us:  Send a [employer] message to your provider. Our team will respond to you via [employer]. Occasionally, we will need to call you to get further information.  For urgent matters (Monday-Friday), call the ENT Clinic: [phone] and speak with a call center team member - they will route your call appropriately.   If you'd like to speak directly with a nurse, please find our contact information below. We do our best to check voicemail frequently throughout the day, and will work to call you back within 1-2 days. For urgent matters, please use the general clinic phone numbers listed above.        Lisa CARUSO RN  ENT RN Care Coordinator  Direct: [phone]  Amy GIBSON LPN  Direct: [phone]         [facility]  Department of Otolaryngology

## 2023-02-27 ENCOUNTER — MYC MEDICAL ADVICE (OUTPATIENT)
Dept: SLEEP MEDICINE | Facility: CLINIC | Age: 68
End: 2023-02-27
Payer: COMMERCIAL

## 2023-03-01 ENCOUNTER — OFFICE VISIT (OUTPATIENT)
Dept: INTERNAL MEDICINE | Facility: CLINIC | Age: 68
End: 2023-03-01
Payer: COMMERCIAL

## 2023-03-01 VITALS
WEIGHT: 222 LBS | DIASTOLIC BLOOD PRESSURE: 76 MMHG | SYSTOLIC BLOOD PRESSURE: 125 MMHG | OXYGEN SATURATION: 99 % | TEMPERATURE: 97.7 F | BODY MASS INDEX: 29.42 KG/M2 | HEIGHT: 73 IN | RESPIRATION RATE: 12 BRPM | HEART RATE: 63 BPM

## 2023-03-01 DIAGNOSIS — L65.9 HAIR LOSS: ICD-10-CM

## 2023-03-01 DIAGNOSIS — Z12.5 SCREENING FOR PROSTATE CANCER: ICD-10-CM

## 2023-03-01 DIAGNOSIS — Z00.00 ENCOUNTER FOR MEDICARE ANNUAL WELLNESS EXAM: Primary | ICD-10-CM

## 2023-03-01 LAB
ALBUMIN UR-MCNC: NEGATIVE MG/DL
APPEARANCE UR: CLEAR
BILIRUB UR QL STRIP: NEGATIVE
COLOR UR AUTO: YELLOW
GLUCOSE UR STRIP-MCNC: NEGATIVE MG/DL
HGB UR QL STRIP: NEGATIVE
KETONES UR STRIP-MCNC: NEGATIVE MG/DL
LEUKOCYTE ESTERASE UR QL STRIP: NEGATIVE
NITRATE UR QL: NEGATIVE
PH UR STRIP: 6 [PH] (ref 5–8)
PSA SERPL-MCNC: 2.38 NG/ML (ref 0–4.5)
SP GR UR STRIP: 1.01 (ref 1–1.03)
TSH SERPL DL<=0.005 MIU/L-ACNC: 0.81 UIU/ML (ref 0.3–4.2)
UROBILINOGEN UR STRIP-ACNC: 0.2 E.U./DL

## 2023-03-01 PROCEDURE — 81003 URINALYSIS AUTO W/O SCOPE: CPT | Performed by: INTERNAL MEDICINE

## 2023-03-01 PROCEDURE — G0438 PPPS, INITIAL VISIT: HCPCS | Performed by: INTERNAL MEDICINE

## 2023-03-01 PROCEDURE — 90677 PCV20 VACCINE IM: CPT | Performed by: INTERNAL MEDICINE

## 2023-03-01 PROCEDURE — 84443 ASSAY THYROID STIM HORMONE: CPT | Performed by: INTERNAL MEDICINE

## 2023-03-01 PROCEDURE — G0103 PSA SCREENING: HCPCS | Performed by: INTERNAL MEDICINE

## 2023-03-01 PROCEDURE — 36415 COLL VENOUS BLD VENIPUNCTURE: CPT | Performed by: INTERNAL MEDICINE

## 2023-03-01 PROCEDURE — G0009 ADMIN PNEUMOCOCCAL VACCINE: HCPCS | Performed by: INTERNAL MEDICINE

## 2023-03-01 PROCEDURE — 99213 OFFICE O/P EST LOW 20 MIN: CPT | Mod: 25 | Performed by: INTERNAL MEDICINE

## 2023-03-01 ASSESSMENT — ENCOUNTER SYMPTOMS
DIZZINESS: 0
ABDOMINAL PAIN: 0
SHORTNESS OF BREATH: 0
CONSTIPATION: 0
DYSURIA: 0
PARESTHESIAS: 0
FREQUENCY: 1
JOINT SWELLING: 0
MYALGIAS: 0
NERVOUS/ANXIOUS: 0
SORE THROAT: 0
COUGH: 0
HEMATURIA: 0
EYE PAIN: 0
PALPITATIONS: 0
HEMATOCHEZIA: 0
CHILLS: 0
FEVER: 0
DIARRHEA: 0
WEAKNESS: 0
ARTHRALGIAS: 0
HEARTBURN: 0
HEADACHES: 0
NAUSEA: 0

## 2023-03-01 ASSESSMENT — ACTIVITIES OF DAILY LIVING (ADL): CURRENT_FUNCTION: NO ASSISTANCE NEEDED

## 2023-03-01 NOTE — PROGRESS NOTES
"SUBJECTIVE:   Christian is a 67 year old who presents for Preventive Visit.    Patient has been advised of split billing requirements and indicates understanding: Yes  Are you in the first 12 months of your Medicare coverage?  No    Healthy Habits:     In general, how would you rate your overall health?  Good    Frequency of exercise:  4-5 days/week    Duration of exercise:  30-45 minutes    Do you usually eat at least 4 servings of fruit and vegetables a day, include whole grains    & fiber and avoid regularly eating high fat or \"junk\" foods?  Yes    Taking medications regularly:  Yes    Medication side effects:  Not applicable    Ability to successfully perform activities of daily living:  No assistance needed    Home Safety:  No safety concerns identified    Hearing Impairment:  No hearing concerns    In the past 6 months, have you been bothered by leaking of urine?  No    In general, how would you rate your overall mental or emotional health?  Excellent      PHQ-2 Total Score: 0    Additional concerns today:  Yes      Have you ever done Advance Care Planning? (For example, a Health Directive, POLST, or a discussion with a medical provider or your loved ones about your wishes): Yes, patient states has an Advance Care Planning document and will bring a copy to the clinic.       Fall risk  Fallen 2 or more times in the past year?: No  Any fall with injury in the past year?: No    Cognitive Screening Patient has no identifiable cognitive impairment based on our visit today through our conversation and examination     Do you have sleep apnea, excessive snoring or daytime drowsiness?: yes    Reviewed and updated as needed this visit by clinical staff   Tobacco  Allergies  Meds              Reviewed and updated as needed this visit by Provider                 Social History     Tobacco Use     Smoking status: Never     Smokeless tobacco: Never   Substance Use Topics     Alcohol use: Yes     Comment: once/week "         Alcohol Use 2/22/2023   Prescreen: >3 drinks/day or >7 drinks/week? No               Current providers sharing in care for this patient include:   Patient Care Team:  Rajiv Ludwig MD as PCP - General (Internal Medicine)  Costa Mayer MD as Assigned Sleep Provider  Rajiv Ludwig MD as Assigned PCP  Monserrat Hernandez PA-C as Assigned Surgical Provider    The following health maintenance items are reviewed in Epic and correct as of today:  Health Maintenance   Topic Date Due     AORTIC ANEURYSM SCREENING (SYSTEM ASSIGNED)  Never done     ANNUAL REVIEW OF HM ORDERS  02/16/2023     MEDICARE ANNUAL WELLNESS VISIT  02/16/2023     Pneumococcal Vaccine: 65+ Years (2 - PCV) 02/16/2023     FALL RISK ASSESSMENT  03/01/2024     LIPID  02/16/2027     ADVANCE CARE PLANNING  02/16/2027     COLORECTAL CANCER SCREENING  07/05/2027     DTAP/TDAP/TD IMMUNIZATION (4 - Td or Tdap) 07/07/2027     HEPATITIS C SCREENING  Completed     PHQ-2 (once per calendar year)  Completed     INFLUENZA VACCINE  Completed     ZOSTER IMMUNIZATION  Completed     COVID-19 Vaccine  Completed     IPV IMMUNIZATION  Aged Out     MENINGITIS IMMUNIZATION  Aged Out               Review of Systems   Constitutional: Negative for chills and fever.   HENT: Positive for congestion. Negative for ear pain, hearing loss and sore throat.    Eyes: Negative for pain and visual disturbance.   Respiratory: Negative for cough and shortness of breath.    Cardiovascular: Negative for chest pain, palpitations and peripheral edema.   Gastrointestinal: Negative for abdominal pain, constipation, diarrhea, heartburn, hematochezia and nausea.   Genitourinary: Positive for frequency and impotence. Negative for dysuria, genital sores, hematuria and urgency.   Musculoskeletal: Negative for arthralgias, joint swelling and myalgias.   Skin: Negative for rash.   Neurological: Negative for dizziness, weakness, headaches and paresthesias.  "  Psychiatric/Behavioral: Negative for mood changes. The patient is not nervous/anxious.          OBJECTIVE:   /76 (BP Location: Left arm, Patient Position: Sitting)   Pulse 63   Temp 97.7  F (36.5  C) (Tympanic)   Resp 12   Ht 1.842 m (6' 0.5\")   Wt 100.7 kg (222 lb)   SpO2 99%   BMI 29.69 kg/m   Estimated body mass index is 29.69 kg/m  as calculated from the following:    Height as of this encounter: 1.842 m (6' 0.5\").    Weight as of this encounter: 100.7 kg (222 lb).  Physical Exam  GENERAL: healthy, alert and no distress  EYES: Eyes grossly normal to inspection, PERRL and conjunctivae and sclerae normal  HENT: ear canals and TM's normal, nose and mouth without ulcers or lesions  NECK: no adenopathy, no asymmetry, masses, or scars and thyroid normal to palpation  RESP: lungs clear to auscultation - no rales, rhonchi or wheezes  CV: regular rate and rhythm, normal S1 S2, no S3 or S4, no murmur, click or rub, no peripheral edema and peripheral pulses strong  ABDOMEN: soft, nontender, no hepatosplenomegaly, no masses and bowel sounds normal  MS: no gross musculoskeletal defects noted, no edema  SKIN: no suspicious lesions or rashes  NEURO: Normal strength and tone, mentation intact and speech normal  PSYCH: mentation appears normal, affect normal/bright    Diagnostic Test Results:  Labs reviewed in Epic    ASSESSMENT / PLAN:   (Z00.00) Encounter for Medicare annual wellness exam  (primary encounter diagnosis)  Comment: generally doing well  Plan: UA Macro with Reflex to Micro and Culture - lab        collect        Some polyuria--possibly BPH. UA and psa ordered regardless.    (L65.9) Hair loss  Comment: eyebrows  Plan: TSH with free T4 reflex        Suspect this is normal aging though will check tsh.    (Z12.5) Screening for prostate cancer  Comment: discussed  Plan: PSA, screen        Wishes to proceed with testing. Ordered.      Patient has been advised of split billing requirements and indicates " "understanding: Yes      COUNSELING:  Reviewed preventive health counseling, as reflected in patient instructions      BMI:   Estimated body mass index is 29.69 kg/m  as calculated from the following:    Height as of this encounter: 1.842 m (6' 0.5\").    Weight as of this encounter: 100.7 kg (222 lb).         He reports that he has never smoked. He has never used smokeless tobacco.      Appropriate preventive services were discussed with this patient, including applicable screening as appropriate for cardiovascular disease, diabetes, osteopenia/osteoporosis, and glaucoma.  As appropriate for age/gender, discussed screening for colorectal cancer, prostate cancer, breast cancer, and cervical cancer. Checklist reviewing preventive services available has been given to the patient.    Reviewed patients plan of care and provided an AVS. The Basic Care Plan (routine screening as documented in Health Maintenance) for Humberto meets the Care Plan requirement. This Care Plan has been established and reviewed with the Patient.          Rajiv Ludwig MD  [facility]    Identified Health Risks:  "

## 2023-03-01 NOTE — PATIENT INSTRUCTIONS
Patient Education   Personalized Prevention Plan  You are due for the preventive services outlined below.  Your care team is available to assist you in scheduling these services.  If you have already completed any of these items, please share that information with your care team to update in your medical record.  Health Maintenance Due   Topic Date Due     AORTIC ANEURYSM SCREENING (SYSTEM ASSIGNED)  Never done     ANNUAL REVIEW OF HM ORDERS  02/16/2023     Annual Wellness Visit  02/16/2023     Pneumococcal Vaccine (2 - PCV) 02/16/2023

## 2023-03-08 ENCOUNTER — MYC MEDICAL ADVICE (OUTPATIENT)
Dept: OTOLARYNGOLOGY | Facility: CLINIC | Age: 68
End: 2023-03-08

## 2023-03-21 ENCOUNTER — TELEPHONE (OUTPATIENT)
Dept: OTOLARYNGOLOGY | Facility: CLINIC | Age: 68
End: 2023-03-21
Payer: COMMERCIAL

## 2023-03-21 NOTE — TELEPHONE ENCOUNTER
Called and offered patient follow up appointment with Dr. Cornejo, post op appointment.    Patient unable to do appointment on April 3rd as patient will be on a trip. Patient states he will be back from his trip on April 10th. This writer advised patient that we will work on another follow up date in the near future. Patient is agreeable to this.    Nothing further needed at this time.    HORACIO CURRAN, LPN on 3/21/2023 at 4:22 PM

## 2023-03-22 ENCOUNTER — MYC MEDICAL ADVICE (OUTPATIENT)
Dept: OTOLARYNGOLOGY | Facility: CLINIC | Age: 68
End: 2023-03-22
Payer: COMMERCIAL

## 2023-04-17 ENCOUNTER — OFFICE VISIT (OUTPATIENT)
Dept: OTOLARYNGOLOGY | Facility: CLINIC | Age: 68
End: 2023-04-17
Payer: COMMERCIAL

## 2023-04-17 VITALS
HEIGHT: 72 IN | BODY MASS INDEX: 30.07 KG/M2 | OXYGEN SATURATION: 97 % | WEIGHT: 222 LBS | HEART RATE: 77 BPM | DIASTOLIC BLOOD PRESSURE: 80 MMHG | SYSTOLIC BLOOD PRESSURE: 135 MMHG

## 2023-04-17 DIAGNOSIS — J32.0 CHRONIC MAXILLARY SINUSITIS: Primary | ICD-10-CM

## 2023-04-17 PROCEDURE — 31231 NASAL ENDOSCOPY DX: CPT | Performed by: OTOLARYNGOLOGY

## 2023-04-17 PROCEDURE — 99212 OFFICE O/P EST SF 10 MIN: CPT | Mod: 25 | Performed by: OTOLARYNGOLOGY

## 2023-04-17 ASSESSMENT — PAIN SCALES - GENERAL: PAINLEVEL: NO PAIN (0)

## 2023-04-17 NOTE — PROGRESS NOTES
[facility]  Return Visit  Encounter date:   April 17, 2023    Chief Complaint:   Follow-up     ID: inverted papilloma s/p as below 2/13/22    Interval History:   Humberto Pino is a 67 year old male who presents for follow up. At our last visit on 2/22/23 he was healing well as expected and advised to continue saline rinses. Today he tells me that his nose is doing well and he has continued rinses 2-3 times daily. He is better able to breath out of his nose and has not had significant crusting or drainage expelling from his nose. He still has an area of numbness about 1 cm above his right lip although he is not spilling any foods or liquids. He denies any right eye tearing.     Sino-Nasal Outcome Test (SNOT - 22)  DNT     [place] [facility]  Procedure Date: 02/13/2023     PREOPERATIVE DIAGNOSES:     1.  Left chronic maxillary and ethmoid sinusitis.  2.  Right maxillary sinus inverted papilloma.  3.  Chronic nasal congestion and eustachian tube dysfunction secondary to above.     POSTOPERATIVE DIAGNOSES:     1.  Left chronic maxillary and ethmoid sinusitis.  2.  Right maxillary sinus inverted papilloma.  3.  Chronic nasal congestion and eustachian tube dysfunction secondary to above.     PROCEDURE PERFORMED:    1.  Left endoscopic maxillary antrostomy.  2.  Left endoscopic partial ethmoidectomy.  3.  Right endoscopic modified medial maxillectomy via endoscopic Denker's approach for resection of the maxillary sinus tumor, likely inverted papilloma - modifier 22.  4.  Computerized image guidance, extradural.    Review of systems: A 14-point review of systems has been conducted and is negative for any notable symptoms, except as dictated in the history of present illness.     Physical Exam:  Vital signs: /80 (BP Location: Right arm, Patient Position: Sitting, Cuff Size: Adult Regular)   Pulse 77   Ht 1.829 m (6')   Wt 100.7 kg (222 lb)   SpO2 97%   BMI 30.11 kg/m     General Appearance:  No acute distress, appropriate demeanor, conversant  Eyes: moist conjunctivae; EOMI; pupils symmetric; visual acuity grossly intact; no proptosis  Head: normocephalic; overall symmetric appearance without deformity  Face: overall symmetric without deformity; HB I/VI  Nose: No external deformity; see endoscopy  Lungs: symmetric chest rise; no wheezing  CV: Good distal perfusion; normal hear rate  Extremities: No deformity  Neurologic Exam: Cranial nerves II-XII are grossly intact; no focal deficit       Procedure Note  Procedure performed: Rigid nasal endoscopy  Indication: To evaluate for sinonasal pathology not visualized on routine anterior rhinoscopy  Anesthesia: 4% topical lidocaine with 0.05 % oxymetazoline  Description of procedure: A 30 degree, 3 mm rigid endoscope was inserted into bilateral nasal cavities and the nasal valves, nasal cavity, middle meatus, sphenoethmoid recess, nasopharynx were evaluated for evidence of obstruction, edema, purulence, polyps and/or mass/lesion.     Joey-Nestor Endoscopic Scoring System  Endoscopic observation Right Left   Polyps in middle meatus (0 = absent, 1 = restricted to middle meatus, 2 = Beyond middle meatus) 0 0   Discharge (0 = absent, 1 = thin and clear, 2 = thick, purulent) 0 0   Edema (0 = absent, 1 = mild-moderate, 2 = moderate-severe) 0 0   Crusting (0 = absent, 1 = mild-moderate, 2 = moderate-severe) 0 0   Scarring (0= absent, 1 = mild-moderate, 2 = moderate-severe) 0 0   Total 0 0     Findings  RT: maxillary sinus clear with no signs of infection or residual tumor   LT: maxillary sinus clear with no signs of infection     The patient tolerated the procedure well without complication.     Laboratory Review:  N/A     Imaging Review:  CT facial bones 1/30/23  IMPRESSION:  1. Not substantially changed presumed right inverted papilloma with extension into the right nasal cavity and nasopharynx compared to 11/28/2022.  2. Not substantially changed partial  opacification and mucosal thickening of the left maxillary sinus.     Pathology Review:  Surgical pathology 2/13/23   Final Diagnosis  A. MAXILLARY SINUS, RIGHT SINONASAL TUMOR, EXCISION:  -Inverted sinonasal (Schneiderian) papilloma with acute inflammation and reactive changes, negative for high-grade  dysplasia or malignancy.      Biopsy pathology 12/7/23  Final Diagnosis  A. RIGHT NASAL CAVITY, BIOPSY:  - Fragments of sinonasal (schneiderian) papilloma, inverted type  - No evidence of high grade dysplasia or malignancy    Assessment/Medical Decision Making:  Inverted papilloma of the right maxillary sinus  Underlying chronic sinusitis  Nasal obstruction  Eustachian tube dysfunction  S/p as above       Bilateral endoscopy today - no signs of infection or residual tumor    Continued and routine endoscopy with debridement is indicated post-operatively to defend against post-operative surgical site infection, untoward healing, and monitor for early tumor recurrence.     Plan:  Continue saline rinses as needed  RTC in 3 months     Chet Cornejo MD    [facility]  Rhinology, Endoscopic Skull Base Surgery  [facility]  Department of Otolaryngology - Head & Neck Surgery    Scribe Disclosure:  I, Hawa Rocha, am serving as a scribe to document services personally performed by Chet Cornejo MD at this visit, based upon the provider's statements to me. All documentation has been reviewed by the aforementioned provider prior to being entered into the official medical record.     Additional portions of the patient's history have been reviewed below.   ~~~~~~~~~~~~~~~~~~~~~~~~~~~~~~~~~~~~~~~~~~~~~~~~~~~~~~~~~~~~~~~~~~~~~~~~~~~~~~~~~~~~~~~~~~~~~~~~~~~~~~~~~~~~~~~~~~~~~~~~~~~~~~~~~~~~~~~    Past Medical History:   Diagnosis Date     Arthritis 01/01/2016     Chronic maxillary sinusitis      Diverticulosis of colon (without mention of hemorrhage)     repeat 2012     ADINA  (obstructive sleep apnea)      Tinnitus         Past Surgical History:   Procedure Laterality Date     ARTHROSCOPY KNEE  08/04/2011    Procedure:ARTHROSCOPY KNEE; With debridement of medial and lateral meniscus  ; Surgeon:AMARILIS PETERS; Location:[facility]     ENDOSCOPIC ENDONASAL SURGERY Left 2/13/2023    Procedure: LEFT ENDOSCOPIC IMAGE-GUIDED MAXILLARY ANTROSTOMY WITH TISSUE REMOVAL;  Surgeon: Chet Cornejo MD;  Location: [facility] OR     HERNIA REPAIR, UMBILICAL       OPTICAL TRACKING SYSTEM ENDOSCOPIC SINUS SURGERY Right 2/13/2023    Procedure: RIGHT ENDOSCOPIC, IMAGE-GUIDED MEDIAL MAXILLECTOMY VIA DENKER'S APPROACH;  Surgeon: Chet Cornejo MD;  Location: [facility] OR     right thumb, index finger, middle finger and ring finger trigger release  2021     TOE SURGERY      feb 2006     ZZC REPAIR CRUCIATE LIGAMENT,KNEE      both knees-ACL        Family History   Problem Relation Age of Onset     Arthritis Mother         osteoporosis     Respiratory Mother         on oxygen-was smoker, COPD     Osteoporosis Mother         significant problem     Cerebrovascular Disease Father         Age 85-87     C.A.D. Father         Rapid heart rates     Other - See Comments Father 62        petuitary tumor     Cerebrovascular Disease Paternal Grandmother         Age about 80     Arthritis Paternal Grandmother      Cerebrovascular Disease Paternal Grandfather         age 94     Anesthesia Reaction No family hx of      Venous thrombosis No family hx of         Social History     Socioeconomic History     Marital status:      Spouse name: Manasa     Number of children: 2     Years of education: 23   Occupational History     Occupation: teacher     Employer: [id number]   Tobacco Use     Smoking status: Never     Smokeless tobacco: Never   Vaping Use     Vaping status: Never Used     Passive vaping exposure: Yes   Substance and Sexual Activity     Alcohol use: Yes     Comment: once/week     Drug use: No     Sexual  activity: Yes     Partners: Female     Birth control/protection: None     Comment: none needed   Other Topics Concern     Parent/sibling w/ CABG, MI or angioplasty before 65F 55M? No   Social History Narrative    Dairy/d 2-3 servings/d.     Caffeine 0-3 servings/d    Exercise 3-4 x week    Sunscreen used - Yes    Seatbelts used - Yes    Working smoke/CO detectors in the home - Yes    Guns stored in the home - No    Self Breast Exams - NOT APPLICABLE    Self Testicular Exam - Yes    Eye Exam up to date - Yes    Dental Exam up to date - Yes    Pap Smear up to date - NOT APPLICABLE    Mammogram up to date - NOT APPLICABLE    PSA up to date - NOT APPLICABLE    Dexa Scan up to date - NOT APPLICABLE    Flex Sig / Colonoscopy up to date - Yes    Immunizations up to date - Yes    Abuse: Current or Past(Physical, Sexual or Emotional)- No    Do you feel safe in your environment - Yes        07/2010

## 2023-04-17 NOTE — LETTER
4/17/2023       RE: Humberto Pino  [address]  [place] [address]     Dear Colleague,    Thank you for referring your patient, Humberto Pino, to the [facility] [place] [facility]. Please see a copy of my visit note below.      [facility]  Return Visit  Encounter date:   April 17, 2023    Chief Complaint:   Follow-up     ID: inverted papilloma s/p as below 2/13/22    Interval History:   Humberto Pino is a 67 year old male who presents for follow up. At our last visit on 2/22/23 he was healing well as expected and advised to continue saline rinses. Today he tells me that his nose is doing well and he has continued rinses 2-3 times daily. He is better able to breath out of his nose and has not had significant crusting or drainage expelling from his nose. He still has an area of numbness about 1 cm above his right lip although he is not spilling any foods or liquids. He denies any right eye tearing.     Sino-Nasal Outcome Test (SNOT - 22)  DNT     [place] [facility]  Procedure Date: 02/13/2023     PREOPERATIVE DIAGNOSES:     1.  Left chronic maxillary and ethmoid sinusitis.  2.  Right maxillary sinus inverted papilloma.  3.  Chronic nasal congestion and eustachian tube dysfunction secondary to above.     POSTOPERATIVE DIAGNOSES:     1.  Left chronic maxillary and ethmoid sinusitis.  2.  Right maxillary sinus inverted papilloma.  3.  Chronic nasal congestion and eustachian tube dysfunction secondary to above.     PROCEDURE PERFORMED:    1.  Left endoscopic maxillary antrostomy.  2.  Left endoscopic partial ethmoidectomy.  3.  Right endoscopic modified medial maxillectomy via endoscopic Denker's approach for resection of the maxillary sinus tumor, likely inverted papilloma - modifier 22.  4.  Computerized image guidance, extradural.    Review of systems: A 14-point review of systems has been conducted  and is negative for any notable symptoms, except as dictated in the history of present illness.     Physical Exam:  Vital signs: /80 (BP Location: Right arm, Patient Position: Sitting, Cuff Size: Adult Regular)   Pulse 77   Ht 1.829 m (6')   Wt 100.7 kg (222 lb)   SpO2 97%   BMI 30.11 kg/m     General Appearance: No acute distress, appropriate demeanor, conversant  Eyes: moist conjunctivae; EOMI; pupils symmetric; visual acuity grossly intact; no proptosis  Head: normocephalic; overall symmetric appearance without deformity  Face: overall symmetric without deformity; HB I/VI  Nose: No external deformity; see endoscopy  Lungs: symmetric chest rise; no wheezing  CV: Good distal perfusion; normal hear rate  Extremities: No deformity  Neurologic Exam: Cranial nerves II-XII are grossly intact; no focal deficit       Procedure Note  Procedure performed: Rigid nasal endoscopy  Indication: To evaluate for sinonasal pathology not visualized on routine anterior rhinoscopy  Anesthesia: 4% topical lidocaine with 0.05 % oxymetazoline  Description of procedure: A 30 degree, 3 mm rigid endoscope was inserted into bilateral nasal cavities and the nasal valves, nasal cavity, middle meatus, sphenoethmoid recess, nasopharynx were evaluated for evidence of obstruction, edema, purulence, polyps and/or mass/lesion.     Joey-Nestor Endoscopic Scoring System  Endoscopic observation Right Left   Polyps in middle meatus (0 = absent, 1 = restricted to middle meatus, 2 = Beyond middle meatus) 0 0   Discharge (0 = absent, 1 = thin and clear, 2 = thick, purulent) 0 0   Edema (0 = absent, 1 = mild-moderate, 2 = moderate-severe) 0 0   Crusting (0 = absent, 1 = mild-moderate, 2 = moderate-severe) 0 0   Scarring (0= absent, 1 = mild-moderate, 2 = moderate-severe) 0 0   Total 0 0     Findings  RT: maxillary sinus clear with no signs of infection or residual tumor   LT: maxillary sinus clear with no signs of infection     The patient  tolerated the procedure well without complication.     Laboratory Review:  N/A     Imaging Review:  CT facial bones 1/30/23  IMPRESSION:  1. Not substantially changed presumed right inverted papilloma with extension into the right nasal cavity and nasopharynx compared to 11/28/2022.  2. Not substantially changed partial opacification and mucosal thickening of the left maxillary sinus.     Pathology Review:  Surgical pathology 2/13/23   Final Diagnosis  A. MAXILLARY SINUS, RIGHT SINONASAL TUMOR, EXCISION:  -Inverted sinonasal (Schneiderian) papilloma with acute inflammation and reactive changes, negative for high-grade  dysplasia or malignancy.      Biopsy pathology 12/7/23  Final Diagnosis  A. RIGHT NASAL CAVITY, BIOPSY:  - Fragments of sinonasal (schneiderian) papilloma, inverted type  - No evidence of high grade dysplasia or malignancy    Assessment/Medical Decision Making:  Inverted papilloma of the right maxillary sinus  Underlying chronic sinusitis  Nasal obstruction  Eustachian tube dysfunction  S/p as above       Bilateral endoscopy today - no signs of infection or residual tumor    Continued and routine endoscopy with debridement is indicated post-operatively to defend against post-operative surgical site infection, untoward healing, and monitor for early tumor recurrence.     Plan:  Continue saline rinses as needed  RTC in 3 months     Chet Cornejo MD    [facility]  Rhinology, Endoscopic Skull Base Surgery  [facility]  Department of Otolaryngology - Head & Neck Surgery    Scribe Disclosure:  IHawa, am serving as a scribe to document services personally performed by Chet Cornejo MD at this visit, based upon the provider's statements to me. All documentation has been reviewed by the aforementioned provider prior to being entered into the official medical record.     Additional portions of the patient's history have been reviewed below.    ~~~~~~~~~~~~~~~~~~~~~~~~~~~~~~~~~~~~~~~~~~~~~~~~~~~~~~~~~~~~~~~~~~~~~~~~~~~~~~~~~~~~~~~~~~~~~~~~~~~~~~~~~~~~~~~~~~~~~~~~~~~~~~~~~~~~~~~    Past Medical History:   Diagnosis Date     Arthritis 01/01/2016     Chronic maxillary sinusitis      Diverticulosis of colon (without mention of hemorrhage)     repeat 2012     ADINA (obstructive sleep apnea)      Tinnitus         Past Surgical History:   Procedure Laterality Date     ARTHROSCOPY KNEE  08/04/2011    Procedure:ARTHROSCOPY KNEE; With debridement of medial and lateral meniscus  ; Surgeon:AMARILIS PETERS; Location: OR     ENDOSCOPIC ENDONASAL SURGERY Left 2/13/2023    Procedure: LEFT ENDOSCOPIC IMAGE-GUIDED MAXILLARY ANTROSTOMY WITH TISSUE REMOVAL;  Surgeon: Chet Cornejo MD;  Location: [facility] [place]     HERNIA REPAIR, UMBILICAL       OPTICAL TRACKING SYSTEM ENDOSCOPIC SINUS SURGERY Right 2/13/2023    Procedure: RIGHT ENDOSCOPIC, IMAGE-GUIDED MEDIAL MAXILLECTOMY VIA DENKER'S APPROACH;  Surgeon: Chet Cornejo MD;  Location: [facility] [place]     right thumb, index finger, middle finger and ring finger trigger release  2021     TOE SURGERY      feb 2006     ZZC REPAIR CRUCIATE LIGAMENT,KNEE      both knees-ACL        Family History   Problem Relation Age of Onset     Arthritis Mother         osteoporosis     Respiratory Mother         on oxygen-was smoker, COPD     Osteoporosis Mother         significant problem     Cerebrovascular Disease Father         Age 85-87     C.A.D. Father         Rapid heart rates     Other - See Comments Father 62        petuitary tumor     Cerebrovascular Disease Paternal Grandmother         Age about 80     Arthritis Paternal Grandmother      Cerebrovascular Disease Paternal Grandfather         age 94     Anesthesia Reaction No family hx of      Venous thrombosis No family hx of         Social History     Socioeconomic History     Marital status:      Spouse name: Manasa     Number of children: 2     Years of education: 23    Occupational History     Occupation: teacher     Employer: [employer]   Tobacco Use     Smoking status: Never     Smokeless tobacco: Never   Vaping Use     Vaping status: Never Used     Passive vaping exposure: Yes   Substance and Sexual Activity     Alcohol use: Yes     Comment: once/week     Drug use: No     Sexual activity: Yes     Partners: Female     Birth control/protection: None     Comment: none needed   Other Topics Concern     Parent/sibling w/ CABG, MI or angioplasty before 65F 55M? No   Social History Narrative    Dairy/d 2-3 servings/d.     Caffeine 0-3 servings/d    Exercise 3-4 x week    Sunscreen used - Yes    Seatbelts used - Yes    Working smoke/CO detectors in the home - Yes    Guns stored in the home - No    Self Breast Exams - NOT APPLICABLE    Self Testicular Exam - Yes    Eye Exam up to date - Yes    Dental Exam up to date - Yes    Pap Smear up to date - NOT APPLICABLE    Mammogram up to date - NOT APPLICABLE    PSA up to date - NOT APPLICABLE    Dexa Scan up to date - NOT APPLICABLE    Flex Sig / Colonoscopy up to date - Yes    Immunizations up to date - Yes    Abuse: Current or Past(Physical, Sexual or Emotional)- No    Do you feel safe in your environment - Yes        07/2010                       Again, thank you for allowing me to participate in the care of your patient.      Sincerely,    Chet Cornejo MD

## 2023-05-27 ENCOUNTER — TELEPHONE (OUTPATIENT)
Dept: SLEEP MEDICINE | Facility: CLINIC | Age: 68
End: 2023-05-27
Payer: COMMERCIAL

## 2023-05-27 NOTE — TELEPHONE ENCOUNTER
Spoke with patient and he is still interested in a replacement CPAP. Patient will bring in his CPAP to [facility] for a manual download needed for prior auth. Patient has been informed [employer] has been denying prior auths for replacement CPAPs if the current one is still working. Patient would like to try anyway.

## 2023-06-12 NOTE — TELEPHONE ENCOUNTER
Left message letting patient know prior auth for replacement CPAP has been approved and to return call to schedule.

## 2023-06-23 ENCOUNTER — DOCUMENTATION ONLY (OUTPATIENT)
Dept: SLEEP MEDICINE | Facility: CLINIC | Age: 68
End: 2023-06-23
Payer: COMMERCIAL

## 2023-06-23 DIAGNOSIS — G47.33 OBSTRUCTIVE SLEEP APNEA (ADULT) (PEDIATRIC): Primary | ICD-10-CM

## 2023-06-23 NOTE — PROGRESS NOTES
Patient was offered choice of vendor and chose [facility].  Patient Humberto Pino was set up at [place] [facility] on June 23, 2023. Patient received a Resmed Airsense 11 Pressures were set at 8-15 cm H2O.   Patient s ramp is UNKNOWN cm H2O for UNKNOWN and FLEX/EPR is UNKNOWN.  Patient received a Resmed Mask name: AIRFIT F20  Full Face mask size Medium, Large, heated tubing and heated humidifier.  Patient has the following compliance requirements: using and visit requirements  Patient has a follow up on TBD.  Ayaan Hayes

## 2023-07-24 ENCOUNTER — MYC MEDICAL ADVICE (OUTPATIENT)
Dept: INTERNAL MEDICINE | Facility: CLINIC | Age: 68
End: 2023-07-24
Payer: COMMERCIAL

## 2023-07-26 ENCOUNTER — OFFICE VISIT (OUTPATIENT)
Dept: OTOLARYNGOLOGY | Facility: CLINIC | Age: 68
End: 2023-07-26
Payer: COMMERCIAL

## 2023-07-26 VITALS
BODY MASS INDEX: 30.34 KG/M2 | OXYGEN SATURATION: 97 % | DIASTOLIC BLOOD PRESSURE: 84 MMHG | TEMPERATURE: 97.4 F | WEIGHT: 224 LBS | HEART RATE: 57 BPM | SYSTOLIC BLOOD PRESSURE: 140 MMHG | HEIGHT: 72 IN

## 2023-07-26 DIAGNOSIS — J32.0 CHRONIC MAXILLARY SINUSITIS: Primary | ICD-10-CM

## 2023-07-26 DIAGNOSIS — R09.81 NASAL CONGESTION: ICD-10-CM

## 2023-07-26 DIAGNOSIS — D36.9 INVERTED PAPILLOMA: ICD-10-CM

## 2023-07-26 PROCEDURE — 99212 OFFICE O/P EST SF 10 MIN: CPT | Mod: 25 | Performed by: OTOLARYNGOLOGY

## 2023-07-26 PROCEDURE — 31237 NSL/SINS NDSC SURG BX POLYPC: CPT | Mod: RT | Performed by: OTOLARYNGOLOGY

## 2023-07-26 ASSESSMENT — PAIN SCALES - GENERAL: PAINLEVEL: NO PAIN (0)

## 2023-07-26 NOTE — PROGRESS NOTES
Blood pressure (!) 140/84, pulse 57, temperature 97.4  F (36.3  C), temperature source Temporal, height 1.829 m (6'), weight 101.6 kg (224 lb), SpO2 97 %.  Jacinda Zabala LPN

## 2023-07-26 NOTE — PROGRESS NOTES
[facility]  Return Visit  Encounter date:   July 26, 2023    Chief Complaint:   Follow-up     ID: inverted papilloma s/p as below 2/13/22    Interval History:   Humberto Pino is a 67 year old male who presents for follow up.        Sino-Nasal Outcome Test (SNOT - 22)  DNT     [facility]  Procedure Date: 02/13/2023     PREOPERATIVE DIAGNOSES:     1.  Left chronic maxillary and ethmoid sinusitis.  2.  Right maxillary sinus inverted papilloma.  3.  Chronic nasal congestion and eustachian tube dysfunction secondary to above.     POSTOPERATIVE DIAGNOSES:     1.  Left chronic maxillary and ethmoid sinusitis.  2.  Right maxillary sinus inverted papilloma.  3.  Chronic nasal congestion and eustachian tube dysfunction secondary to above.     PROCEDURE PERFORMED:    1.  Left endoscopic maxillary antrostomy.  2.  Left endoscopic partial ethmoidectomy.  3.  Right endoscopic modified medial maxillectomy via endoscopic Denker's approach for resection of the maxillary sinus tumor, likely inverted papilloma - modifier 22.  4.  Computerized image guidance, extradural.    Review of systems: A 14-point review of systems has been conducted and is negative for any notable symptoms, except as dictated in the history of present illness.     Physical Exam:  Vital signs: BP (!) 140/84 (BP Location: Left arm, Patient Position: Sitting, Cuff Size: Adult Regular)   Pulse 57   Temp 97.4  F (36.3  C) (Temporal)   Ht 1.829 m (6')   Wt 101.6 kg (224 lb)   SpO2 97%   BMI 30.38 kg/m     General Appearance: No acute distress, appropriate demeanor, conversant  Eyes: moist conjunctivae; EOMI; pupils symmetric; visual acuity grossly intact; no proptosis  Head: normocephalic; overall symmetric appearance without deformity  Face: overall symmetric without deformity; HB I/VI  Nose: No external deformity; see endoscopy  Lungs: symmetric chest rise; no wheezing  CV: Good distal perfusion; normal hear rate  Extremities: No  deformity  Neurologic Exam: Cranial nerves II-XII are grossly intact; no focal deficit       Procedure Note  Procedure performed: Rigid nasal endoscopy with right-sided debridement  Indication: To evaluate for sinonasal pathology not visualized on routine anterior rhinoscopy  Anesthesia: 4% topical lidocaine with 0.05 % oxymetazoline  Description of procedure: A 30 degree, 3 mm rigid endoscope was inserted into bilateral nasal cavities and the nasal valves, nasal cavity, middle meatus, sphenoethmoid recess, nasopharynx were evaluated for evidence of obstruction, edema, purulence, polyps and/or mass/lesion.     Scar tissue scar tissue of the right nasal cavity is lysed with straight through-cutting forceps and sharp scissors.    [place]-Nestor Endoscopic Scoring System  Endoscopic observation Right Left   Polyps in middle meatus (0 = absent, 1 = restricted to middle meatus, 2 = Beyond middle meatus) 0 0   Discharge (0 = absent, 1 = thin and clear, 2 = thick, purulent) 0 0   Edema (0 = absent, 1 = mild-moderate, 2 = moderate-severe) 0 0   Crusting (0 = absent, 1 = mild-moderate, 2 = moderate-severe) 0 0   Scarring (0= absent, 1 = mild-moderate, 2 = moderate-severe) 1 0   Total 0 0     Findings  RT: maxillary sinus clear with no signs of infection or residual tumor   LT: maxillary sinus clear with no signs of infection     The patient tolerated the procedure well without complication.     Laboratory Review:  N/A     Imaging Review:  CT facial bones 1/30/23  IMPRESSION:  1. Not substantially changed presumed right inverted papilloma with extension into the right nasal cavity and nasopharynx compared to 11/28/2022.  2. Not substantially changed partial opacification and mucosal thickening of the left maxillary sinus.     Pathology Review:  Surgical pathology 2/13/23   Final Diagnosis  A. MAXILLARY SINUS, RIGHT SINONASAL TUMOR, EXCISION:  -Inverted sinonasal (Schneiderian) papilloma with acute inflammation and reactive  changes, negative for high-grade  dysplasia or malignancy.      Biopsy pathology 12/7/23  Final Diagnosis  A. RIGHT NASAL CAVITY, BIOPSY:  - Fragments of sinonasal (schneiderian) papilloma, inverted type  - No evidence of high grade dysplasia or malignancy    Assessment/Medical Decision Making:  Inverted papilloma of the right maxillary sinus  Underlying chronic sinusitis  Nasal obstruction  Eustachian tube dysfunction  S/p as above       Bilateral endoscopy today - no signs of infection or residual tumor    He had some scar tissue in the right nasal cavity from the right lateral nasal wall of the septum.  This was debrided today and a piece of Gelfoam was placed.  Hopefully this will resolve the synechiae but he may have some recurrence.    Plan:  Continue saline rinses as needed  RTC in 3 months with Dr. Jeanette Cornejo MD    [facility]  Rhinology, Endoscopic Skull Base Surgery  [facility]  Department of Otolaryngology - Head & Neck Surgery    The above documentation was completed with the aid of voice recognition dictation software, and as a result, unexpected dictation errors may occur. Please don't hesitate to contact me for any clarification needed regarding this note.       Additional portions of the patient's history have been reviewed below.   ~~~~~~~~~~~~~~~~~~~~~~~~~~~~~~~~~~~~~~~~~~~~~~~~~~~~~~~~~~~~~~~~~~~~~~~~~~~~~~~~~~~~~~~~~~~~~~~~~~~~~~~~~~~~~~~~~~~~~~~~~~~~~~~~~~~~~~~    Past Medical History:   Diagnosis Date    Arthritis 01/01/2016    Chronic maxillary sinusitis     Diverticulosis of colon (without mention of hemorrhage)     repeat 2012    ADINA (obstructive sleep apnea)     Tinnitus         Past Surgical History:   Procedure Laterality Date    ARTHROSCOPY KNEE  08/04/2011    Procedure:ARTHROSCOPY KNEE; With debridement of medial and lateral meniscus  ; Surgeon:AMARILIS PETERS; Location:[facility]    ENDOSCOPIC ENDONASAL SURGERY Left 2/13/2023     Procedure: LEFT ENDOSCOPIC IMAGE-GUIDED MAXILLARY ANTROSTOMY WITH TISSUE REMOVAL;  Surgeon: Chet Cornejo MD;  Location: [facility] OR    HERNIA REPAIR, UMBILICAL      OPTICAL TRACKING SYSTEM ENDOSCOPIC SINUS SURGERY Right 2/13/2023    Procedure: RIGHT ENDOSCOPIC, IMAGE-GUIDED MEDIAL MAXILLECTOMY VIA DENKER'S APPROACH;  Surgeon: Chet Cornejo MD;  Location: [facility] OR    right thumb, index finger, middle finger and ring finger trigger release  2021    TOE SURGERY      feb 2006    ZZC REPAIR CRUCIATE LIGAMENT,KNEE      both knees-ACL        Family History   Problem Relation Age of Onset    Arthritis Mother         osteoporosis    Respiratory Mother         on oxygen-was smoker, COPD    Osteoporosis Mother         significant problem    Cerebrovascular Disease Father         Age 85-87    C.A.D. Father         Rapid heart rates    Other - See Comments Father 62        petuitary tumor    Cerebrovascular Disease Paternal Grandmother         Age about 80    Arthritis Paternal Grandmother     Cerebrovascular Disease Paternal Grandfather         age 94    Anesthesia Reaction No family hx of     Venous thrombosis No family hx of         Social History     Socioeconomic History    Marital status:      Spouse name: Manasa    Number of children: 2    Years of education: 23   Occupational History    Occupation: teacher     Employer: [employer]   Tobacco Use    Smoking status: Never    Smokeless tobacco: Never   Vaping Use    Vaping Use: Never used   Substance and Sexual Activity    Alcohol use: Yes     Comment: once/week    Drug use: No    Sexual activity: Yes     Partners: Female     Birth control/protection: None     Comment: none needed   Other Topics Concern    Parent/sibling w/ CABG, MI or angioplasty before 65F 55M? No   Social History Narrative    Dairy/d 2-3 servings/d.     Caffeine 0-3 servings/d    Exercise 3-4 x week    Sunscreen used - Yes    Seatbelts used - Yes    Working smoke/CO detectors in  the home - Yes    Guns stored in the home - No    Self Breast Exams - NOT APPLICABLE    Self Testicular Exam - Yes    Eye Exam up to date - Yes    Dental Exam up to date - Yes    Pap Smear up to date - NOT APPLICABLE    Mammogram up to date - NOT APPLICABLE    PSA up to date - NOT APPLICABLE    Dexa Scan up to date - NOT APPLICABLE    Flex Sig / Colonoscopy up to date - Yes    Immunizations up to date - Yes    Abuse: Current or Past(Physical, Sexual or Emotional)- No    Do you feel safe in your environment - Yes        07/2010

## 2023-07-26 NOTE — PATIENT INSTRUCTIONS
You were seen in the ENT Clinic today by Dr. Cornejo. If you have any questions or concerns after your appointment, please contact us (see below)       2.   Please return to the ENT clinic with Dr. Reid in 3 months. Please call to set up in approximately 2 months. The phone number to schedule is [phone].             How to Contact Us:  Send a [employer] message to your provider. Our team will respond to you via [employer]. Occasionally, we will need to call you to get further information.  For urgent matters (Monday-Friday), call the ENT Clinic: [phone] and speak with a call center team member - they will route your call appropriately.   If you'd like to speak directly with a nurse, please find our contact information below. We do our best to check voicemail frequently throughout the day, and will work to call you back within 1-2 days. For urgent matters, please use the general clinic phone numbers listed above.        Lisa CARUSO RN  ENT RN Care Coordinator  Direct: [phone]  Amy GIBSON LPN  Direct: [phone]         [facility]  Department of Otolaryngology

## 2023-07-26 NOTE — LETTER
7/26/2023       RE: Humberto Pino  [address]  [place] [address]     Dear Colleague,    Thank you for referring your patient, Humberto Pino, to the [facility] [place] at [facility]. Please see a copy of my visit note below.    Blood pressure (!) 140/84, pulse 57, temperature 97.4  F (36.3  C), temperature source Temporal, height 1.829 m (6'), weight 101.6 kg (224 lb), SpO2 97 %.  Jacinda Zabala LPN        [facility]  Return Visit  Encounter date:   July 26, 2023    Chief Complaint:   Follow-up     ID: inverted papilloma s/p as below 2/13/22    Interval History:   Humberto Pino is a 67 year old male who presents for follow up.        Sino-Nasal Outcome Test (SNOT - 22)  [facility] Operative History  Procedure Date: 02/13/2023     PREOPERATIVE DIAGNOSES:     1.  Left chronic maxillary and ethmoid sinusitis.  2.  Right maxillary sinus inverted papilloma.  3.  Chronic nasal congestion and eustachian tube dysfunction secondary to above.     POSTOPERATIVE DIAGNOSES:     1.  Left chronic maxillary and ethmoid sinusitis.  2.  Right maxillary sinus inverted papilloma.  3.  Chronic nasal congestion and eustachian tube dysfunction secondary to above.     PROCEDURE PERFORMED:    1.  Left endoscopic maxillary antrostomy.  2.  Left endoscopic partial ethmoidectomy.  3.  Right endoscopic modified medial maxillectomy via endoscopic Denker's approach for resection of the maxillary sinus tumor, likely inverted papilloma - modifier 22.  4.  Computerized image guidance, extradural.    Review of systems: A 14-point review of systems has been conducted and is negative for any notable symptoms, except as dictated in the history of present illness.     Physical Exam:  Vital signs: BP (!) 140/84 (BP Location: Left arm, Patient Position: Sitting, Cuff Size: Adult Regular)   Pulse 57   Temp 97.4  F (36.3  C) (Temporal)   Ht  1.829 m (6')   Wt 101.6 kg (224 lb)   SpO2 97%   BMI 30.38 kg/m     General Appearance: No acute distress, appropriate demeanor, conversant  Eyes: moist conjunctivae; EOMI; pupils symmetric; visual acuity grossly intact; no proptosis  Head: normocephalic; overall symmetric appearance without deformity  Face: overall symmetric without deformity; HB I/VI  Nose: No external deformity; see endoscopy  Lungs: symmetric chest rise; no wheezing  CV: Good distal perfusion; normal hear rate  Extremities: No deformity  Neurologic Exam: Cranial nerves II-XII are grossly intact; no focal deficit       Procedure Note  Procedure performed: Rigid nasal endoscopy with right-sided debridement  Indication: To evaluate for sinonasal pathology not visualized on routine anterior rhinoscopy  Anesthesia: 4% topical lidocaine with 0.05 % oxymetazoline  Description of procedure: A 30 degree, 3 mm rigid endoscope was inserted into bilateral nasal cavities and the nasal valves, nasal cavity, middle meatus, sphenoethmoid recess, nasopharynx were evaluated for evidence of obstruction, edema, purulence, polyps and/or mass/lesion.     Scar tissue scar tissue of the right nasal cavity is lysed with straight through-cutting forceps and sharp scissors.    [place]-Nestor Endoscopic Scoring System  Endoscopic observation Right Left   Polyps in middle meatus (0 = absent, 1 = restricted to middle meatus, 2 = Beyond middle meatus) 0 0   Discharge (0 = absent, 1 = thin and clear, 2 = thick, purulent) 0 0   Edema (0 = absent, 1 = mild-moderate, 2 = moderate-severe) 0 0   Crusting (0 = absent, 1 = mild-moderate, 2 = moderate-severe) 0 0   Scarring (0= absent, 1 = mild-moderate, 2 = moderate-severe) 1 0   Total 0 0     Findings  RT: maxillary sinus clear with no signs of infection or residual tumor   LT: maxillary sinus clear with no signs of infection     The patient tolerated the procedure well without complication.     Laboratory Review:  N/A     Imaging  Review:  CT facial bones 1/30/23  IMPRESSION:  1. Not substantially changed presumed right inverted papilloma with extension into the right nasal cavity and nasopharynx compared to 11/28/2022.  2. Not substantially changed partial opacification and mucosal thickening of the left maxillary sinus.     Pathology Review:  Surgical pathology 2/13/23   Final Diagnosis  A. MAXILLARY SINUS, RIGHT SINONASAL TUMOR, EXCISION:  -Inverted sinonasal (Schneiderian) papilloma with acute inflammation and reactive changes, negative for high-grade  dysplasia or malignancy.      Biopsy pathology 12/7/23  Final Diagnosis  A. RIGHT NASAL CAVITY, BIOPSY:  - Fragments of sinonasal (schneiderian) papilloma, inverted type  - No evidence of high grade dysplasia or malignancy    Assessment/Medical Decision Making:  Inverted papilloma of the right maxillary sinus  Underlying chronic sinusitis  Nasal obstruction  Eustachian tube dysfunction  S/p as above       Bilateral endoscopy today - no signs of infection or residual tumor    He had some scar tissue in the right nasal cavity from the right lateral nasal wall of the septum.  This was debrided today and a piece of Gelfoam was placed.  Hopefully this will resolve the synechiae but he may have some recurrence.    Plan:  Continue saline rinses as needed  RTC in 3 months with Dr. Jeanette Cornejo MD    [facility]  Rhinology, Endoscopic Skull Base Surgery  [facility]  Department of Otolaryngology - Head & Neck Surgery    The above documentation was completed with the aid of voice recognition dictation software, and as a result, unexpected dictation errors may occur. Please don't hesitate to contact me for any clarification needed regarding this note.       Additional portions of the patient's history have been reviewed below.    ~~~~~~~~~~~~~~~~~~~~~~~~~~~~~~~~~~~~~~~~~~~~~~~~~~~~~~~~~~~~~~~~~~~~~~~~~~~~~~~~~~~~~~~~~~~~~~~~~~~~~~~~~~~~~~~~~~~~~~~~~~~~~~~~~~~~~~~    Past Medical History:   Diagnosis Date     Arthritis 01/01/2016     Chronic maxillary sinusitis      Diverticulosis of colon (without mention of hemorrhage)     repeat 2012     ADINA (obstructive sleep apnea)      Tinnitus         Past Surgical History:   Procedure Laterality Date     ARTHROSCOPY KNEE  08/04/2011    Procedure:ARTHROSCOPY KNEE; With debridement of medial and lateral meniscus  ; Surgeon:AMARILIS PETERS; Location: OR     ENDOSCOPIC ENDONASAL SURGERY Left 2/13/2023    Procedure: LEFT ENDOSCOPIC IMAGE-GUIDED MAXILLARY ANTROSTOMY WITH TISSUE REMOVAL;  Surgeon: Chet Cornejo MD;  [place] [facility]     HERNIA REPAIR, UMBILICAL       OPTICAL TRACKING SYSTEM ENDOSCOPIC SINUS SURGERY Right 2/13/2023    Procedure: RIGHT ENDOSCOPIC, IMAGE-GUIDED MEDIAL MAXILLECTOMY VIA DENKER'S APPROACH;  Surgeon: Chet Cornejo MD;  [place] [facility]     right thumb, index finger, middle finger and ring finger trigger release  2021     TOE SURGERY      feb 2006     ZZC REPAIR CRUCIATE LIGAMENT,KNEE      both knees-ACL        Family History   Problem Relation Age of Onset     Arthritis Mother         osteoporosis     Respiratory Mother         on oxygen-was smoker, COPD     Osteoporosis Mother         significant problem     Cerebrovascular Disease Father         Age 85-87     C.A.D. Father         Rapid heart rates     Other - See Comments Father 62        petuitary tumor     Cerebrovascular Disease Paternal Grandmother         Age about 80     Arthritis Paternal Grandmother      Cerebrovascular Disease Paternal Grandfather         age 94     Anesthesia Reaction No family hx of      Venous thrombosis No family hx of         Social History     Socioeconomic History     Marital status:      Spouse name: Manasa     Number of children: 2     Years of education: 23    Occupational History     Occupation: teacher     Employer: [employer]   Tobacco Use     Smoking status: Never     Smokeless tobacco: Never   Vaping Use     Vaping Use: Never used   Substance and Sexual Activity     Alcohol use: Yes     Comment: once/week     Drug use: No     Sexual activity: Yes     Partners: Female     Birth control/protection: None     Comment: none needed   Other Topics Concern     Parent/sibling w/ CABG, MI or angioplasty before 65F 55M? No   Social History Narrative    Dairy/d 2-3 servings/d.     Caffeine 0-3 servings/d    Exercise 3-4 x week    Sunscreen used - Yes    Seatbelts used - Yes    Working smoke/CO detectors in the home - Yes    Guns stored in the home - No    Self Breast Exams - NOT APPLICABLE    Self Testicular Exam - Yes    Eye Exam up to date - Yes    Dental Exam up to date - Yes    Pap Smear up to date - NOT APPLICABLE    Mammogram up to date - NOT APPLICABLE    PSA up to date - NOT APPLICABLE    Dexa Scan up to date - NOT APPLICABLE    Flex Sig / Colonoscopy up to date - Yes    Immunizations up to date - Yes    Abuse: Current or Past(Physical, Sexual or Emotional)- No    Do you feel safe in your environment - Yes        07/2010                       Again, thank you for allowing me to participate in the care of your patient.      Sincerely,    Chet Cornejo MD

## 2023-08-15 ENCOUNTER — DOCUMENTATION ONLY (OUTPATIENT)
Dept: SLEEP MEDICINE | Facility: CLINIC | Age: 68
End: 2023-08-15
Payer: COMMERCIAL

## 2023-08-15 NOTE — PROGRESS NOTES
8/15/2023- DORIS- SPOKE WITH CHIP FROM [facility] FOR HELP WITH SCHEDULING. HE WAS ABLE TO GET APPT ON 9/23/23 IN [facility] AT 11AM FOR COMPLIANCE FOLLOW-UP.  COMPLIANACE NOTE UPDATED.

## 2023-08-25 ASSESSMENT — SLEEP AND FATIGUE QUESTIONNAIRES
HOW LIKELY ARE YOU TO NOD OFF OR FALL ASLEEP WHILE SITTING AND READING: WOULD NEVER DOZE
HOW LIKELY ARE YOU TO NOD OFF OR FALL ASLEEP WHEN YOU ARE A PASSENGER IN A CAR FOR AN HOUR WITHOUT A BREAK: SLIGHT CHANCE OF DOZING
HOW LIKELY ARE YOU TO NOD OFF OR FALL ASLEEP IN A CAR, WHILE STOPPED FOR A FEW MINUTES IN TRAFFIC: WOULD NEVER DOZE
HOW LIKELY ARE YOU TO NOD OFF OR FALL ASLEEP WHILE SITTING INACTIVE IN A PUBLIC PLACE: WOULD NEVER DOZE
HOW LIKELY ARE YOU TO NOD OFF OR FALL ASLEEP WHILE WATCHING TV: SLIGHT CHANCE OF DOZING
HOW LIKELY ARE YOU TO NOD OFF OR FALL ASLEEP WHILE SITTING AND TALKING TO SOMEONE: WOULD NEVER DOZE
HOW LIKELY ARE YOU TO NOD OFF OR FALL ASLEEP WHILE SITTING QUIETLY AFTER LUNCH WITHOUT ALCOHOL: SLIGHT CHANCE OF DOZING
HOW LIKELY ARE YOU TO NOD OFF OR FALL ASLEEP WHILE LYING DOWN TO REST IN THE AFTERNOON WHEN CIRCUMSTANCES PERMIT: SLIGHT CHANCE OF DOZING

## 2023-08-29 ENCOUNTER — OFFICE VISIT (OUTPATIENT)
Dept: SLEEP MEDICINE | Facility: CLINIC | Age: 68
End: 2023-08-29
Payer: COMMERCIAL

## 2023-08-29 VITALS
OXYGEN SATURATION: 97 % | DIASTOLIC BLOOD PRESSURE: 78 MMHG | SYSTOLIC BLOOD PRESSURE: 124 MMHG | HEIGHT: 73 IN | BODY MASS INDEX: 30.3 KG/M2 | HEART RATE: 68 BPM | WEIGHT: 228.6 LBS

## 2023-08-29 DIAGNOSIS — G47.33 OSA (OBSTRUCTIVE SLEEP APNEA): Primary | ICD-10-CM

## 2023-08-29 PROCEDURE — 99215 OFFICE O/P EST HI 40 MIN: CPT | Performed by: INTERNAL MEDICINE

## 2023-08-29 NOTE — PROGRESS NOTES
Obstructive Sleep Apnea - PAP Follow-Up Visit:    Chief Complaint   Patient presents with    CPAP Follow Up     CPAP follow up, has an error sign       Humberto Pino comes in today for follow-up of their mild sleep apnea, managed with CPAP.     ADINA was diagnosed at [facility] sleep clinic in 2008 and showed a baseline AHI of 5.1 per hour. He has been on PAP therapy since then,.     Do you use a CPAP Machine at home: Yes  Overall, on a scale of 0-10 how would you rate your CPAP (0 poor, 10 great): 9  Is your mask comfortable: Yes  If not, why:    Is you mask leaking: No  If yes, where do you feel it:    How many night per week does the mask leak (0-7):    Do you notice snoring with mask on: No  Do you notice gasping arousals with mask on: No  Are you having significant oral or nasal dryness: No  Is the pressure setting comfortable: Yes  In not, why:    What type of mask do you use: Full Face Mask  What is your typical bedtime: 8 pm  How long does it take you to go to sleep on PAP therapy: 10 minutes  What time do you typically get out of bed for the day: 6 am  How many hours on average per night are you using PAP therapy: 9 or 10  How many hours are you sleeping per night: 9 or 10  Do you feel well rested in the morning: Yes     ResMed     Auto-PAP 8-15 cmH2O download:  29/30 total days of use. 1 nonuse days. 97% days with >4 hours use.  Average use 9 hours 34 minutes per day. Median Leak 0 L/min. 95%ile Leak 1 L/min. CPAP 95% pressure 14.8cm. AHI 0.8    EPWORTH SLEEPINESS SCALE         8/25/2023     3:55 PM    D[place] Sleepiness Scale ( ZHOU Starr  6312-3219<br>ESS - [place]/English - Final version - 21 Nov 07 - [facility] [place].)   Sitting and reading Would never doze   Watching TV Slight chance of dozing   Sitting, inactive in a public place (e.g. a theatre or a meeting) Would never doze   As a passenger in a car for an hour without a break Slight chance of dozing   Lying down to rest in the afternoon  when circumstances permit Slight chance of dozing   Sitting and talking to someone Would never doze   Sitting quietly after a lunch without alcohol Slight chance of dozing   In a car, while stopped for a few minutes in traffic Would never doze   Hubbell Score (MC) 4   Hubbell Score (Sleep) 4       INSOMNIA SEVERITY INDEX (KENDALL)          8/25/2023     3:53 PM   Insomnia Severity Index (KENDALL)   Difficulty falling asleep 0   Difficulty staying asleep 0   Problems waking up too early 1   How SATISFIED/DISSATISFIED are you with your CURRENT sleep pattern? 1   How NOTICEABLE to others do you think your sleep problem is in terms of impairing the quality of your life? 0   How WORRIED/DISTRESSED are you about your current sleep problem? 1   To what extent do you consider your sleep problem to INTERFERE with your daily functioning (e.g. daytime fatigue, mood, ability to function at work/daily chores, concentration, memory, mood, etc.) CURRENTLY? 0   KENDALL Total Score 3       Guidelines for Scoring/Interpretation:  Total score categories:  0-7 = No clinically significant insomnia   8-14 = Subthreshold insomnia   15-21 = Clinical insomnia (moderate severity)  22-28 = Clinical insomnia (severe)  Used via courtesy of www.[employer].va.gov with permission from Shmuel Dela Cruz PhD., [facility]      Problem List:  Patient Active Problem List    Diagnosis Date Noted    Chronic maxillary sinusitis 12/12/2022     Priority: Medium     Added automatically from request for surgery [id number]      Nasal obstruction 12/12/2022     Priority: Medium     Added automatically from request for surgery [id number]      Adenomatous polyp 05/02/2022     Priority: Medium    Diverticulosis of large intestine 05/02/2022     Priority: Medium    Triggering of digit 08/04/2020     Priority: Medium    Primary osteoarthritis of both feet 09/10/2019     Priority: Medium    Obesity, Class I, BMI 30-34.9 09/10/2019     Priority: Medium    Neck arthritis 11/28/2018  "    Priority: Medium    Arthritis 01/01/2016     Priority: Medium    BMI 30-35 07/31/2014     Priority: Medium    Hallux varus 08/14/2013     Priority: Medium    ADINA (obstructive sleep apnea) 08/12/2008     Priority: Medium    Obstructive sleep apnea syndrome 08/12/2008     Priority: Medium          /78   Pulse 68   Ht 1.842 m (6' 0.5\")   Wt 103.7 kg (228 lb 9.6 oz)   SpO2 97%   BMI 30.58 kg/m      Impression/Plan:     Mild sleep apnea.     - Patient is regularly using CPAP therapy and benefiting from treatment.  Review of download data from his device shows regular compliance and normal residual AHI, confirming adequate treatment of sleep apnea.    - We reviewed optimal sleep hygiene practices and sleep schedules during the visit.    Plan:     -Continue auto CPAP therapy    Humberto Pino will follow up in about 1 year(s).     I spent a total of 20 minutes for this appointment on this date of service which include time spent before, during and after the visit for chart review, patient care, counseling and coordination of care..      Costa Mayer MD    CC:  Rajiv Ludwig,   "

## 2023-08-29 NOTE — PROGRESS NOTES
[facility]   Outpatient Sleep Medicine Follow-up Visit  August 29, 2023    Name: Humberto Pino MRN# [id number]   Age: 68 year old YOB: 1955     Date of Consultation: August 29, 2023  Consultation is requested by: No referring provider defined for this encounter.  Primary care provider: Rajiv Ludwig           Assessment and Plan:     Impression/Plan:     Mild sleep apnea.      - Patient is regularly using CPAP therapy and benefiting from treatment.  Review of download data from his device shows regular compliance and normal residual AHI, confirming adequate treatment of sleep apnea.     - We reviewed optimal sleep hygiene practices and sleep schedules during the visit.     Plan:      -Continue auto CPAP therapy     Humberto Pino will follow up in about 1 year(s).            Summary Recommendations:    Orders Placed This Encounter   Procedures    Comprehensive DME              History of Present Illness:   I had the pleasure of seeing Humberto Pino who comes in today for follow-up of their mild sleep apnea, managed with CPAP. He was last seen in clinic with Dr. Mayer.   ADINA was diagnosed at [facility] sleep clinic in 2008 and showed a baseline AHI of 5.1 per hour. He has been on PAP therapy since then.  Overall, the patient rates their experience with PAP as 10 (0 poor, 10 great). The mask is comfortable. The mask is not leaking, 0 nights per week. They are not snoring with the mask on. They are not having gasp arousals.  They are not having significant oral/nasal dryness. The pressure settings are comfortable.     Patient uses full-face mask.    Bedtime is typically 8:30 PM. Usually it takes about 10 minutes to fall asleep with the mask on. Wake time is typically 6 am. Patient is using PAP therapy 8-9 hours per night. The patient is usually getting 9 hours of sleep per night.     Patient does feel rested in the morning.    Total score - Mina: 4 (8/25/2023   3:55 PM)    PREVIOUS SLEEP STUDIES:  ADINA was diagnosed at [facility] sleep clinic in 2008 and showed a baseline AHI of 5.1 per hour. He has been on PAP therapy since then,.        Most Recent SCALES:    EPWORTH SLEEPINESS SCALE WITHIN 1 YEAR WITHIN 10 DAYS   Sitting and reading 0 0   Watching TV 1 1   Sitting, inactive in a public place (theatre or mtg.) 0  0    As a passenger in a car 1 1   Lying down to rest in the afternoon when circumstance permit 1 1   Sitting and talking to someone 0 0   Sitting quietly after lunch without alcohol 1 1   In a car, while stopped for a few minutes in traffic 0 0   TOTAL SCORE 4 4   Normal < 11         0--none    1--mild    2--moderate  3--severe      INSOMNIA SEVERITY INDEX WITHIN 1 YEAR   Difficulty falling asleep 0   Difficult staying asleep 0   Problems waking up to early 1   How SATISFIED/DISSATISFIED are you with your CURRENT sleep pattern? 1   How NOTICEABLE to others do you think your sleep pattern is in terms of your quality of life? 0   How WORRIED/DISTRESSED are you about your current sleep pattern? 1   To what extent do you consider your sleep problem to INTERFERE with your daily fuctioning(e.g. daytime fatigue, mood, ability to function at work/daily chores, concentration, mood,etc.) CURRENTLY? 0   INSOMNIA SEVERITY INDEX TOTAL SCORE 3    --absence of insomnia (0-7); sub-threshold insomnia (8-14); moderate insomnia (15-21); and severe insomnia (22-28)--        No data recorded    Objective:  CPAP Compliance Targets:   >70% days > 4 hours AHI < 5   30 days ending August 29, 2023  Mask type:  Full Face Mask   ResMed     Auto-PAP 5.0 - 18.0 cmH2O 30 day usage data:    100% of days with > 4 hours of use. 0/30 days with no use.   Average use 556 minutes per day.   95%ile Leak 0 L/min.   CPAP 95% pressure 14.2 cm.   AHI 0.69 events per hour.                   Medications:     Current Outpatient Medications   Medication Sig    Fiber TABS 2 tablets as needed     fish  oil-omega-3 fatty acids 1000 MG capsule Take 1 g by mouth every morning    Multiple Vitamin (MULTI VITAMIN MENS PO) Take 1 tablet by mouth every morning    ORDER FOR DME Use CPAP as directed by your provider    sodium chloride (OCEAN) 0.65 % nasal spray Spray 2 sprays in nostril 3 times daily    Sodium Chloride-Sodium Bicarb (NETI POT SINUS WASH NA)      No current facility-administered medications for this visit.        Allergies   Allergen Reactions    Hay Fever & [A.R.M.] Other (See Comments)     Sneezing & stuffy nose            Problem List:     Patient Active Problem List   Diagnosis    ADINA (obstructive sleep apnea)    Hallux varus    BMI 30-35    Arthritis    Neck arthritis    Primary osteoarthritis of both feet    Triggering of digit    Obesity, Class I, BMI 30-34.9    Obstructive sleep apnea syndrome    Adenomatous polyp    Diverticulosis of large intestine    Chronic maxillary sinusitis    Nasal obstruction            Past Medical History:     Does not need 02 supplement at night   Past Medical History:   Diagnosis Date    Arthritis 01/01/2016    Chronic maxillary sinusitis     Diverticulosis of colon (without mention of hemorrhage)     repeat 2012    ADINA (obstructive sleep apnea)     Tinnitus              Past Surgical History:    No h/o  upper airway surgery  Past Surgical History:   Procedure Laterality Date    ARTHROSCOPY KNEE  08/04/2011    Procedure:ARTHROSCOPY KNEE; With debridement of medial and lateral meniscus  ; Surgeon:AMARILIS PETERS; [facility]    ENDOSCOPIC ENDONASAL SURGERY Left 2/13/2023    Procedure: LEFT ENDOSCOPIC IMAGE-GUIDED MAXILLARY ANTROSTOMY WITH TISSUE REMOVAL;  Surgeon: Chet Cornejo MD;  [place] [facility] [place]    HERNIA REPAIR, UMBILICAL      OPTICAL TRACKING SYSTEM ENDOSCOPIC SINUS SURGERY Right 2/13/2023    Procedure: RIGHT ENDOSCOPIC, IMAGE-GUIDED MEDIAL MAXILLECTOMY VIA DENKER'S APPROACH;  Surgeon: Chet Cornejo MD;  [place] [facility] [place]    right thumb, index  "finger, middle finger and ring finger trigger release  2021    TOE SURGERY      feb 2006    ZZC REPAIR CRUCIATE LIGAMENT,KNEE      both knees-ACL              Physical Examination:   Objective:  Vitals: /78   Pulse 68   Ht 1.842 m (6' 0.5\")   Wt 103.7 kg (228 lb 9.6 oz)   SpO2 97%   BMI 30.58 kg/m    BMI= Body mass index is 30.58 kg/m .  Exam:  Constitutional: healthy, alert, and no distress  Head: Normocephalic. No masses, lesions, tenderness or abnormalities  ENT: ENT exam normal, no neck nodes or sinus tenderness  Cardiovascular: negative, PMI normal. No lifts, heaves, or thrills. RRR. No murmurs, clicks gallops or rub  Respiratory: negative, Percussion normal. Good diaphragmatic excursion. Lungs clear  Gastrointestinal: Abdomen soft, non-tender. BS normal. No masses, organomegaly  : Deferred  Musculoskeletal: extremities normal- no gross deformities noted, gait normal, and normal muscle tone  Skin: no suspicious lesions or rashes  Neurologic: Gait normal. Reflexes normal and symmetric. Sensation grossly WNL.  Psychiatric: mentation appears normal and affect normal/bright    Copy to: Rajiv Ludwig MD 8/29/2023     RTC in 12 months    Total time spent reviewing medical records including previous testing and interpretation as well as direct patient contact and documentation on this date: 40 minutes  " Dr NORMA Evans

## 2023-09-14 ENCOUNTER — TELEPHONE (OUTPATIENT)
Dept: OTOLARYNGOLOGY | Facility: CLINIC | Age: 68
End: 2023-09-14
Payer: COMMERCIAL

## 2023-09-14 NOTE — TELEPHONE ENCOUNTER
Spoke with patient regarding scheduling for a sooner appointment from the wait-list with  who is replacing . Patient is happy with late October appointment as currently scheduled.-Per Patient

## 2023-10-10 ENCOUNTER — IMMUNIZATION (OUTPATIENT)
Dept: FAMILY MEDICINE | Facility: CLINIC | Age: 68
End: 2023-10-10
Payer: COMMERCIAL

## 2023-10-10 PROCEDURE — 90662 IIV NO PRSV INCREASED AG IM: CPT

## 2023-10-10 PROCEDURE — G0008 ADMIN INFLUENZA VIRUS VAC: HCPCS

## 2023-10-26 ENCOUNTER — OFFICE VISIT (OUTPATIENT)
Dept: OTOLARYNGOLOGY | Facility: CLINIC | Age: 68
End: 2023-10-26
Payer: COMMERCIAL

## 2023-10-26 VITALS
SYSTOLIC BLOOD PRESSURE: 150 MMHG | WEIGHT: 232.7 LBS | DIASTOLIC BLOOD PRESSURE: 88 MMHG | HEIGHT: 73 IN | BODY MASS INDEX: 30.84 KG/M2 | HEART RATE: 74 BPM

## 2023-10-26 DIAGNOSIS — J32.0 CHRONIC MAXILLARY SINUSITIS: ICD-10-CM

## 2023-10-26 DIAGNOSIS — D36.9 INVERTED PAPILLOMA: Primary | ICD-10-CM

## 2023-10-26 PROCEDURE — 31231 NASAL ENDOSCOPY DX: CPT | Performed by: STUDENT IN AN ORGANIZED HEALTH CARE EDUCATION/TRAINING PROGRAM

## 2023-10-26 PROCEDURE — 99213 OFFICE O/P EST LOW 20 MIN: CPT | Mod: 25 | Performed by: STUDENT IN AN ORGANIZED HEALTH CARE EDUCATION/TRAINING PROGRAM

## 2023-10-26 ASSESSMENT — PAIN SCALES - GENERAL: PAINLEVEL: NO PAIN (0)

## 2023-10-26 NOTE — PATIENT INSTRUCTIONS
You were seen in the ENT Clinic today by Dr. Reid. If you have any questions or concerns after your appointment, please contact us (see below)       2.   Please return to the ENT clinic in 6 months.           How to Contact Us:  Send a [facility] message to your provider. Our team will respond to you via [facility]. Occasionally, we will need to call you to get further information.  For urgent matters (Monday-Friday), call the ENT Clinic: [phone] and speak with a call center team member - they will route your call appropriately.   If you'd like to speak directly with a nurse, please find our contact information below. We do our best to check voicemail frequently throughout the day, and will work to call you back within 1-2 days. For urgent matters, please use the general clinic phone numbers listed above.        Lisa CARUSO RN  ENT RN Care Coordinator  Direct: [phone]  Amy GIBSON LPN  Direct: [phone]         [facility]  Department of Otolaryngology

## 2023-10-26 NOTE — PROGRESS NOTES
"  [facility]  Return Visit  Encounter date:   July 26, 2023    Chief Complaint:   Follow-up     ID: inverted papilloma s/p as below 2/13/22    Interval History:   Humberto Pino is a 67 year old male who presents for follow up. No new issues. Very happy with his care       Sino-Nasal Outcome Test (SNOT - 22)  DNT     [place] [facility]  Procedure Date: 02/13/2023     PREOPERATIVE DIAGNOSES:     1.  Left chronic maxillary and ethmoid sinusitis.  2.  Right maxillary sinus inverted papilloma.  3.  Chronic nasal congestion and eustachian tube dysfunction secondary to above.     POSTOPERATIVE DIAGNOSES:     1.  Left chronic maxillary and ethmoid sinusitis.  2.  Right maxillary sinus inverted papilloma.  3.  Chronic nasal congestion and eustachian tube dysfunction secondary to above.     PROCEDURE PERFORMED:    1.  Left endoscopic maxillary antrostomy.  2.  Left endoscopic partial ethmoidectomy.  3.  Right endoscopic modified medial maxillectomy via endoscopic Denker's approach for resection of the maxillary sinus tumor, likely inverted papilloma - modifier 22.  4.  Computerized image guidance, extradural.    Review of systems: A 14-point review of systems has been conducted and is negative for any notable symptoms, except as dictated in the history of present illness.     Physical Exam:  Vital signs: BP (!) 150/88 (BP Location: Right arm, Patient Position: Sitting, Cuff Size: Adult Regular)   Pulse 74   Ht 1.854 m (6' 1\")   Wt 105.6 kg (232 lb 11.2 oz)   BMI 30.70 kg/m     General Appearance: No acute distress, appropriate demeanor, conversant  Eyes: moist conjunctivae; EOMI; pupils symmetric; visual acuity grossly intact; no proptosis  Head: normocephalic; overall symmetric appearance without deformity  Face: overall symmetric without deformity; HB I/VI  Nose: No external deformity; see endoscopy  Lungs: symmetric chest rise; no wheezing  CV: Good distal perfusion; normal hear rate  Extremities: " No deformity  Neurologic Exam: Cranial nerves II-XII are grossly intact; no focal deficit       Procedure Note  Procedure performed: Rigid nasal endoscopy with right-sided debridement  Indication: To evaluate for sinonasal pathology not visualized on routine anterior rhinoscopy  Anesthesia: 4% topical lidocaine with 0.05 % oxymetazoline  Description of procedure: A 30 degree, 3 mm rigid endoscope was inserted into bilateral nasal cavities and the nasal valves, nasal cavity, middle meatus, sphenoethmoid recess, nasopharynx were evaluated for evidence of obstruction, edema, purulence, polyps and/or mass/lesion.       Joey-Nestor Endoscopic Scoring System  Endoscopic observation Right Left   Polyps in middle meatus (0 = absent, 1 = restricted to middle meatus, 2 = Beyond middle meatus) 0 0   Discharge (0 = absent, 1 = thin and clear, 2 = thick, purulent) 0 0   Edema (0 = absent, 1 = mild-moderate, 2 = moderate-severe) 0 0   Crusting (0 = absent, 1 = mild-moderate, 2 = moderate-severe) 0 0   Scarring (0= absent, 1 = mild-moderate, 2 = moderate-severe) 1 0   Total 1 0     Findings  RT: maxillary sinus clear with no signs of infection or residual tumor   LT: maxillary sinus clear with no signs of infection     The patient tolerated the procedure well without complication.     Laboratory Review:  N/A     Imaging Review:  CT facial bones 1/30/23  IMPRESSION:  1. Not substantially changed presumed right inverted papilloma with extension into the right nasal cavity and nasopharynx compared to 11/28/2022.  2. Not substantially changed partial opacification and mucosal thickening of the left maxillary sinus.     Pathology Review:  Surgical pathology 2/13/23   Final Diagnosis  A. MAXILLARY SINUS, RIGHT SINONASAL TUMOR, EXCISION:  -Inverted sinonasal (Schneiderian) papilloma with acute inflammation and reactive changes, negative for high-grade  dysplasia or malignancy.      Biopsy pathology 12/7/23  Final Diagnosis  A. 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 No issues with breathing.     Plan:  RTC in 6 months        Past Medical History:   Diagnosis Date    Arthritis 01/01/2016    Chronic maxillary sinusitis     Diverticulosis of colon (without mention of hemorrhage)     repeat 2012    ADINA (obstructive sleep apnea)     Tinnitus         Past Surgical History:   Procedure Laterality Date    ARTHROSCOPY KNEE  08/04/2011    Procedure:ARTHROSCOPY KNEE; With debridement of medial and lateral meniscus  ; Surgeon:AMARILIS PETERS; [facility]    ENDOSCOPIC ENDONASAL SURGERY Left 2/13/2023    Procedure: LEFT ENDOSCOPIC IMAGE-GUIDED MAXILLARY ANTROSTOMY WITH TISSUE REMOVAL;  Surgeon: Chet Cornejo MD;  [place] [facility] [place]    HERNIA REPAIR, UMBILICAL      OPTICAL TRACKING SYSTEM ENDOSCOPIC SINUS SURGERY Right 2/13/2023    Procedure: RIGHT ENDOSCOPIC, IMAGE-GUIDED MEDIAL MAXILLECTOMY VIA DENKER'S APPROACH;  Surgeon: Chet Cornejo MD;  Location: [facility] OR    right thumb, index finger, middle finger and ring finger trigger release  2021    TOE SURGERY      feb 2006    ZZC REPAIR CRUCIATE LIGAMENT,KNEE      both knees-ACL        Family History   Problem Relation Age of Onset    Arthritis Mother         osteoporosis    Respiratory Mother         on oxygen-was smoker, COPD    Osteoporosis Mother         significant problem    Cerebrovascular Disease Father         Age 85-87    C.A.D. Father         Rapid heart rates    Other - See Comments Father 62        petuitary tumor     Cerebrovascular Disease Paternal Grandmother         Age about 80    Arthritis Paternal Grandmother     Cerebrovascular Disease Paternal Grandfather         age 94    Anesthesia Reaction No family hx of     Venous thrombosis No family hx of         Social History     Socioeconomic History    Marital status:      Spouse name: Manasa    Number of children: 2    Years of education: 23    Highest education level: None   Occupational History    Occupation: teacher     Employer: [employer]   Tobacco Use    Smoking status: Never     Passive exposure: Past    Smokeless tobacco: Never   Vaping Use    Vaping Use: Never used   Substance and Sexual Activity    Alcohol use: Yes     Comment: once/week    Drug use: No    Sexual activity: Yes     Partners: Female     Birth control/protection: None     Comment: none needed   Other Topics Concern    Parent/sibling w/ CABG, MI or angioplasty before 65F 55M? No   Social History Narrative    Dairy/d 2-3 servings/d.     Caffeine 0-3 servings/d    Exercise 3-4 x week    Sunscreen used - Yes    Seatbelts used - Yes    Working smoke/CO detectors in the home - Yes    Guns stored in the home - No    Self Breast Exams - NOT APPLICABLE    Self Testicular Exam - Yes    Eye Exam up to date - Yes    Dental Exam up to date - Yes    Pap Smear up to date - NOT APPLICABLE    Mammogram up to date - NOT APPLICABLE    PSA up to date - NOT APPLICABLE    Dexa Scan up to date - NOT APPLICABLE    Flex Sig / Colonoscopy up to date - Yes    Immunizations up to date - Yes    Abuse: Current or Past(Physical, Sexual or Emotional)- No    Do you feel safe in your environment - Yes        07/2010

## 2023-10-26 NOTE — LETTER
"10/26/2023       RE: Humberto Pino  [address]  [place] [address]     Dear Colleague,    Thank you for referring your patient, Humberto Pino, to the [facility] [place] at [facility]. Please see a copy of my visit note below.          [facility]  Return Visit  Encounter date:   July 26, 2023    Chief Complaint:   Follow-up     ID: inverted papilloma s/p as below 2/13/22    Interval History:   Humberto Pino is a 67 year old male who presents for follow up. No new issues. Very happy with his care       Sino-Nasal Outcome Test (SNOT - 22)  [facility] Operative History  Procedure Date: 02/13/2023     PREOPERATIVE DIAGNOSES:     1.  Left chronic maxillary and ethmoid sinusitis.  2.  Right maxillary sinus inverted papilloma.  3.  Chronic nasal congestion and eustachian tube dysfunction secondary to above.     POSTOPERATIVE DIAGNOSES:     1.  Left chronic maxillary and ethmoid sinusitis.  2.  Right maxillary sinus inverted papilloma.  3.  Chronic nasal congestion and eustachian tube dysfunction secondary to above.     PROCEDURE PERFORMED:    1.  Left endoscopic maxillary antrostomy.  2.  Left endoscopic partial ethmoidectomy.  3.  Right endoscopic modified medial maxillectomy via endoscopic Denker's approach for resection of the maxillary sinus tumor, likely inverted papilloma - modifier 22.  4.  Computerized image guidance, extradural.    Review of systems: A 14-point review of systems has been conducted and is negative for any notable symptoms, except as dictated in the history of present illness.     Physical Exam:  Vital signs: BP (!) 150/88 (BP Location: Right arm, Patient Position: Sitting, Cuff Size: Adult Regular)   Pulse 74   Ht 1.854 m (6' 1\")   Wt 105.6 kg (232 lb 11.2 oz)   BMI 30.70 kg/m     General Appearance: No acute distress, appropriate demeanor, conversant  Eyes: moist conjunctivae; " EOMI; pupils symmetric; visual acuity grossly intact; no proptosis  Head: normocephalic; overall symmetric appearance without deformity  Face: overall symmetric without deformity; HB I/VI  Nose: No external deformity; see endoscopy  Lungs: symmetric chest rise; no wheezing  CV: Good distal perfusion; normal hear rate  Extremities: No deformity  Neurologic Exam: Cranial nerves II-XII are grossly intact; no focal deficit       Procedure Note  Procedure performed: Rigid nasal endoscopy with right-sided debridement  Indication: To evaluate for sinonasal pathology not visualized on routine anterior rhinoscopy  Anesthesia: 4% topical lidocaine with 0.05 % oxymetazoline  Description of procedure: A 30 degree, 3 mm rigid endoscope was inserted into bilateral nasal cavities and the nasal valves, nasal cavity, middle meatus, sphenoethmoid recess, nasopharynx were evaluated for evidence of obstruction, edema, purulence, polyps and/or mass/lesion.       [place]-[facility]or Endoscopic Scoring System  Endoscopic observation Right Left   Polyps in middle meatus (0 = absent, 1 = restricted to middle meatus, 2 = Beyond middle meatus) 0 0   Discharge (0 = absent, 1 = thin and clear, 2 = thick, purulent) 0 0   Edema (0 = absent, 1 = mild-moderate, 2 = moderate-severe) 0 0   Crusting (0 = absent, 1 = mild-moderate, 2 = moderate-severe) 0 0   Scarring (0= absent, 1 = mild-moderate, 2 = moderate-severe) 1 0   Total 1 0     Findings  RT: maxillary sinus clear with no signs of infection or residual tumor   LT: maxillary sinus clear with no signs of infection     The patient tolerated the procedure well without complication.     Laboratory Review:  N/A     Imaging Review:  CT facial bones 1/30/23  IMPRESSION:  1. Not substantially changed presumed right inverted papilloma with extension into the right nasal cavity and nasopharynx compared to 11/28/2022.  2. Not substantially changed partial opacification and mucosal thickening of the left  maxillary sinus.     Pathology Review:  Surgical pathology 2/13/23   Final Diagnosis  A. MAXILLARY SINUS, RIGHT SINONASAL TUMOR, EXCISION:  -Inverted sinonasal (Schneiderian) papilloma with acute inflammation and reactive changes, negative for high-grade  dysplasia or malignancy.      Biopsy pathology 12/7/23  Final Diagnosis  A. RIGHT NASAL CAVITY, BIOPSY:  - Fragments of sinonasal (schneiderian) papilloma, inverted type  - No evidence of high grade dysplasia or malignancy    Assessment/Medical Decision Making:  Inverted papilloma of the right maxillary sinus  Underlying chronic sinusitis  Nasal obstruction  Eustachian tube dysfunction  S/p as above       Bilateral endoscopy today - no signs of infection or residual tumor    He had some scar tissue in the right nasal cavity from the right lateral nasal wall of the septum. No issues with breathing.     Plan:  RTC in 6 months        Past Medical History:   Diagnosis Date    Arthritis 01/01/2016    Chronic maxillary sinusitis     Diverticulosis of colon (without mention of hemorrhage)     repeat 2012    ADINA (obstructive sleep apnea)     Tinnitus         Past Surgical History:   Procedure Laterality Date    ARTHROSCOPY KNEE  08/04/2011    Procedure:ARTHROSCOPY KNEE; With debridement of medial and lateral meniscus  ; Surgeon:AMARILIS PETERS; Location:[facility]    ENDOSCOPIC ENDONASAL SURGERY Left 2/13/2023    Procedure: LEFT ENDOSCOPIC IMAGE-GUIDED MAXILLARY ANTROSTOMY WITH TISSUE REMOVAL;  Surgeon: Chet Cornejo MD;  Location: [facility] OR    HERNIA REPAIR, UMBILICAL      OPTICAL TRACKING SYSTEM ENDOSCOPIC SINUS SURGERY Right 2/13/2023    Procedure: RIGHT ENDOSCOPIC, IMAGE-GUIDED MEDIAL MAXILLECTOMY VIA DENKER'S APPROACH;  Surgeon: Chet Cornejo MD;  Location: [facility] OR    right thumb, index finger, middle finger and ring finger trigger release  2021    TOE SURGERY      feb 2006    ZZC REPAIR CRUCIATE LIGAMENT,KNEE      both knees-ACL        Family History   Problem  Relation Age of Onset    Arthritis Mother         osteoporosis    Respiratory Mother         on oxygen-was smoker, COPD    Osteoporosis Mother         significant problem    Cerebrovascular Disease Father         Age 85-87    C.A.D. Father         Rapid heart rates    Other - See Comments Father 62        petuitary tumor    Cerebrovascular Disease Paternal Grandmother         Age about 80    Arthritis Paternal Grandmother     Cerebrovascular Disease Paternal Grandfather         age 94    Anesthesia Reaction No family hx of     Venous thrombosis No family hx of         Social History     Socioeconomic History    Marital status:      Spouse name: Manasa    Number of children: 2    Years of education: 23    Highest education level: None   Occupational History    Occupation: teacher     Employer: [employer]   Tobacco Use    Smoking status: Never     Passive exposure: Past    Smokeless tobacco: Never   Vaping Use    Vaping Use: Never used   Substance and Sexual Activity    Alcohol use: Yes     Comment: once/week    Drug use: No    Sexual activity: Yes     Partners: Female     Birth control/protection: None     Comment: none needed   Other Topics Concern    Parent/sibling w/ CABG, MI or angioplasty before 65F 55M? No   Social History Narrative    Dairy/d 2-3 servings/d.     Caffeine 0-3 servings/d    Exercise 3-4 x week    Sunscreen used - Yes    Seatbelts used - Yes    Working smoke/CO detectors in the home - Yes    Guns stored in the home - No    Self Breast Exams - NOT APPLICABLE    Self Testicular Exam - Yes    Eye Exam up to date - Yes    Dental Exam up to date - Yes    Pap Smear up to date - NOT APPLICABLE    Mammogram up to date - NOT APPLICABLE    PSA up to date - NOT APPLICABLE    Dexa Scan up to date - NOT APPLICABLE    Flex Sig / Colonoscopy up to date - Yes    Immunizations up to date - Yes    Abuse: Current or Past(Physical, Sexual or Emotional)- No    Do you feel safe in your environment  - Yes        07/2010                       Again, thank you for allowing me to participate in the care of your patient.      Sincerely,    Steven Reid MD

## 2023-10-26 NOTE — PROGRESS NOTES
"Chief Complaint   Patient presents with    Follow Up       Vitals:    10/26/23 1546   BP: (!) 150/88   BP Location: Right arm   Patient Position: Sitting   Cuff Size: Adult Regular   Pulse: 74   Weight: 105.6 kg (232 lb 11.2 oz)   Height: 1.854 m (6' 1\")       Body mass index is 30.7 kg/m .    Mayra Fofana"

## 2023-10-31 ENCOUNTER — TRANSFERRED RECORDS (OUTPATIENT)
Dept: HEALTH INFORMATION MANAGEMENT | Facility: CLINIC | Age: 68
End: 2023-10-31
Payer: COMMERCIAL

## 2023-12-13 ENCOUNTER — OFFICE VISIT (OUTPATIENT)
Dept: AUDIOLOGY | Facility: CLINIC | Age: 68
End: 2023-12-13
Payer: COMMERCIAL

## 2023-12-13 DIAGNOSIS — H93.13 TINNITUS OF BOTH EARS: Primary | ICD-10-CM

## 2023-12-13 PROCEDURE — 92557 COMPREHENSIVE HEARING TEST: CPT | Mod: AB | Performed by: AUDIOLOGIST

## 2023-12-13 PROCEDURE — 92550 TYMPANOMETRY & REFLEX THRESH: CPT | Mod: AB | Performed by: AUDIOLOGIST

## 2023-12-13 NOTE — PROGRESS NOTES
AUDIOLOGY REPORT    SUBJECTIVE:  Humberto Pino is a 68 year old male who was seen in the Audiology Clinic at the [facility] for audiologic evaluation. The patient has been seen previously in this clinic on 11/28/22 for assessment and results indicated normal hearing bilaterally. 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 Patient reports no noticable changes in hearing and occasional bilateral tinnitus. Denies pain, dizziness or any other changes in otologic symptom.    OBJECTIVE:  Otoscopic exam indicates ears are clear of cerumen bilaterally     Pure Tone Thresholds assessed using conventional audiometry with good  reliability from 250-8000 Hz bilaterally using insert earphones and circumaural headphones     RIGHT:  Normal hearing sensitivity sloping     LEFT:  Normal hearing sensitivity      Tympanogram:    RIGHT: normal eardrum mobility    LEFT:   normal eardrum mobility    Reflexes (reported by stimulus ear):  RIGHT: Ipsilateral is present at normal levels  RIGHT: Contralateral is present at normal levels  LEFT:   Ipsilateral is present at normal levels  LEFT:   Contralateral is present at elevated levels      Speech Reception Threshold:    RIGHT: 15 dB HL    LEFT:   10 dB HL  Word Recognition Score:     RIGHT: 100% at 50 dB HL using NU-6 recorded word list.    LEFT:   100% at 50 dB HL using NU-6 recorded word list.      ASSESSMENT:   Compared to patient's previous audiogram dated 11/28/22, hearing and word recognition are stable. Today s results were discussed with the patient in detail.     PLAN:   It is recommended that the patient monitor hearing if symptoms worsen.  Please call this clinic with questions regarding these results or recommendations.        Shahzad Philip, [facility]  Licensed Audiologist  [place] License #[id number]

## 2024-01-03 ENCOUNTER — E-VISIT (OUTPATIENT)
Dept: INTERNAL MEDICINE | Facility: CLINIC | Age: 69
End: 2024-01-03
Payer: COMMERCIAL

## 2024-01-03 DIAGNOSIS — U07.1 INFECTION DUE TO 2019 NOVEL CORONAVIRUS: Primary | ICD-10-CM

## 2024-01-03 PROCEDURE — 99421 OL DIG E/M SVC 5-10 MIN: CPT | Performed by: INTERNAL MEDICINE

## 2024-01-09 ENCOUNTER — TELEPHONE (OUTPATIENT)
Dept: INTERNAL MEDICINE | Facility: CLINIC | Age: 69
End: 2024-01-09
Payer: COMMERCIAL

## 2024-01-09 NOTE — TELEPHONE ENCOUNTER
COVID Positive/Requesting COVID treatment    Patient is positive for COVID and requesting treatment options.    Date of positive COVID test (PCR or at home)? 01/03/24 and 01/09/24 home test     Current COVID symptoms: fever or chills, cough, muscle or body aches, new loss of taste or smell, and congestion or runny nose    Date COVID symptoms began: 01/02/24    Message should be routed to clinic RN pool. Best phone number to use for call back: [phone]

## 2024-01-09 NOTE — TELEPHONE ENCOUNTER
RN closing encounter. Pt has been communicated with regarding window of Paxlovid treatment. Please see E-Visit encounter for more information.        Per standing order, pt would not qualify for treatment. Pt is day 7, with no history of qualifying factors. Please see standing order.   How many days since symptoms started? 6-7 days.   Is patient at least 16 years old? Yes  Does patient meet one of the following?   - RN is unsure if the patient qualifies based on the criteria provided. (See Paxlovid Standing Order Policy for full list)  No, patient informed they do not qualify for treatment.   Luisa Mcintyre RN

## 2024-01-16 ENCOUNTER — NURSE TRIAGE (OUTPATIENT)
Dept: NURSING | Facility: CLINIC | Age: 69
End: 2024-01-16
Payer: COMMERCIAL

## 2024-01-16 NOTE — TELEPHONE ENCOUNTER
On January 3 patient tested positive for Covid. Patient has not had any real symptoms for 8-9 days. Patient continues to test positive. Yesterday tested negative and then tested positive again today.   Patient asking if he needs to quarantine again. Informed that if he is not having symptoms he would not need to quarantine again. If symptoms return then yes, go through the isolation protocol again. Encouraged patient to continue masking as test is still positive and due to the amount of viral illness right now in the community.   Patient will call back if he develops symptoms or has more questions.  Ada Carter RN   01/16/24 8:25 AM  [facility] Nurse Advisor  Reason for Disposition   COVID-19 Testing, questions about   COVID-19 Home Isolation, questions about    Protocols used: Coronavirus (COVID-19) Diagnosed or [id number]-GI

## 2024-02-29 SDOH — HEALTH STABILITY: PHYSICAL HEALTH: ON AVERAGE, HOW MANY MINUTES DO YOU ENGAGE IN EXERCISE AT THIS LEVEL?: 30 MIN

## 2024-02-29 SDOH — HEALTH STABILITY: PHYSICAL HEALTH: ON AVERAGE, HOW MANY DAYS PER WEEK DO YOU ENGAGE IN MODERATE TO STRENUOUS EXERCISE (LIKE A BRISK WALK)?: 6 DAYS

## 2024-02-29 ASSESSMENT — SOCIAL DETERMINANTS OF HEALTH (SDOH): HOW OFTEN DO YOU GET TOGETHER WITH FRIENDS OR RELATIVES?: ONCE A WEEK

## 2024-03-06 ENCOUNTER — OFFICE VISIT (OUTPATIENT)
Dept: INTERNAL MEDICINE | Facility: CLINIC | Age: 69
End: 2024-03-06
Payer: COMMERCIAL

## 2024-03-06 VITALS
BODY MASS INDEX: 31.48 KG/M2 | RESPIRATION RATE: 14 BRPM | HEART RATE: 66 BPM | HEIGHT: 73 IN | DIASTOLIC BLOOD PRESSURE: 88 MMHG | TEMPERATURE: 98.3 F | SYSTOLIC BLOOD PRESSURE: 147 MMHG | WEIGHT: 237.5 LBS | OXYGEN SATURATION: 96 %

## 2024-03-06 DIAGNOSIS — E66.811 CLASS 1 OBESITY DUE TO EXCESS CALORIES WITHOUT SERIOUS COMORBIDITY IN ADULT, UNSPECIFIED BMI: ICD-10-CM

## 2024-03-06 DIAGNOSIS — E66.09 CLASS 1 OBESITY DUE TO EXCESS CALORIES WITHOUT SERIOUS COMORBIDITY IN ADULT, UNSPECIFIED BMI: ICD-10-CM

## 2024-03-06 DIAGNOSIS — Z12.5 SCREENING FOR PROSTATE CANCER: ICD-10-CM

## 2024-03-06 DIAGNOSIS — G47.33 OBSTRUCTIVE SLEEP APNEA SYNDROME: ICD-10-CM

## 2024-03-06 DIAGNOSIS — Z13.1 SCREENING FOR DIABETES MELLITUS: ICD-10-CM

## 2024-03-06 DIAGNOSIS — Z13.220 LIPID SCREENING: ICD-10-CM

## 2024-03-06 DIAGNOSIS — G47.33 OSA (OBSTRUCTIVE SLEEP APNEA): ICD-10-CM

## 2024-03-06 DIAGNOSIS — Z00.00 MEDICARE ANNUAL WELLNESS VISIT, SUBSEQUENT: Primary | ICD-10-CM

## 2024-03-06 PROBLEM — D36.9 ADENOMATOUS POLYP: Status: RESOLVED | Noted: 2022-05-02 | Resolved: 2024-03-06

## 2024-03-06 PROCEDURE — G0103 PSA SCREENING: HCPCS | Performed by: INTERNAL MEDICINE

## 2024-03-06 PROCEDURE — G0439 PPPS, SUBSEQ VISIT: HCPCS | Performed by: INTERNAL MEDICINE

## 2024-03-06 PROCEDURE — 36415 COLL VENOUS BLD VENIPUNCTURE: CPT | Performed by: INTERNAL MEDICINE

## 2024-03-06 PROCEDURE — 80053 COMPREHEN METABOLIC PANEL: CPT | Performed by: INTERNAL MEDICINE

## 2024-03-06 PROCEDURE — 80061 LIPID PANEL: CPT | Performed by: INTERNAL MEDICINE

## 2024-03-06 PROCEDURE — 99213 OFFICE O/P EST LOW 20 MIN: CPT | Mod: 25 | Performed by: INTERNAL MEDICINE

## 2024-03-06 ASSESSMENT — PAIN SCALES - GENERAL: PAINLEVEL: MILD PAIN (2)

## 2024-03-06 NOTE — PROGRESS NOTES
"Preventive Care Visit  [facility]  Rajiv Ludwig MD, Internal Medicine  Mar 6, 2024    Assessment & Plan     (Z00.00) Medicare annual wellness visit, subsequent  (primary encounter diagnosis)  Comment: stable  Plan: Age appropriate cognitive and physical abilities.  Independent      (E66.09) Class 1 obesity due to excess calories without serious comorbidity in adult, unspecified BMI  Comment: ongoing work  Plan: exercise, lifestyle modifications.    (G47.33) ADINA (obstructive sleep apnea)  Comment: uses CPAP. Mild overall  Plan: sees pulm     Elevated BP--has had 15 lbs weight gain. Some stress related to his son and daughter in law's fertility concerns though now they are pregnant. He will work on lifestyle modifications and then continue to monitor his BP at home these next few months. To reach out if he is regularly above 140    Psa ordered per preference.    See me again in a year            BMI  Estimated body mass index is 31.33 kg/m  as calculated from the following:    Height as of this encounter: 1.854 m (6' 1\").    Weight as of this encounter: 107.7 kg (237 lb 8 oz).       Counseling  Appropriate preventive services were discussed with this patient, including applicable screening as appropriate for fall prevention, nutrition, physical activity, Tobacco-use cessation, weight loss and cognition.  Checklist reviewing preventive services available has been given to the patient.  Reviewed patient's diet, addressing concerns and/or questions.   Patient reported safety concerns were addressed today.Information on urinary incontinence and treatment options given to patient.           Darryl Gonzales is a 68 year old, presenting for the following:  Recheck Medication and Physical (AWV)        3/6/2024     9:06 AM   Additional Questions   Roomed by shady jean   Accompanied by self             HPI  Pt is here for a well ness            2/29/2024   General Health   How would you rate your " overall physical health? Good   Feel stress (tense, anxious, or unable to sleep) To some extent   (!) STRESS CONCERN      2/29/2024   Nutrition   Diet: Low salt    Gluten-free/reduced         2/29/2024   Exercise   Days per week of moderate/strenous exercise 6 days   Average minutes spent exercising at this level 30 min         2/29/2024   Social Factors   Frequency of gathering with friends or relatives Once a week   Worry food won't last until get money to buy more No   Food not last or not have enough money for food? No   Do you have housing?  Yes   Are you worried about losing your housing? No   Lack of transportation? No   Unable to get utilities (heat,electricity)? No         2/29/2024   Fall Risk   Fallen 2 or more times in the past year? No    No   Trouble with walking or balance? No    No          2/29/2024   Activities of Daily Living- Home Safety   Needs help with the following daily activites None of the above   Safety concerns in the home No grab bars in the bathroom         2/29/2024   Dental   Dentist two times every year? Yes         2/29/2024   Hearing Screening   Hearing concerns? None of the above         2/29/2024   Driving Risk Screening   Patient/family members have concerns about driving No         2/29/2024   General Alertness/Fatigue Screening   Have you been more tired than usual lately? No         2/29/2024   Urinary Incontinence Screening   Bothered by leaking urine in past 6 months Yes         2/29/2024   TB Screening   Were you born outside of [place]?  No         Today's PHQ-2 Score:       3/5/2024    10:06 AM   PHQ-2 ( 1999 Pfizer)   Q1: Little interest or pleasure in doing things 0   Q2: Feeling down, depressed or hopeless 0   PHQ-2 Score 0   Q1: Little interest or pleasure in doing things Not at all   Q2: Feeling down, depressed or hopeless Not at all   PHQ-2 Score 0           2/29/2024   Substance Use   Alcohol more than 3/day or more than 7/wk No   Do you have a current opioid  prescription? No   How severe/bad is pain from 1 to 10? 2/10   Do you use any other substances recreationally? No     Social History     Tobacco Use    Smoking status: Never     Passive exposure: Past    Smokeless tobacco: Never   Vaping Use    Vaping Use: Never used   Substance Use Topics    Alcohol use: Yes     Comment: once/week    Drug use: No           2/29/2024   AAA Screening   Family history of Abdominal Aortic Aneurysm (AAA)? No   Last PSA:   PSA   Date Value Ref Range Status   08/14/2013 1.81 0 - 4 ug/L Final     Prostate Specific Antigen Screen   Date Value Ref Range Status   03/01/2023 2.38 0.00 - 4.50 ng/mL Final     ASCVD Risk   The 10-year ASCVD risk score (Francisco DK, et al., 2019) is: 15.3%    Values used to calculate the score:      Age: 68 years      Sex: Male      Is Non- : No      Diabetic: No      Tobacco smoker: No      Systolic Blood Pressure: 147 mmHg      Is BP treated: No      HDL Cholesterol: 48 mg/dL      Total Cholesterol: 142 mg/dL            Reviewed and updated as needed this visit by Provider                      Current providers sharing in care for this patient include:  Patient Care Team:  Rajiv Ludwig MD as PCP - General (Internal Medicine)  Costa Mayer MD as Assigned Sleep Provider  Rajiv Ludwig MD as Assigned PCP  Ana Luisa Carlos MD as Assigned Pulmonology Provider  Steven Reid MD as MD (Otolaryngology)  Sonal Sauceda AuD as Audiologist (Audiology)  Steven Reid MD as Assigned Surgical Provider    The following health maintenance items are reviewed in Epic and correct as of today:  Health Maintenance   Topic Date Due    ANNUAL REVIEW OF HM ORDERS  03/01/2024    MEDICARE ANNUAL WELLNESS VISIT  03/01/2024    GLUCOSE  02/16/2025    FALL RISK ASSESSMENT  03/06/2025    LIPID  02/16/2027    COLORECTAL CANCER SCREENING  07/05/2027    DTAP/TDAP/TD IMMUNIZATION (3 - Td or Tdap) 07/07/2027    ADVANCE CARE PLANNING  03/05/2028     "HEPATITIS C SCREENING  Completed    PHQ-2 (once per calendar year)  Completed    INFLUENZA VACCINE  Completed    Pneumococcal Vaccine: 65+ Years  Completed    ZOSTER IMMUNIZATION  Completed    RSV VACCINE (Pregnancy & 60+)  Completed    COVID-19 Vaccine  Completed    IPV IMMUNIZATION  Aged Out    HPV IMMUNIZATION  Aged Out    MENINGITIS IMMUNIZATION  Aged Out    RSV MONOCLONAL ANTIBODY  Aged Out            Objective    Exam  BP (!) 147/88 (BP Location: Left arm, Patient Position: Sitting, Cuff Size: Adult Large)   Pulse 66   Temp 98.3  F (36.8  C)   Resp 14   Ht 1.854 m (6' 1\")   Wt 107.7 kg (237 lb 8 oz)   SpO2 96%   BMI 31.33 kg/m     Estimated body mass index is 31.33 kg/m  as calculated from the following:    Height as of this encounter: 1.854 m (6' 1\").    Weight as of this encounter: 107.7 kg (237 lb 8 oz).    Physical Exam  GENERAL: alert and no distress  EYES: Eyes grossly normal to inspection, PERRL and conjunctivae and sclerae normal  HENT: ear canals and TM's normal, nose and mouth without ulcers or lesions  NECK: no adenopathy, no asymmetry, masses, or scars  RESP: lungs clear to auscultation - no rales, rhonchi or wheezes  CV: regular rate and rhythm, normal S1 S2, no S3 or S4, no murmur, click or rub, no peripheral edema  ABDOMEN: soft, nontender, no hepatosplenomegaly, no masses and bowel sounds normal  MS: no gross musculoskeletal defects noted, no edema  SKIN: no suspicious lesions or rashes  NEURO: Normal strength and tone, mentation intact and speech normal  PSYCH: mentation appears normal, affect normal/bright        3/6/2024   Mini Cog   Clock Draw Score 2 Normal   3 Item Recall 3 objects recalled   Mini Cog Total Score 5            Signed Electronically by: Rajiv Ludwig MD    "

## 2024-03-07 LAB
ALBUMIN SERPL BCG-MCNC: 4.8 G/DL (ref 3.5–5.2)
ALP SERPL-CCNC: 56 U/L (ref 40–150)
ALT SERPL W P-5'-P-CCNC: 17 U/L (ref 0–70)
ANION GAP SERPL CALCULATED.3IONS-SCNC: 12 MMOL/L (ref 7–15)
AST SERPL W P-5'-P-CCNC: 27 U/L (ref 0–45)
BILIRUB SERPL-MCNC: 0.8 MG/DL
BUN SERPL-MCNC: 16.1 MG/DL (ref 8–23)
CALCIUM SERPL-MCNC: 9.8 MG/DL (ref 8.8–10.2)
CHLORIDE SERPL-SCNC: 102 MMOL/L (ref 98–107)
CHOLEST SERPL-MCNC: 153 MG/DL
CREAT SERPL-MCNC: 0.9 MG/DL (ref 0.67–1.17)
DEPRECATED HCO3 PLAS-SCNC: 26 MMOL/L (ref 22–29)
EGFRCR SERPLBLD CKD-EPI 2021: >90 ML/MIN/1.73M2
FASTING STATUS PATIENT QL REPORTED: NO
GLUCOSE SERPL-MCNC: 107 MG/DL (ref 70–99)
HDLC SERPL-MCNC: 52 MG/DL
LDLC SERPL CALC-MCNC: 82 MG/DL
NONHDLC SERPL-MCNC: 101 MG/DL
POTASSIUM SERPL-SCNC: 4.5 MMOL/L (ref 3.4–5.3)
PROT SERPL-MCNC: 7.7 G/DL (ref 6.4–8.3)
PSA SERPL DL<=0.01 NG/ML-MCNC: 2.44 NG/ML (ref 0–4.5)
SODIUM SERPL-SCNC: 140 MMOL/L (ref 135–145)
TRIGL SERPL-MCNC: 97 MG/DL

## 2024-05-01 ENCOUNTER — OFFICE VISIT (OUTPATIENT)
Dept: OTOLARYNGOLOGY | Facility: CLINIC | Age: 69
End: 2024-05-01
Payer: COMMERCIAL

## 2024-05-01 VITALS
HEIGHT: 73 IN | BODY MASS INDEX: 32.11 KG/M2 | HEART RATE: 67 BPM | WEIGHT: 242.3 LBS | OXYGEN SATURATION: 97 % | DIASTOLIC BLOOD PRESSURE: 89 MMHG | SYSTOLIC BLOOD PRESSURE: 148 MMHG

## 2024-05-01 DIAGNOSIS — D36.9 INVERTED PAPILLOMA: Primary | ICD-10-CM

## 2024-05-01 PROCEDURE — 99212 OFFICE O/P EST SF 10 MIN: CPT | Mod: 25 | Performed by: STUDENT IN AN ORGANIZED HEALTH CARE EDUCATION/TRAINING PROGRAM

## 2024-05-01 PROCEDURE — 31231 NASAL ENDOSCOPY DX: CPT | Performed by: STUDENT IN AN ORGANIZED HEALTH CARE EDUCATION/TRAINING PROGRAM

## 2024-05-01 ASSESSMENT — PAIN SCALES - GENERAL: PAINLEVEL: NO PAIN (1)

## 2024-05-01 NOTE — PATIENT INSTRUCTIONS
You were seen in the ENT Clinic today by Dr. Reid. If you have any questions or concerns after your appointment, please contact us (see below)         2.   Plan to return to the ENT clinic in 6 months           How to Contact Us:  Send a [employer] message to your provider. Our team will respond to you via [employer]. Occasionally, we will need to call you to get further information.  For urgent matters (Monday-Friday), call the ENT Clinic: [phone] and speak with a call center team member - they will route your call appropriately.   If you'd like to speak directly with a nurse, please find our contact information below. We do our best to check voicemail frequently throughout the day, and will work to call you back within 1-2 days. For urgent matters, please use the general clinic phone numbers listed above.     Anupama ARAUJO RN  Direct: [phone]  Amy GIBSON LPN  Direct: [phone]         [facility]  Department of Otolaryngology

## 2024-05-01 NOTE — PROGRESS NOTES
"  [facility]  Return Visit  Encounter date:   May 1, 2024    Chief Complaint: follow-up    ID: inverted papilloma s/p as below 2/13/22     Interval History:   Humberto Pino is a 68 year old male who presents for follow up inverted papilloma s/p as below 2/13/22.    10/26/23: Humberto Pino is a 67 year old male who presents for follow up. No new issues. Very happy with his care     Today, he reports that he COVID around January 2nd with mild symptoms such as fatigue and gained 15 lbs, but reports no other big changes since his last visit.    [facility]  Procedure Date: 02/13/2023     PREOPERATIVE DIAGNOSES:     1.  Left chronic maxillary and ethmoid sinusitis.  2.  Right maxillary sinus inverted papilloma.  3.  Chronic nasal congestion and eustachian tube dysfunction secondary to above.     POSTOPERATIVE DIAGNOSES:     1.  Left chronic maxillary and ethmoid sinusitis.  2.  Right maxillary sinus inverted papilloma.  3.  Chronic nasal congestion and eustachian tube dysfunction secondary to above.     PROCEDURE PERFORMED:    1.  Left endoscopic maxillary antrostomy.  2.  Left endoscopic partial ethmoidectomy.  3.  Right endoscopic modified medial maxillectomy via endoscopic Denker's approach for resection of the maxillary sinus tumor, likely inverted papilloma - modifier 22.  4.  Computerized image guidance, extradural.    Review of systems: A 14-point review of systems has been conducted and is negative for any notable symptoms, except as dictated in the history of present illness.     Physical Exam:  Vital signs: BP (!) 148/89   Pulse 67   Ht 1.842 m (6' 0.5\")   Wt 109.9 kg (242 lb 4.8 oz)   SpO2 97%   BMI 32.41 kg/m     General Appearance: No acute distress, appropriate demeanor, conversant  Eyes: moist conjunctivae; EOMI; pupils symmetric; visual acuity grossly intact; no proptosis  Head: normocephalic; overall symmetric appearance without deformity  Face: overall symmetric without " deformity  Ears: Normal appearance of external ear  Nose: No external deformity  Oral Cavity/oropharynx: Normal appearance of mucosa  Neck: no palpable lymphadenopathy; thyroid without palpable nodules  Lungs: symmetric chest rise; no wheezing  CV: Good distal perfusion; normal hear rate  Extremities: No deformity  Neurologic Exam: Cranial nerves II-XII are grossly intact    Procedure Note  Procedure performed: Rigid nasal endoscopy  Indication: To evaluate for sinonasal pathology not visualized on routine anterior rhinoscopy  Anesthesia: 4% topical lidocaine with 0.05 % oxymetazoline  Description of procedure: A 30 degree, 3 mm rigid endoscope was inserted into bilateral nasal cavities and the nasal valves, nasal cavity, middle meatus, sphenoethmoid recess, nasopharynx were evaluated for evidence of obstruction, edema, purulence, polyps and/or mass/lesion.     [place]t-Nestor Endoscopic Scoring System  Endoscopic observation Right Left   Polyps in middle meatus (0 = absent, 1 = restricted to middle meatus, 2 = Beyond middle meatus) 0 0   Discharge (0 = absent, 1 = thin and clear, 2 = thick, purulent) 0 0   Edema (0 = absent, 1 = mild-moderate, 2 = moderate-severe) 0 0   Crusting (0 = absent, 1 = mild-moderate, 2 = moderate-severe) 0 0   Scarring (0= absent, 1 = mild-moderate, 2 = moderate-severe) 1 0   Total 1 0     Findings  RT: maxillary sinus clear with no signs of infection or residual tumor   LT: maxillary sinus clear with no signs of infection     The patient tolerated the procedure well without complication.     Imaging Review:  CT FACIAL BONES WITHOUT CONTRAST 1/30/2023   IMPRESSION:  1. Not substantially changed presumed right inverted papilloma with extension into the right nasal cavity and nasopharynx compared to 11/28/2022.  2. Not substantially changed partial opacification and mucosal thickening of the left maxillary sinus.    Pathology Review:  Final Diagnosis 12/7/23  A. RIGHT NASAL CAVITY, BIOPSY:  -  Fragments of sinonasal (schneiderian) papilloma, inverted type  - No evidence of high grade dysplasia or malignancy    Final Diagnosis 2/13/23  A. MAXILLARY SINUS, RIGHT SINONASAL TUMOR, EXCISION:  -Inverted sinonasal (Schneiderian) papilloma with acute inflammation and reactive changes, negative for high-grade  dysplasia or malignancy.    Assessment/Medical Decision Making:  Humberto Pino is a 68 year old male with inverted papilloma s/p as below 2/13/22. His right nostril shows no signs of new tumor growth. Questions and concerns were addressed.    Plan:  Follow-up in 6 months or sooner as needed    Scribe Disclosure:   I, Nelli Smith, am serving as a scribe; to document services personally performed by Steven Reid MD -based on data collection and the provider's statements to me.     Provider Disclosure:  I agree with above History, Review of Systems, Physical exam and Plan.  I have reviewed the content of the documentation and have edited it as needed. I have personally performed the services documented here and the documentation accurately represents those services and the decisions I have made.      Electronically signed by:    Steven Reid MD    [facility]  Rhinology, Endoscopic Skull Base Surgery  [facility]  Department of Otolaryngology - Head & Neck Surgery    ~~~~~~~~~~~~~~~~~~~~~~~~~~~~~~~~~~~~~~~~~~~~~~~~~~~~~~~~~~~~~~~~~~~~~~~~~~~~~~~~~~~~~~~~~~~~~~~~~~~~~~~~~~~~~~~~~~~~~~~~~~~~~~~~~~~~~~~    Past Medical History:   Diagnosis Date    Arthritis 01/01/2016    Chronic maxillary sinusitis     Diverticulosis of colon (without mention of hemorrhage)     repeat 2012    ADINA (obstructive sleep apnea)     Tinnitus         Past Surgical History:   Procedure Laterality Date    ARTHROSCOPY KNEE  08/04/2011    Procedure:ARTHROSCOPY KNEE; With debridement of medial and lateral meniscus  ; Surgeon:AMARILIS PETERS; [facility]    ENDOSCOPIC ENDONASAL SURGERY Left  2/13/2023    Procedure: LEFT ENDOSCOPIC IMAGE-GUIDED MAXILLARY ANTROSTOMY WITH TISSUE REMOVAL;  Surgeon: Chet Cornejo MD;  Location: [facility] OR    HERNIA REPAIR, UMBILICAL      OPTICAL TRACKING SYSTEM ENDOSCOPIC SINUS SURGERY Right 2/13/2023    Procedure: RIGHT ENDOSCOPIC, IMAGE-GUIDED MEDIAL MAXILLECTOMY VIA DENKER'S APPROACH;  Surgeon: Chet Cornejo MD;  Location: [facility] OR    right thumb, index finger, middle finger and ring finger trigger release  2021    TOE SURGERY      feb 2006    ZZC REPAIR CRUCIATE LIGAMENT,KNEE      both knees-ACL        Family History   Problem Relation Age of Onset    Arthritis Mother         osteoporosis    Respiratory Mother         on oxygen-was smoker, COPD    Osteoporosis Mother         significant problem    Cerebrovascular Disease Father         Age 85-87    C.A.D. Father         Rapid heart rates    Other - See Comments Father 62        petuitary tumor    Cerebrovascular Disease Paternal Grandmother         Age about 80    Arthritis Paternal Grandmother     Cerebrovascular Disease Paternal Grandfather         age 94    Anesthesia Reaction No family hx of     Venous thrombosis No family hx of         Social History     Socioeconomic History    Marital status:      Spouse name: Manasa    Number of children: 2    Years of education: 23    Highest education level: None   Occupational History    Occupation: teacher     Employer: [employer]   Tobacco Use    Smoking status: Never     Passive exposure: Past    Smokeless tobacco: Never   Vaping Use    Vaping status: Never Used   Substance and Sexual Activity    Alcohol use: Yes     Comment: once/week    Drug use: No    Sexual activity: Yes     Partners: Female     Birth control/protection: None     Comment: none needed   Other Topics Concern    Parent/sibling w/ CABG, MI or angioplasty before 65F 55M? No   Social History Narrative    Dairy/d 2-3 servings/d.     Caffeine 0-3 servings/d    Exercise 3-4 x week     Sunscreen used - Yes    Seatbelts used - Yes    Working smoke/CO detectors in the home - Yes    Guns stored in the home - No    Self Breast Exams - NOT APPLICABLE    Self Testicular Exam - Yes    Eye Exam up to date - Yes    Dental Exam up to date - Yes    Pap Smear up to date - NOT APPLICABLE    Mammogram up to date - NOT APPLICABLE    PSA up to date - NOT APPLICABLE    Dexa Scan up to date - NOT APPLICABLE    Flex Sig / Colonoscopy up to date - Yes    Immunizations up to date - Yes    Abuse: Current or Past(Physical, Sexual or Emotional)- No    Do you feel safe in your environment - Yes        07/2010                 Social Determinants of Health     Financial Resource Strain: Low Risk  (2/29/2024)    Financial Resource Strain     Within the past 12 months, have you or your family members you live with been unable to get utilities (heat, electricity) when it was really needed?: No   Food Insecurity: Low Risk  (2/29/2024)    Food Insecurity     Within the past 12 months, did you worry that your food would run out before you got money to buy more?: No     Within the past 12 months, did the food you bought just not last and you didn t have money to get more?: No   Transportation Needs: Low Risk  (2/29/2024)    Transportation Needs     Within the past 12 months, has lack of transportation kept you from medical appointments, getting your medicines, non-medical meetings or appointments, work, or from getting things that you need?: No   Physical Activity: Sufficiently Active (2/29/2024)    Exercise Vital Sign     Days of Exercise per Week: 6 days     Minutes of Exercise per Session: 30 min   Stress: Stress Concern Present (2/29/2024)    [employer] - Occupational Stress Questionnaire     Feeling of Stress : To some extent   Social Connections: Unknown (2/29/2024)    Social Connection and Isolation Panel [NHANES]     Frequency of Social Gatherings with Friends and Family: Once a week    Interpersonal Safety: Low Risk  (3/6/2024)    Interpersonal Safety     Do you feel physically and emotionally safe where you currently live?: Yes     Within the past 12 months, have you been hit, slapped, kicked or otherwise physically hurt by someone?: No     Within the past 12 months, have you been humiliated or emotionally abused in other ways by your partner or ex-partner?: No   Housing Stability: Low Risk  (2/29/2024)    Housing Stability     Do you have housing? : Yes     Are you worried about losing your housing?: No

## 2024-05-01 NOTE — LETTER
"5/1/2024       RE: Humberto Pino  [address]  [place] [address]     Dear Colleague,    Thank you for referring your patient, Humberto Pino, to the [facility] [place] [facility]. Please see a copy of my visit note below.      [facility]  Return Visit  Encounter date:   May 1, 2024    Chief Complaint: follow-up    ID: inverted papilloma s/p as below 2/13/22     Interval History:   Humberto Pino is a 68 year old male who presents for follow up inverted papilloma s/p as below 2/13/22.    10/26/23: Humberto Pino is a 67 year old male who presents for follow up. No new issues. Very happy with his care     Today, he reports that he COVID around January 2nd with mild symptoms such as fatigue and gained 15 lbs, but reports no other big changes since his last visit.    [place] [facility]  Procedure Date: 02/13/2023     PREOPERATIVE DIAGNOSES:     1.  Left chronic maxillary and ethmoid sinusitis.  2.  Right maxillary sinus inverted papilloma.  3.  Chronic nasal congestion and eustachian tube dysfunction secondary to above.     POSTOPERATIVE DIAGNOSES:     1.  Left chronic maxillary and ethmoid sinusitis.  2.  Right maxillary sinus inverted papilloma.  3.  Chronic nasal congestion and eustachian tube dysfunction secondary to above.     PROCEDURE PERFORMED:    1.  Left endoscopic maxillary antrostomy.  2.  Left endoscopic partial ethmoidectomy.  3.  Right endoscopic modified medial maxillectomy via endoscopic Denker's approach for resection of the maxillary sinus tumor, likely inverted papilloma - modifier 22.  4.  Computerized image guidance, extradural.    Review of systems: A 14-point review of systems has been conducted and is negative for any notable symptoms, except as dictated in the history of present illness.     Physical Exam:  Vital signs: BP (!) 148/89   Pulse 67   Ht 1.842 m (6' 0.5\")   Wt " 109.9 kg (242 lb 4.8 oz)   SpO2 97%   BMI 32.41 kg/m     General Appearance: No acute distress, appropriate demeanor, conversant  Eyes: moist conjunctivae; EOMI; pupils symmetric; visual acuity grossly intact; no proptosis  Head: normocephalic; overall symmetric appearance without deformity  Face: overall symmetric without deformity  Ears: Normal appearance of external ear  Nose: No external deformity  Oral Cavity/oropharynx: Normal appearance of mucosa  Neck: no palpable lymphadenopathy; thyroid without palpable nodules  Lungs: symmetric chest rise; no wheezing  CV: Good distal perfusion; normal hear rate  Extremities: No deformity  Neurologic Exam: Cranial nerves II-XII are grossly intact    Procedure Note  Procedure performed: Rigid nasal endoscopy  Indication: To evaluate for sinonasal pathology not visualized on routine anterior rhinoscopy  Anesthesia: 4% topical lidocaine with 0.05 % oxymetazoline  Description of procedure: A 30 degree, 3 mm rigid endoscope was inserted into bilateral nasal cavities and the nasal valves, nasal cavity, middle meatus, sphenoethmoid recess, nasopharynx were evaluated for evidence of obstruction, edema, purulence, polyps and/or mass/lesion.     Joey-Nestor Endoscopic Scoring System  Endoscopic observation Right Left   Polyps in middle meatus (0 = absent, 1 = restricted to middle meatus, 2 = Beyond middle meatus) 0 0   Discharge (0 = absent, 1 = thin and clear, 2 = thick, purulent) 0 0   Edema (0 = absent, 1 = mild-moderate, 2 = moderate-severe) 0 0   Crusting (0 = absent, 1 = mild-moderate, 2 = moderate-severe) 0 0   Scarring (0= absent, 1 = mild-moderate, 2 = moderate-severe) 1 0   Total 1 0     Findings  RT: maxillary sinus clear with no signs of infection or residual tumor   LT: maxillary sinus clear with no signs of infection     The patient tolerated the procedure well without complication.     Imaging Review:  CT FACIAL BONES WITHOUT CONTRAST 1/30/2023   IMPRESSION:  1.  Not substantially changed presumed right inverted papilloma with extension into the right nasal cavity and nasopharynx compared to 11/28/2022.  2. Not substantially changed partial opacification and mucosal thickening of the left maxillary sinus.    Pathology Review:  Final Diagnosis 12/7/23  A. RIGHT NASAL CAVITY, BIOPSY:  - Fragments of sinonasal (schneiderian) papilloma, inverted type  - No evidence of high grade dysplasia or malignancy    Final Diagnosis 2/13/23  A. MAXILLARY SINUS, RIGHT SINONASAL TUMOR, EXCISION:  -Inverted sinonasal (Schneiderian) papilloma with acute inflammation and reactive changes, negative for high-grade  dysplasia or malignancy.    Assessment/Medical Decision Making:  Humberto Pino is a 68 year old male with inverted papilloma s/p as below 2/13/22. His right nostril shows no signs of new tumor growth. Questions and concerns were addressed.    Plan:  Follow-up in 6 months or sooner as needed    Scribe Disclosure:   I, Nelli Smith, am serving as a scribe; to document services personally performed by Steven Reid MD -based on data collection and the provider's statements to me.     Provider Disclosure:  I agree with above History, Review of Systems, Physical exam and Plan.  I have reviewed the content of the documentation and have edited it as needed. I have personally performed the services documented here and the documentation accurately represents those services and the decisions I have made.      Electronically signed by:    Steven Reid MD    [facility]  Rhinology, Endoscopic Skull Base Surgery  [facility]  Department of Otolaryngology - Head & Neck Surgery    ~~~~~~~~~~~~~~~~~~~~~~~~~~~~~~~~~~~~~~~~~~~~~~~~~~~~~~~~~~~~~~~~~~~~~~~~~~~~~~~~~~~~~~~~~~~~~~~~~~~~~~~~~~~~~~~~~~~~~~~~~~~~~~~~~~~~~~~    Past Medical History:   Diagnosis Date     Arthritis 01/01/2016     Chronic maxillary sinusitis      Diverticulosis of colon (without mention of  hemorrhage)     repeat 2012     ADINA (obstructive sleep apnea)      Tinnitus         Past Surgical History:   Procedure Laterality Date     ARTHROSCOPY KNEE  08/04/2011    Procedure:ARTHROSCOPY KNEE; With debridement of medial and lateral meniscus  ; Surgeon:AMARILIS PETERS; [facility]     ENDOSCOPIC ENDONASAL SURGERY Left 2/13/2023    Procedure: LEFT ENDOSCOPIC IMAGE-GUIDED MAXILLARY ANTROSTOMY WITH TISSUE REMOVAL;  Surgeon: Chet Cornejo MD;  Location: [facility] [place]     HERNIA REPAIR, UMBILICAL       OPTICAL TRACKING SYSTEM ENDOSCOPIC SINUS SURGERY Right 2/13/2023    Procedure: RIGHT ENDOSCOPIC, IMAGE-GUIDED MEDIAL MAXILLECTOMY VIA DENKER'S APPROACH;  Surgeon: Chet Cornejo MD;  Location: [facility] OR     right thumb, index finger, middle finger and ring finger trigger release  2021     TOE SURGERY      feb 2006     ZZC REPAIR CRUCIATE LIGAMENT,KNEE      both knees-ACL        Family History   Problem Relation Age of Onset     Arthritis Mother         osteoporosis     Respiratory Mother         on oxygen-was smoker, COPD     Osteoporosis Mother         significant problem     Cerebrovascular Disease Father         Age 85-87     C.A.D. Father         Rapid heart rates     Other - See Comments Father 62        petuitary tumor     Cerebrovascular Disease Paternal Grandmother         Age about 80     Arthritis Paternal Grandmother      Cerebrovascular Disease Paternal Grandfather         age 94     Anesthesia Reaction No family hx of      Venous thrombosis No family hx of         Social History     Socioeconomic History     Marital status:      Spouse name: Manasa     Number of children: 2     Years of education: 23     Highest education level: None   Occupational History     Occupation: teacher     Employer: [email or web address]   Tobacco Use     Smoking status: Never     Passive exposure: Past     Smokeless tobacco: Never   Vaping Use     Vaping status: Never Used   Substance and Sexual Activity      Alcohol use: Yes     Comment: once/week     Drug use: No     Sexual activity: Yes     Partners: Female     Birth control/protection: None     Comment: none needed   Other Topics Concern     Parent/sibling w/ CABG, MI or angioplasty before 65F 55M? No   Social History Narrative    Dairy/d 2-3 servings/d.     Caffeine 0-3 servings/d    Exercise 3-4 x week    Sunscreen used - Yes    Seatbelts used - Yes    Working smoke/CO detectors in the home - Yes    Guns stored in the home - No    Self Breast Exams - NOT APPLICABLE    Self Testicular Exam - Yes    Eye Exam up to date - Yes    Dental Exam up to date - Yes    Pap Smear up to date - NOT APPLICABLE    Mammogram up to date - NOT APPLICABLE    PSA up to date - NOT APPLICABLE    Dexa Scan up to date - NOT APPLICABLE    Flex Sig / Colonoscopy up to date - Yes    Immunizations up to date - Yes    Abuse: Current or Past(Physical, Sexual or Emotional)- No    Do you feel safe in your environment - Yes        07/2010                 Social Determinants of Health     Financial Resource Strain: Low Risk  (2/29/2024)    Financial Resource Strain      Within the past 12 months, have you or your family members you live with been unable to get utilities (heat, electricity) when it was really needed?: No   Food Insecurity: Low Risk  (2/29/2024)    Food Insecurity      Within the past 12 months, did you worry that your food would run out before you got money to buy more?: No      Within the past 12 months, did the food you bought just not last and you didn t have money to get more?: No   Transportation Needs: Low Risk  (2/29/2024)    Transportation Needs      Within the past 12 months, has lack of transportation kept you from medical appointments, getting your medicines, non-medical meetings or appointments, work, or from getting things that you need?: No   Physical Activity: Sufficiently Active (2/29/2024)    Exercise Vital Sign      Days of Exercise per Week: 6 days      Minutes of  Exercise per Session: 30 min   Stress: Stress Concern Present (2/29/2024)    [employer] - Occupational Stress Questionnaire      Feeling of Stress : To some extent   Social Connections: Unknown (2/29/2024)    Social Connection and Isolation Panel [NHANES]      Frequency of Social Gatherings with Friends and Family: Once a week   Interpersonal Safety: Low Risk  (3/6/2024)    Interpersonal Safety      Do you feel physically and emotionally safe where you currently live?: Yes      Within the past 12 months, have you been hit, slapped, kicked or otherwise physically hurt by someone?: No      Within the past 12 months, have you been humiliated or emotionally abused in other ways by your partner or ex-partner?: No   Housing Stability: Low Risk  (2/29/2024)    Housing Stability      Do you have housing? : Yes      Are you worried about losing your housing?: No          Again, thank you for allowing me to participate in the care of your patient.      Sincerely,    Steven Reid MD

## 2024-05-09 ENCOUNTER — DOCUMENTATION ONLY (OUTPATIENT)
Dept: OTHER | Facility: CLINIC | Age: 69
End: 2024-05-09
Payer: COMMERCIAL

## 2024-08-20 ENCOUNTER — MYC MEDICAL ADVICE (OUTPATIENT)
Dept: INTERNAL MEDICINE | Facility: CLINIC | Age: 69
End: 2024-08-20
Payer: COMMERCIAL

## 2024-08-20 DIAGNOSIS — M79.672 LEFT FOOT PAIN: Primary | ICD-10-CM

## 2024-08-28 ENCOUNTER — MYC MEDICAL ADVICE (OUTPATIENT)
Dept: INTERNAL MEDICINE | Facility: CLINIC | Age: 69
End: 2024-08-28
Payer: COMMERCIAL

## 2024-09-03 ENCOUNTER — OFFICE VISIT (OUTPATIENT)
Dept: SLEEP MEDICINE | Facility: CLINIC | Age: 69
End: 2024-09-03
Payer: COMMERCIAL

## 2024-09-03 VITALS — OXYGEN SATURATION: 94 % | SYSTOLIC BLOOD PRESSURE: 158 MMHG | DIASTOLIC BLOOD PRESSURE: 93 MMHG | HEART RATE: 57 BPM

## 2024-09-03 DIAGNOSIS — G47.33 OSA (OBSTRUCTIVE SLEEP APNEA): Primary | ICD-10-CM

## 2024-09-03 PROCEDURE — 99213 OFFICE O/P EST LOW 20 MIN: CPT | Performed by: INTERNAL MEDICINE

## 2024-09-03 NOTE — PROGRESS NOTES
Obstructive Sleep Apnea - PAP Follow-Up Visit:    Chief Complaint   Patient presents with    Annual Visit       Humberto Pino comes in today for follow-up of their mild sleep apnea, managed with CPAP.     ADINA was diagnosed at [facility] sleep clinic in 2008 and showed a baseline AHI of 5.1 per hour. He has been on PAP therapy since then,.      Do you use a CPAP Machine at home: Yes  Overall, on a scale of 0-10 how would you rate your CPAP (0 poor, 10 great): 9  Is your mask comfortable: Yes  If not, why:    Is you mask leaking: No  If yes, where do you feel it:    How many night per week does the mask leak (0-7):    Do you notice snoring with mask on: No  Do you notice gasping arousals with mask on: No  Are you having significant oral or nasal dryness: No  Is the pressure setting comfortable: Yes  In not, why:    What type of mask do you use: Full Face Mask  What is your typical bedtime: 815 pm  How long does it take you to go to sleep on PAP therapy: 10 minutes  What time do you typically get out of bed for the day: 6 am  How many hours on average per night are you using PAP therapy: 9.5  How many hours are you sleeping per night: 9  Do you feel well rested in the morning: Yes    ResMed     Auto-PAP 5.0 - 18.0 cmH2O 30 day usage data:    96% of days with > 4 hours of use. 1/30 days with no use.   Average use 541 minutes per day.   95%ile Leak 0.12 L/min.   CPAP 95% pressure 14.7 cm.   AHI 1.01 events per hour.     EPWORTH SLEEPINESS SCALE         8/30/2024     9:38 AM    Jerome Sleepiness Scale ( ZHOU Starr  [id number]<br>ESS - [place]/English - Final version - 21 Nov 07 - [facility] [place].)   Sitting and reading Slight chance of dozing   Watching TV Slight chance of dozing   Sitting, inactive in a public place (e.g. a theatre or a meeting) Would never doze   As a passenger in a car for an hour without a break Slight chance of dozing   Lying down to rest in the afternoon when circumstances permit Slight  chance of dozing   Sitting and talking to someone Would never doze   Sitting quietly after a lunch without alcohol Slight chance of dozing   In a car, while stopped for a few minutes in traffic Would never doze   [place] Score (MC) 5   [place] Score (Sleep) 5       INSOMNIA SEVERITY INDEX (KENDALL)          8/30/2024     9:34 AM   Insomnia Severity Index (KENDALL)   Difficulty falling asleep 0   Difficulty staying asleep 1   Problems waking up too early 1   How SATISFIED/DISSATISFIED are you with your CURRENT sleep pattern? 1   How NOTICEABLE to others do you think your sleep problem is in terms of impairing the quality of your life? 1   How WORRIED/DISTRESSED are you about your current sleep problem? 0   To what extent do you consider your sleep problem to INTERFERE with your daily functioning (e.g. daytime fatigue, mood, ability to function at work/daily chores, concentration, memory, mood, etc.) CURRENTLY? 1   KENDALL Total Score 5       Guidelines for Scoring/Interpretation:  Total score categories:  0-7 = No clinically significant insomnia   8-14 = Subthreshold insomnia   15-21 = Clinical insomnia (moderate severity)  22-28 = Clinical insomnia (severe)  Used via courtesy of www.[employer]ritic[employer].va.gov with permission from Shmuel Dela Cruz PhD., [facility]    BP (!) 160/98   Pulse 57   SpO2 94%     Impression/Plan:     Mild sleep apnea.     - Patient is using CPAP regularly and continuing to benefit from therapy. I reviewed download data and graphs from his device for last 30 days. Regular compliance and normal resiudal AHI is demonstrated, confirming adequate treatment of sleep apnea.     Plan:     Continue auto PAP therapy 5-15 cm H2O    Humberto Pino will follow up in about 1 year(s).     I am the single focal point of care for a condition that requires longitudinal relationship and personalized care for condition(s) specified within this medical record.     Electronically signed by Dr. Costa Mayer, Diplomate, Sleep  Medicine, ABPN       CC:  Rajiv Ludwig,

## 2024-09-03 NOTE — NURSING NOTE
Christian to follow up with Primary Care provider regarding elevated blood pressure.  Gwendolyn Phillips CMA on 9/3/2024 at 8:59 AM

## 2024-09-04 ENCOUNTER — OFFICE VISIT (OUTPATIENT)
Dept: PODIATRY | Facility: CLINIC | Age: 69
End: 2024-09-04
Attending: INTERNAL MEDICINE
Payer: COMMERCIAL

## 2024-09-04 ENCOUNTER — ANCILLARY PROCEDURE (OUTPATIENT)
Dept: GENERAL RADIOLOGY | Facility: CLINIC | Age: 69
End: 2024-09-04
Attending: PODIATRIST
Payer: COMMERCIAL

## 2024-09-04 VITALS — WEIGHT: 235 LBS | BODY MASS INDEX: 31.43 KG/M2 | OXYGEN SATURATION: 94 % | HEART RATE: 71 BPM

## 2024-09-04 DIAGNOSIS — M77.32 BONE SPUR OF POSTERIOR PORTION OF LEFT CALCANEUS: Primary | ICD-10-CM

## 2024-09-04 DIAGNOSIS — M79.672 LEFT FOOT PAIN: ICD-10-CM

## 2024-09-04 PROCEDURE — 73630 X-RAY EXAM OF FOOT: CPT | Mod: LT | Performed by: PODIATRIST

## 2024-09-04 PROCEDURE — 99203 OFFICE O/P NEW LOW 30 MIN: CPT | Performed by: PODIATRIST

## 2024-09-04 ASSESSMENT — PAIN SCALES - GENERAL: PAINLEVEL: MILD PAIN (3)

## 2024-09-04 NOTE — PROGRESS NOTES
FOOT AND ANKLE SURGERY/PODIATRY CONSULT NOTE         ASSESSMENT:   Left heel pain  Retrocalcaneal spur left      TREATMENT:  An x-ray of the left foot was taken today.  The x-rays were read and interpreted by this physician.  I informed the patient that he does have a large hyperostosis on the posterior aspect of the calcaneus which is the source of his pain.  I told the patient typically bone spur at the side require surgical intervention.  I have recommended excision of the retrocalcaneal spur to alleviate the patient's pain.  He was told this procedure is performed on an outpatient basis under general anesthesia.  He was told the procedure takes approximately 30 minutes to perform.  He would then be discharged nonweightbearing for 1 month and partial weightbearing for 1 month.  The patient stated he will monitor his condition.  If his symptoms progress he will return to the clinic for possible surgical intervention.        HPI: I was asked to see Humberto Pino today to evaluate and treat left heel pain.  The patient indicated that he has had this pain since May of this year.  The pain is located in the back of the left heel near the Achilles tendon.  The pain is moderate and is aggravated with weightbearing and ambulation.  The pain is more pronounced when he first gets out of bed in the mornings.  He stated that he can be quite stiff initially but once he becomes active his flexibility does improve.  He denies any trauma to his foot.  He has not had any associated redness or swelling.  The back of the heel can be aggravated by certain shoes.  He denies any other previous treatment.  The patient was seen in consultation at the request of Rajiv Ludwig MD for evaluation treatment of left heel pain.     Past Medical History:   Diagnosis Date    Arthritis 01/01/2016    Chronic maxillary sinusitis     Diverticulosis of colon (without mention of hemorrhage)     repeat 2012    ADINA (obstructive sleep apnea)     Tinnitus         Social History     Socioeconomic History    Marital status:      Spouse name: Manasa    Number of children: 2    Years of education: 23    Highest education level: Not on file   Occupational History    Occupation: teacher     Employer: [employer]   Tobacco Use    Smoking status: Never     Passive exposure: Past    Smokeless tobacco: Never   Vaping Use    Vaping status: Never Used   Substance and Sexual Activity    Alcohol use: Yes     Comment: once/week    Drug use: No    Sexual activity: Yes     Partners: Female     Birth control/protection: None     Comment: none needed   Other Topics Concern    Parent/sibling w/ CABG, MI or angioplasty before 65F 55M? No   Social History Narrative    Dairy/d 2-3 servings/d.     Caffeine 0-3 servings/d    Exercise 3-4 x week    Sunscreen used - Yes    Seatbelts used - Yes    Working smoke/CO detectors in the home - Yes    Guns stored in the home - No    Self Breast Exams - NOT APPLICABLE    Self Testicular Exam - Yes    Eye Exam up to date - Yes    Dental Exam up to date - Yes    Pap Smear up to date - NOT APPLICABLE    Mammogram up to date - NOT APPLICABLE    PSA up to date - NOT APPLICABLE    Dexa Scan up to date - NOT APPLICABLE    Flex Sig / Colonoscopy up to date - Yes    Immunizations up to date - Yes    Abuse: Current or Past(Physical, Sexual or Emotional)- No    Do you feel safe in your environment - Yes        07/2010                 Social Determinants of Health     Financial Resource Strain: Low Risk  (2/29/2024)    Financial Resource Strain     Within the past 12 months, have you or your family members you live with been unable to get utilities (heat, electricity) when it was really needed?: No   Food Insecurity: Low Risk  (2/29/2024)    Food Insecurity     Within the past 12 months, did you worry that your food would run out before you got money to buy more?: No     Within the past 12 months, did the food you bought just not last and you  didn t have money to get more?: No   Transportation Needs: Low Risk  (2/29/2024)    Transportation Needs     Within the past 12 months, has lack of transportation kept you from medical appointments, getting your medicines, non-medical meetings or appointments, work, or from getting things that you need?: No   Physical Activity: Sufficiently Active (2/29/2024)    Exercise Vital Sign     Days of Exercise per Week: 6 days     Minutes of Exercise per Session: 30 min   Stress: Stress Concern Present (2/29/2024)    [place] [employer] - Occupational Stress Questionnaire     Feeling of Stress : To some extent   Social Connections: Unknown (2/29/2024)    Social Connection and Isolation Panel [NHANES]     Frequency of Communication with Friends and Family: Not on file     Frequency of Social Gatherings with Friends and Family: Once a week     Attends Pentecostalism Services: Not on file     Active Member of Clubs or Organizations: Not on file     Attends Club or Organization Meetings: Not on file     Marital Status: Not on file   Interpersonal Safety: Low Risk  (3/6/2024)    Interpersonal Safety     Do you feel physically and emotionally safe where you currently live?: Yes     Within the past 12 months, have you been hit, slapped, kicked or otherwise physically hurt by someone?: No     Within the past 12 months, have you been humiliated or emotionally abused in other ways by your partner or ex-partner?: No   Housing Stability: Low Risk  (2/29/2024)    Housing Stability     Do you have housing? : Yes     Are you worried about losing your housing?: No          Allergies   Allergen Reactions    Hay Fever & [A.R.M.] Other (See Comments)     Sneezing & stuffy nose          Current Outpatient Medications:     Fiber TABS, 2 tablets as needed , Disp: , Rfl:     fish oil-omega-3 fatty acids 1000 MG capsule, Take 1 g by mouth every morning, Disp: 90 capsule, Rfl: 3    Multiple Vitamin (MULTI VITAMIN MENS PO), Take 1  tablet by mouth every morning, Disp: , Rfl:     ORDER FOR DME, Use CPAP as directed by your provider, Disp: , Rfl:     Sodium Chloride-Sodium Bicarb (NETI POT SINUS WASH NA), , Disp: , Rfl:      Family History   Problem Relation Age of Onset    Arthritis Mother         osteoporosis    Respiratory Mother         on oxygen-was smoker, COPD    Osteoporosis Mother         significant problem    Cerebrovascular Disease Father         Age 85-87    C.A.D. Father         Rapid heart rates    Other - See Comments Father 62        petuitary tumor    Cerebrovascular Disease Paternal Grandmother         Age about 80    Arthritis Paternal Grandmother     Cerebrovascular Disease Paternal Grandfather         age 94    Anesthesia Reaction No family hx of     Venous thrombosis No family hx of         Social History     Socioeconomic History    Marital status:      Spouse name: Manasa    Number of children: 2    Years of education: 23    Highest education level: Not on file   Occupational History    Occupation: teacher     Employer: [employer]   Tobacco Use    Smoking status: Never     Passive exposure: Past    Smokeless tobacco: Never   Vaping Use    Vaping status: Never Used   Substance and Sexual Activity    Alcohol use: Yes     Comment: once/week    Drug use: No    Sexual activity: Yes     Partners: Female     Birth control/protection: None     Comment: none needed   Other Topics Concern    Parent/sibling w/ CABG, MI or angioplasty before 65F 55M? No   Social History Narrative    Dairy/d 2-3 servings/d.     Caffeine 0-3 servings/d    Exercise 3-4 x week    Sunscreen used - Yes    Seatbelts used - Yes    Working smoke/CO detectors in the home - Yes    Guns stored in the home - No    Self Breast Exams - NOT APPLICABLE    Self Testicular Exam - Yes    Eye Exam up to date - Yes    Dental Exam up to date - Yes    Pap Smear up to date - NOT APPLICABLE    Mammogram up to date - NOT APPLICABLE    PSA up to date - NOT  APPLICABLE    Dexa Scan up to date - NOT APPLICABLE    Flex Sig / Colonoscopy up to date - Yes    Immunizations up to date - Yes    Abuse: Current or Past(Physical, Sexual or Emotional)- No    Do you feel safe in your environment - Yes        07/2010                 Social Determinants of Health     Financial Resource Strain: Low Risk  (2/29/2024)    Financial Resource Strain     Within the past 12 months, have you or your family members you live with been unable to get utilities (heat, electricity) when it was really needed?: No   Food Insecurity: Low Risk  (2/29/2024)    Food Insecurity     Within the past 12 months, did you worry that your food would run out before you got money to buy more?: No     Within the past 12 months, did the food you bought just not last and you didn t have money to get more?: No   Transportation Needs: Low Risk  (2/29/2024)    Transportation Needs     Within the past 12 months, has lack of transportation kept you from medical appointments, getting your medicines, non-medical meetings or appointments, work, or from getting things that you need?: No   Physical Activity: Sufficiently Active (2/29/2024)    Exercise Vital Sign     Days of Exercise per Week: 6 days     Minutes of Exercise per Session: 30 min   Stress: Stress Concern Present (2/29/2024)    [place] [employer]ational [employer] - Occupational Stress Questionnaire     Feeling of Stress : To some extent   Social Connections: Unknown (2/29/2024)    Social Connection and Isolation Panel [NHANES]     Frequency of Communication with Friends and Family: Not on file     Frequency of Social Gatherings with Friends and Family: Once a week     Attends Judaism Services: Not on file     Active Member of Clubs or Organizations: Not on file     Attends Club or Organization Meetings: Not on file     Marital Status: Not on file   Interpersonal Safety: Low Risk  (3/6/2024)    Interpersonal Safety     Do you feel physically and emotionally  safe where you currently live?: Yes     Within the past 12 months, have you been hit, slapped, kicked or otherwise physically hurt by someone?: No     Within the past 12 months, have you been humiliated or emotionally abused in other ways by your partner or ex-partner?: No   Housing Stability: Low Risk  (2/29/2024)    Housing Stability     Do you have housing? : Yes     Are you worried about losing your housing?: No        Review of Systems - Patient denies fever, chills, rash, wound,  numbness, weakness, heart burn, blood in stool, chest pain with activity, calf pain when walking, shortness of breath with activity, chronic cough, easy bleeding/bruising, swelling of ankles, excessive thirst, fatigue, depression, anxiety.  Patient admits to left heel pain.      OBJECTIVE:  Appearance: alert, well appearing, and in no distress.    Pulse 71   Wt 106.6 kg (235 lb)   SpO2 94%   BMI 31.43 kg/m       Body mass index is 31.43 kg/m .     General appearance: Patient is alert and fully cooperative with history & exam.  No sign of distress is noted during the visit.  Psychiatric: Affect is pleasant & appropriate.  Patient appears motivated to improve health.  Respiratory: Breathing is regular & unlabored while sitting.  HEENT: Hearing is intact to spoken word.  Speech is clear.  No gross evidence of visual impairment that would impact ambulation.    Vascular: Dorsalis pedis and posterior tibial pulses are palpable. There is pedal hair growth bilaterally.  CFT < 3 sec from anterior tibial surface to distal digits bilaterally. There is no appreciable edema noted.  Dermatologic: Turgor and texture are within normal limits. No coloration or temperature changes. No primary or secondary lesions noted.  Neurologic: All epicritic and proprioceptive sensations are grossly intact bilaterally.  Musculoskeletal: All active and passive ankle, subtalar, midtarsal, and 1st MPJ range of motion are grossly intact. Manual muscle strength is  within normal limits bilaterally. All dorsiflexors, plantarflexors, invertors, evertors are intact . Tenderness present to the posterior aspect of the left heel on palpation.  Mild tenderness to the posterior aspect of the left heel with range of motion.  There is a large firm palpable subcutaneous mass on the posterior aspect of the left heel.  Calf is soft/non-tender without warmth/induration    Imaging:       No images are attached to the encounter or orders placed in the encounter.     No results found.   No results found.         Jourdan Dee DPM  [facility]le Surgery/Podiatry

## 2024-09-04 NOTE — LETTER
9/4/2024      Humberto Pino  [address] [place] [address]      Dear Colleague,    Thank you for referring your patient, Humberto Pino, to the [facility]. Please see a copy of my visit note below.    FOOT AND ANKLE SURGERY/PODIATRY CONSULT NOTE         ASSESSMENT:   Left heel pain  Retrocalcaneal spur left      TREATMENT:  An x-ray of the left foot was taken today.  The x-rays were read and interpreted by this physician.  I informed the patient that he does have a large hyperostosis on the posterior aspect of the calcaneus which is the source of his pain.  I told the patient typically bone spur at the side require surgical intervention.  I have recommended excision of the retrocalcaneal spur to alleviate the patient's pain.  He was told this procedure is performed on an outpatient basis under general anesthesia.  He was told the procedure takes approximately 30 minutes to perform.  He would then be discharged nonweightbearing for 1 month and partial weightbearing for 1 month.  The patient stated he will monitor his condition.  If his symptoms progress he will return to the clinic for possible surgical intervention.        HPI: I was asked to see Humberto Pino today to evaluate and treat left heel pain.  The patient indicated that he has had this pain since May of this year.  The pain is located in the back of the left heel near the Achilles tendon.  The pain is moderate and is aggravated with weightbearing and ambulation.  The pain is more pronounced when he first gets out of bed in the mornings.  He stated that he can be quite stiff initially but once he becomes active his flexibility does improve.  He denies any trauma to his foot.  He has not had any associated redness or swelling.  The back of the heel can be aggravated by certain shoes.  He denies any other previous treatment.  The patient was seen in consultation at the request of Rajiv Ludwig MD for evaluation  treatment of left heel pain.     Past Medical History:   Diagnosis Date     Arthritis 01/01/2016     Chronic maxillary sinusitis      Diverticulosis of colon (without mention of hemorrhage)     repeat 2012     ADINA (obstructive sleep apnea)      Tinnitus        Social History     Socioeconomic History     Marital status:      Spouse name: Manasa     Number of children: 2     Years of education: 23     Highest education level: Not on file   Occupational History     Occupation: teacher     Employer: [employer]   Tobacco Use     Smoking status: Never     Passive exposure: Past     Smokeless tobacco: Never   Vaping Use     Vaping status: Never Used   Substance and Sexual Activity     Alcohol use: Yes     Comment: once/week     Drug use: No     Sexual activity: Yes     Partners: Female     Birth control/protection: None     Comment: none needed   Other Topics Concern     Parent/sibling w/ CABG, MI or angioplasty before 65F 55M? No   Social History Narrative    Dairy/d 2-3 servings/d.     Caffeine 0-3 servings/d    Exercise 3-4 x week    Sunscreen used - Yes    Seatbelts used - Yes    Working smoke/CO detectors in the home - Yes    Guns stored in the home - No    Self Breast Exams - NOT APPLICABLE    Self Testicular Exam - Yes    Eye Exam up to date - Yes    Dental Exam up to date - Yes    Pap Smear up to date - NOT APPLICABLE    Mammogram up to date - NOT APPLICABLE    PSA up to date - NOT APPLICABLE    Dexa Scan up to date - NOT APPLICABLE    Flex Sig / Colonoscopy up to date - Yes    Immunizations up to date - Yes    Abuse: Current or Past(Physical, Sexual or Emotional)- No    Do you feel safe in your environment - Yes        07/2010                 Social Determinants of Health     Financial Resource Strain: Low Risk  (2/29/2024)    Financial Resource Strain      Within the past 12 months, have you or your family members you live with been unable to get utilities (heat, electricity) when it was  really needed?: No   Food Insecurity: Low Risk  (2/29/2024)    Food Insecurity      Within the past 12 months, did you worry that your food would run out before you got money to buy more?: No      Within the past 12 months, did the food you bought just not last and you didn t have money to get more?: No   Transportation Needs: Low Risk  (2/29/2024)    Transportation Needs      Within the past 12 months, has lack of transportation kept you from medical appointments, getting your medicines, non-medical meetings or appointments, work, or from getting things that you need?: No   Physical Activity: Sufficiently Active (2/29/2024)    Exercise Vital Sign      Days of Exercise per Week: 6 days      Minutes of Exercise per Session: 30 min   Stress: Stress Concern Present (2/29/2024)    [place] [place] [employer] - Occupational Stress Questionnaire      Feeling of Stress : To some extent   Social Connections: Unknown (2/29/2024)    Social Connection and Isolation Panel [NHANES]      Frequency of Communication with Friends and Family: Not on file      Frequency of Social Gatherings with Friends and Family: Once a week      Attends Roman Catholic Services: Not on file      Active Member of Clubs or Organizations: Not on file      Attends Club or Organization Meetings: Not on file      Marital Status: Not on file   Interpersonal Safety: Low Risk  (3/6/2024)    Interpersonal Safety      Do you feel physically and emotionally safe where you currently live?: Yes      Within the past 12 months, have you been hit, slapped, kicked or otherwise physically hurt by someone?: No      Within the past 12 months, have you been humiliated or emotionally abused in other ways by your partner or ex-partner?: No   Housing Stability: Low Risk  (2/29/2024)    Housing Stability      Do you have housing? : Yes      Are you worried about losing your housing?: No          Allergies   Allergen Reactions     Hay Fever & [A.R.M.] Other (See  Comments)     Sneezing & stuffy nose          Current Outpatient Medications:      Fiber TABS, 2 tablets as needed , Disp: , Rfl:      fish oil-omega-3 fatty acids 1000 MG capsule, Take 1 g by mouth every morning, Disp: 90 capsule, Rfl: 3     Multiple Vitamin (MULTI VITAMIN MENS PO), Take 1 tablet by mouth every morning, Disp: , Rfl:      ORDER FOR DME, Use CPAP as directed by your provider, Disp: , Rfl:      Sodium Chloride-Sodium Bicarb (NETI POT SINUS WASH NA), , Disp: , Rfl:      Family History   Problem Relation Age of Onset     Arthritis Mother         osteoporosis     Respiratory Mother         on oxygen-was smoker, COPD     Osteoporosis Mother         significant problem     Cerebrovascular Disease Father         Age 85-87     C.A.D. Father         Rapid heart rates     Other - See Comments Father 62        petuitary tumor     Cerebrovascular Disease Paternal Grandmother         Age about 80     Arthritis Paternal Grandmother      Cerebrovascular Disease Paternal Grandfather         age 94     Anesthesia Reaction No family hx of      Venous thrombosis No family hx of         Social History     Socioeconomic History     Marital status:      Spouse name: Manasa     Number of children: 2     Years of education: 23     Highest education level: Not on file   Occupational History     Occupation: teacher     Employer:  [employer]   Tobacco Use     Smoking status: Never     Passive exposure: Past     Smokeless tobacco: Never   Vaping Use     Vaping status: Never Used   Substance and Sexual Activity     Alcohol use: Yes     Comment: once/week     Drug use: No     Sexual activity: Yes     Partners: Female     Birth control/protection: None     Comment: none needed   Other Topics Concern     Parent/sibling w/ CABG, MI or angioplasty before 65F 55M? No   Social History Narrative    Dairy/d 2-3 servings/d.     Caffeine 0-3 servings/d    Exercise 3-4 x week    Sunscreen used - Yes    Seatbelts used -  Yes    Working smoke/CO detectors in the home - Yes    Guns stored in the home - No    Self Breast Exams - NOT APPLICABLE    Self Testicular Exam - Yes    Eye Exam up to date - Yes    Dental Exam up to date - Yes    Pap Smear up to date - NOT APPLICABLE    Mammogram up to date - NOT APPLICABLE    PSA up to date - NOT APPLICABLE    Dexa Scan up to date - NOT APPLICABLE    Flex Sig / Colonoscopy up to date - Yes    Immunizations up to date - Yes    Abuse: Current or Past(Physical, Sexual or Emotional)- No    Do you feel safe in your environment - Yes        07/2010                 Social Determinants of Health     Financial Resource Strain: Low Risk  (2/29/2024)    Financial Resource Strain      Within the past 12 months, have you or your family members you live with been unable to get utilities (heat, electricity) when it was really needed?: No   Food Insecurity: Low Risk  (2/29/2024)    Food Insecurity      Within the past 12 months, did you worry that your food would run out before you got money to buy more?: No      Within the past 12 months, did the food you bought just not last and you didn t have money to get more?: No   Transportation Needs: Low Risk  (2/29/2024)    Transportation Needs      Within the past 12 months, has lack of transportation kept you from medical appointments, getting your medicines, non-medical meetings or appointments, work, or from getting things that you need?: No   Physical Activity: Sufficiently Active (2/29/2024)    Exercise Vital Sign      Days of Exercise per Week: 6 days      Minutes of Exercise per Session: 30 min   Stress: Stress Concern Present (2/29/2024)    [place] [employer] - Occupational Stress Questionnaire      Feeling of Stress : To some extent   Social Connections: Unknown (2/29/2024)    Social Connection and Isolation Panel [NHANES]      Frequency of Communication with Friends and Family: Not on file      Frequency of Social Gatherings with  Friends and Family: Once a week      Attends Buddhism Services: Not on file      Active Member of Clubs or Organizations: Not on file      Attends Club or Organization Meetings: Not on file      Marital Status: Not on file   Interpersonal Safety: Low Risk  (3/6/2024)    Interpersonal Safety      Do you feel physically and emotionally safe where you currently live?: Yes      Within the past 12 months, have you been hit, slapped, kicked or otherwise physically hurt by someone?: No      Within the past 12 months, have you been humiliated or emotionally abused in other ways by your partner or ex-partner?: No   Housing Stability: Low Risk  (2/29/2024)    Housing Stability      Do you have housing? : Yes      Are you worried about losing your housing?: No        Review of Systems - Patient denies fever, chills, rash, wound,  numbness, weakness, heart burn, blood in stool, chest pain with activity, calf pain when walking, shortness of breath with activity, chronic cough, easy bleeding/bruising, swelling of ankles, excessive thirst, fatigue, depression, anxiety.  Patient admits to left heel pain.      OBJECTIVE:  Appearance: alert, well appearing, and in no distress.    Pulse 71   Wt 106.6 kg (235 lb)   SpO2 94%   BMI 31.43 kg/m       Body mass index is 31.43 kg/m .     General appearance: Patient is alert and fully cooperative with history & exam.  No sign of distress is noted during the visit.  Psychiatric: Affect is pleasant & appropriate.  Patient appears motivated to improve health.  Respiratory: Breathing is regular & unlabored while sitting.  HEENT: Hearing is intact to spoken word.  Speech is clear.  No gross evidence of visual impairment that would impact ambulation.    Vascular: Dorsalis pedis and posterior tibial pulses are palpable. There is pedal hair growth bilaterally.  CFT < 3 sec from anterior tibial surface to distal digits bilaterally. There is no appreciable edema noted.  Dermatologic: Turgor and  texture are within normal limits. No coloration or temperature changes. No primary or secondary lesions noted.  Neurologic: All epicritic and proprioceptive sensations are grossly intact bilaterally.  Musculoskeletal: All active and passive ankle, subtalar, midtarsal, and 1st MPJ range of motion are grossly intact. Manual muscle strength is within normal limits bilaterally. All dorsiflexors, plantarflexors, invertors, evertors are intact . Tenderness present to the posterior aspect of the left heel on palpation.  Mild tenderness to the posterior aspect of the left heel with range of motion.  There is a large firm palpable subcutaneous mass on the posterior aspect of the left heel.  Calf is soft/non-tender without warmth/induration    Imaging:       No images are attached to the encounter or orders placed in the encounter.     No results found.   No results found.         Jourdan Dee DPM  [facility] Foot & Ankle Surgery/Podiatry       Again, thank you for allowing me to participate in the care of your patient.        Sincerely,        Jourdan Christianson DPM

## 2024-09-20 ENCOUNTER — IMMUNIZATION (OUTPATIENT)
Dept: FAMILY MEDICINE | Facility: CLINIC | Age: 69
End: 2024-09-20
Payer: COMMERCIAL

## 2024-09-20 DIAGNOSIS — Z23 NEED FOR PROPHYLACTIC VACCINATION AND INOCULATION AGAINST INFLUENZA: Primary | ICD-10-CM

## 2024-09-20 PROCEDURE — 90662 IIV NO PRSV INCREASED AG IM: CPT

## 2024-09-20 PROCEDURE — 99207 PR NO CHARGE NURSE ONLY: CPT

## 2024-09-20 PROCEDURE — G0008 ADMIN INFLUENZA VIRUS VAC: HCPCS

## 2024-10-01 ENCOUNTER — ALLIED HEALTH/NURSE VISIT (OUTPATIENT)
Dept: FAMILY MEDICINE | Facility: CLINIC | Age: 69
End: 2024-10-01
Payer: COMMERCIAL

## 2024-10-01 DIAGNOSIS — Z23 NEED FOR VACCINATION: Primary | ICD-10-CM

## 2024-10-01 PROCEDURE — 90632 HEPA VACCINE ADULT IM: CPT

## 2024-10-01 PROCEDURE — 99207 PR NO CHARGE NURSE ONLY: CPT

## 2024-10-01 PROCEDURE — 90472 IMMUNIZATION ADMIN EACH ADD: CPT

## 2024-10-01 PROCEDURE — G0010 ADMIN HEPATITIS B VACCINE: HCPCS

## 2024-10-01 PROCEDURE — 90746 HEPB VACCINE 3 DOSE ADULT IM: CPT

## 2024-10-01 NOTE — NURSING NOTE
Prior to immunization administration, verified patients identity using patient s name and date of birth. Please see Immunization Activity for additional information.     Screening Questionnaire for Adult Immunization    Are you sick today?   No   Do you have allergies to medications, food, a vaccine component or latex?   No   Have you ever had a serious reaction after receiving a vaccination?   No   Do you have a long-term health problem with heart, lung, kidney, or metabolic disease (e.g., diabetes), asthma, a blood disorder, no spleen, complement component deficiency, a cochlear implant, or a spinal fluid leak?  Are you on long-term aspirin therapy?   No   Do you have cancer, leukemia, HIV/AIDS, or any other immune system problem?   No   Do you have a parent, brother, or sister with an immune system problem?   No   In the past 3 months, have you taken medications that affect  your immune system, such as prednisone, other steroids, or anticancer drugs; drugs for the treatment of rheumatoid arthritis, Crohn s disease, or psoriasis; or have you had radiation treatments?   No   Have you had a seizure, or a brain or other nervous system problem?   No   During the past year, have you received a transfusion of blood or blood    products, or been given immune (gamma) globulin or antiviral drug?   No   For women: Are you pregnant or is there a chance you could become       pregnant during the next month?   No   Have you received any vaccinations in the past 4 weeks?   No     Immunization questionnaire answers were all negative.    I have reviewed the following standing orders:     This patient is due and qualifies for the Hepatitis A & B vaccine.    Click here for HEP A & B STANDING ORDER    I have reviewed the vaccines inclusion and exclusion criteria; No concerns regarding eligibility.     Patient instructed to remain in clinic for 15 minutes afterwards, and to report any adverse reactions.     Screening performed by Amina  ARTHUR Lemos on 10/1/2024 at 10:24 AM.

## 2024-11-06 ENCOUNTER — OFFICE VISIT (OUTPATIENT)
Dept: PODIATRY | Facility: CLINIC | Age: 69
End: 2024-11-06
Payer: COMMERCIAL

## 2024-11-06 ENCOUNTER — ANCILLARY PROCEDURE (OUTPATIENT)
Dept: GENERAL RADIOLOGY | Facility: CLINIC | Age: 69
End: 2024-11-06
Attending: PODIATRIST
Payer: COMMERCIAL

## 2024-11-06 ENCOUNTER — OFFICE VISIT (OUTPATIENT)
Dept: OTOLARYNGOLOGY | Facility: CLINIC | Age: 69
End: 2024-11-06
Attending: STUDENT IN AN ORGANIZED HEALTH CARE EDUCATION/TRAINING PROGRAM
Payer: COMMERCIAL

## 2024-11-06 VITALS
HEIGHT: 73 IN | WEIGHT: 245.6 LBS | BODY MASS INDEX: 32.55 KG/M2 | OXYGEN SATURATION: 97 % | SYSTOLIC BLOOD PRESSURE: 166 MMHG | HEART RATE: 68 BPM | DIASTOLIC BLOOD PRESSURE: 92 MMHG

## 2024-11-06 VITALS — OXYGEN SATURATION: 93 % | RESPIRATION RATE: 16 BRPM | HEART RATE: 61 BPM

## 2024-11-06 DIAGNOSIS — M20.22 HALLUX RIGIDUS OF BOTH FEET: ICD-10-CM

## 2024-11-06 DIAGNOSIS — M20.21 HALLUX RIGIDUS OF BOTH FEET: ICD-10-CM

## 2024-11-06 DIAGNOSIS — D36.9 INVERTED PAPILLOMA: Primary | ICD-10-CM

## 2024-11-06 DIAGNOSIS — M20.22 HALLUX RIGIDUS OF BOTH FEET: Primary | ICD-10-CM

## 2024-11-06 DIAGNOSIS — J34.89 NASAL OBSTRUCTION: ICD-10-CM

## 2024-11-06 DIAGNOSIS — M20.21 HALLUX RIGIDUS OF BOTH FEET: Primary | ICD-10-CM

## 2024-11-06 PROCEDURE — 31231 NASAL ENDOSCOPY DX: CPT | Performed by: STUDENT IN AN ORGANIZED HEALTH CARE EDUCATION/TRAINING PROGRAM

## 2024-11-06 PROCEDURE — 99212 OFFICE O/P EST SF 10 MIN: CPT | Mod: 25 | Performed by: STUDENT IN AN ORGANIZED HEALTH CARE EDUCATION/TRAINING PROGRAM

## 2024-11-06 PROCEDURE — 73630 X-RAY EXAM OF FOOT: CPT | Mod: LT | Performed by: PODIATRIST

## 2024-11-06 PROCEDURE — 99214 OFFICE O/P EST MOD 30 MIN: CPT | Performed by: PODIATRIST

## 2024-11-06 ASSESSMENT — PAIN SCALES - GENERAL
PAINLEVEL_OUTOF10: MODERATE PAIN (4)
PAINLEVEL_OUTOF10: MILD PAIN (2)

## 2024-11-06 NOTE — PROGRESS NOTES
FOOT AND ANKLE SURGERY/PODIATRY Progress Note        ASSESSMENT:   Ingrown toenail right great toe  Hallux rigidus bilaterally    HPI: Humberto Pino was seen again today complaining of painful first metatarsal phalangeal joint both feet.  The patient has had a problem with both of his feet for several years.  He stated that he has aching pain at the first metatarsal phalangeal joint with prolonged weightbearing and ambulation.  The pain is relieved while resting.  He denies any recent trauma to his feet.  He has not had any associated redness or swelling.  The patient also complained of a painful ingrown toenail involving the lateral border of the left great toe.  He has not had any edema, erythema, cellulitis, drainage or bleeding noted along the lateral margin of the left great toenail.    Past Medical History:   Diagnosis Date    Arthritis 01/01/2016    Chronic maxillary sinusitis     Diverticulosis of colon (without mention of hemorrhage)     repeat 2012    ADINA (obstructive sleep apnea)     Tinnitus         Past Surgical History:   Procedure Laterality Date    ARTHROSCOPY KNEE  08/04/2011    Procedure:ARTHROSCOPY KNEE; With debridement of medial and lateral meniscus  ; Surgeon:AMARILIS PETERS; Location:[facility]    ENDOSCOPIC ENDONASAL SURGERY Left 2/13/2023    Procedure: LEFT ENDOSCOPIC IMAGE-GUIDED MAXILLARY ANTROSTOMY WITH TISSUE REMOVAL;  Surgeon: Chet Cornejo MD;  [place] [facility] [place]    HERNIA REPAIR, UMBILICAL      OPTICAL TRACKING SYSTEM ENDOSCOPIC SINUS SURGERY Right 2/13/2023    Procedure: RIGHT ENDOSCOPIC, IMAGE-GUIDED MEDIAL MAXILLECTOMY VIA DENKER'S APPROACH;  Surgeon: Chet Cornejo MD;  [place] [facility] [place]    right thumb, index finger, middle finger and ring finger trigger release  2021    TOE SURGERY      feb 2006    [facility] REPAIR CRUCIATE LIGAMENT,KNEE      both knees-ACL       Allergies   Allergen Reactions    Hay Fever & [A.R.M.] Other (See Comments)     Sneezing & stuffy nose         Current  Outpatient Medications:     Fiber TABS, 2 tablets as needed , Disp: , Rfl:     fish oil-omega-3 fatty acids 1000 MG capsule, Take 1 g by mouth every morning, Disp: 90 capsule, Rfl: 3    Multiple Vitamin (MULTI VITAMIN MENS PO), Take 1 tablet by mouth every morning, Disp: , Rfl:     ORDER FOR DME, Use CPAP as directed by your provider, Disp: , Rfl:     Sodium Chloride-Sodium Bicarb (NETI POT SINUS WASH NA), , Disp: , Rfl:     Family History   Problem Relation Age of Onset    Arthritis Mother         osteoporosis    Respiratory Mother         on oxygen-was smoker, COPD    Osteoporosis Mother         significant problem    Cerebrovascular Disease Father         Age 85-87    C.A.D. Father         Rapid heart rates    Other - See Comments Father 62        petuitary tumor    Cerebrovascular Disease Paternal Grandmother         Age about 80    Arthritis Paternal Grandmother     Cerebrovascular Disease Paternal Grandfather         age 94    Anesthesia Reaction No family hx of     Venous thrombosis No family hx of        Social History     Socioeconomic History    Marital status:      Spouse name: Manasa    Number of children: 2    Years of education: 23    Highest education level: Not on file   Occupational History    Occupation: teacher     Employer: [employer]   Tobacco Use    Smoking status: Never     Passive exposure: Past    Smokeless tobacco: Never   Vaping Use    Vaping status: Never Used   Substance and Sexual Activity    Alcohol use: Yes     Comment: once/week    Drug use: No    Sexual activity: Yes     Partners: Female     Birth control/protection: None     Comment: none needed   Other Topics Concern    Parent/sibling w/ CABG, MI or angioplasty before 65F 55M? No   Social History Narrative    Dairy/d 2-3 servings/d.     Caffeine 0-3 servings/d    Exercise 3-4 x week    Sunscreen used - Yes    Seatbelts used - Yes    Working smoke/CO detectors in the home - Yes    Guns stored in the home - No     Self Breast Exams - NOT APPLICABLE    Self Testicular Exam - Yes    Eye Exam up to date - Yes    Dental Exam up to date - Yes    Pap Smear up to date - NOT APPLICABLE    Mammogram up to date - NOT APPLICABLE    PSA up to date - NOT APPLICABLE    Dexa Scan up to date - NOT APPLICABLE    Flex Sig / Colonoscopy up to date - Yes    Immunizations up to date - Yes    Abuse: Current or Past(Physical, Sexual or Emotional)- No    Do you feel safe in your environment - Yes        07/2010                 Social Drivers of Health     Financial Resource Strain: Low Risk  (2/29/2024)    Financial Resource Strain     Within the past 12 months, have you or your family members you live with been unable to get utilities (heat, electricity) when it was really needed?: No   Food Insecurity: Low Risk  (2/29/2024)    Food Insecurity     Within the past 12 months, did you worry that your food would run out before you got money to buy more?: No     Within the past 12 months, did the food you bought just not last and you didn t have money to get more?: No   Transportation Needs: Low Risk  (2/29/2024)    Transportation Needs     Within the past 12 months, has lack of transportation kept you from medical appointments, getting your medicines, non-medical meetings or appointments, work, or from getting things that you need?: No   Physical Activity: Sufficiently Active (2/29/2024)    Exercise Vital Sign     Days of Exercise per Week: 6 days     Minutes of Exercise per Session: 30 min   Stress: Stress Concern Present (2/29/2024)    [place] [employer] - Occupational Stress Questionnaire     Feeling of Stress : To some extent   Social Connections: Unknown (2/29/2024)    Social Connection and Isolation Panel [NHANES]     Frequency of Communication with Friends and Family: Not on file     Frequency of Social Gatherings with Friends and Family: Once a week     Attends Catholic Services: Not on file     Active Member of Clubs or  Organizations: Not on file     Attends Club or Organization Meetings: Not on file     Marital Status: Not on file   Interpersonal Safety: Low Risk  (3/6/2024)    Interpersonal Safety     Do you feel physically and emotionally safe where you currently live?: Yes     Within the past 12 months, have you been hit, slapped, kicked or otherwise physically hurt by someone?: No     Within the past 12 months, have you been humiliated or emotionally abused in other ways by your partner or ex-partner?: No   Housing Stability: Low Risk  (2/29/2024)    Housing Stability     Do you have housing? : Yes     Are you worried about losing your housing?: No       10 point Review of Systems is negative      Pulse 61   Resp 16   SpO2 93%     BMI= There is no height or weight on file to calculate BMI.    OBJECTIVE:  General appearance: Patient is alert and fully cooperative with history & exam.  No sign of distress is noted during the visit.  Vascular: Dorsalis pedis and posterior tibial pulses are palpable. There is pedal hair growth bilaterally.  CFT < 3 sec from anterior tibial surface to distal digits bilaterally. There is no appreciable edema noted.  Dermatologic: The lateral border of the left great toenail is incurvated.  Turgor and texture are within normal limits. No coloration or temperature changes. No primary or secondary lesions noted.  Neurologic: All epicritic and proprioceptive sensations are grossly intact bilaterally.  Musculoskeletal: All active and passive ankle, subtalar, midtarsal, and 1st MPJ range of motion are grossly intact bilaterally.  Manual muscle strength is within normal limits bilaterally. All dorsiflexors, plantarflexors, invertors, evertors are intact bilaterally. Tenderness present to the first MPJ bilaterally and the lateral margin of the left great toenail on palpation. Tenderness to the first MPJ bilaterally with range of motion. Calf is soft/non-tender without warmth/induration    Imaging:          No results found.         TREATMENT:  X-rays of both feet were taken today.  The x-rays were read and interpreted by disposition.  I informed the patient that the x-rays show significant degenerative changes at the first MPJ.  I have recommended a first metatarsal phalangeal joint implant arthroplasty of both feet.  The patient was told these procedures will be performed on an outpatient basis under local anesthesia with IV sedation.  He was told the procedures will take approximately 30 minutes to perform.  He would then be discharged weightbearing in a postoperative shoe for 2 weeks.  The patient was asked to go n.p.o. at midnight prior to procedure and he was asked to see his primary care physician for preoperative consultation.  All pertinent questions were invited and answered.  I have also recommended a partial nail excision and matrixectomy of the lateral border of the left great toenail.  This procedure will be done in the clinic setting.        Jourdan Christianson; KENNY  [facility] Foot & Ankle Surgery/Podiatry

## 2024-11-06 NOTE — LETTER
11/6/2024       RE: Humberto Pino  [address]  [place] [address]     Dear Colleague,    Thank you for referring your patient, Humberto Pino, to the [facility] [place] at [facility]. Please see a copy of my visit note below.      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    10/26/23: Humberto Pino is a 67 year old male who presents for follow up. No new issues. Very happy with his care      5/1/24: He reports that he COVID around January 2nd with mild symptoms such as fatigue and gained 15 lbs, but reports no other big changes since his last visit.    11/6/24: Today, he reports that he is doing well. He reports that he has been doing his nasal rinses regularly. He states that he has an upcoming toe surgery.    Sino-Nasal Outcome Test (SNOT - 22)  1. Need to Blow Nose: (Patient-Rptd) (P) Very mild  2. Nasal Blockage: (Patient-Rptd) (P) None  3. Sneezing: (Patient-Rptd) (P) Very mild  4. Runny Nose: (Patient-Rptd) (P) Very mild  5. Cough: (Patient-Rptd) (P) None  6. Post-nasal discharge: (Patient-Rptd) (P) None  7. Thick nasal discharge: (Patient-Rptd) (P) None  8. Ear fullness: (Patient-Rptd) (P) Very mild  9. Dizziness: (Patient-Rptd) (P) None  10. Ear Pain: (Patient-Rptd) (P) None  11. Facial pain/pressure: (Patient-Rptd) (P) None  12. Decreased Sense of Smell/Taste: (Patient-Rptd) (P) Mild or slight  13. Difficulty falling asleep: (Patient-Rptd) (P) None  14. Wake up at night: (Patient-Rptd) (P) Mild or slight  15. Lack of a good night's sleep: (Patient-Rptd) (P) None  16. Wake up tired: (Patient-Rptd) (P) None  17. Fatigue: (Patient-Rptd) (P) Very mild  18. Reduced Productivity:  (Patient-Rptd) (P) None  19. Reduced Concentration: (Patient-Rptd) (P) None  20. Frustrated/restless/irritable: (Patient-Rptd) (P) None  21. Sad: (Patient-Rptd) (P) None  22. 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    Findings: This patient who had extensive tumor formation that was growing within the maxillary sinus into the nasal cavity. He also had tumor, which was growing along the infraorbital nerve canal and it was filling the space lateral to the infraorbital nerve canal.     Review of systems: A 14-point review of systems has been conducted and is negative for any notable symptoms, except as dictated in the history of present illness.     Physical Exam:  Vital signs: There were no vitals taken for this visit.   General Appearance: No acute distress, appropriate demeanor, conversant  Eyes: moist conjunctivae; EOMI; pupils symmetric; visual acuity grossly intact; no proptosis  Head: normocephalic; overall symmetric appearance without deformity  Face: overall symmetric without deformity  Ears: Normal appearance of external ear  Nose: No external deformity  Oral Cavity/oropharynx: Normal appearance of mucosa  Neck: no palpable lymphadenopathy; thyroid without palpable nodules  Lungs: symmetric chest rise; no wheezing  CV: Good distal perfusion; normal hear rate  Extremities: No deformity  Neurologic Exam: Cranial nerves II-XII are grossly intact      Procedure Note  Procedure performed: Rigid nasal endoscopy  Indication: To evaluate for sinonasal pathology not visualized on routine anterior rhinoscopy  Anesthesia: 4% topical lidocaine with 0.05 %  oxymetazoline  Description of procedure: A 30 degree, 3 mm rigid endoscope was inserted into bilateral nasal cavities and the nasal valves, nasal cavity, middle meatus, sphenoethmoid recess, nasopharynx were evaluated for evidence of obstruction, edema, purulence, polyps and/or mass/lesion.     Manning-Nestor Endoscopic Scoring System  Endoscopic observation Right Left   Polyps in middle meatus (0 = absent, 1 = restricted to middle meatus, 2 = Beyond middle meatus) 0 0   Discharge (0 = absent, 1 = thin and clear, 2 = thick, purulent) 0 0   Edema (0 = absent, 1 = mild-moderate, 2 = moderate-severe) 0 0   Crusting (0 = absent, 1 = mild-moderate, 2 = moderate-severe) 0 0   Scarring (0= absent, 1 = mild-moderate, 2 = moderate-severe) 0 0   Total 0 0     Findings  RT: no evidence of recurrent papilloma  LT: normal exam without concern for infection    The patient tolerated the procedure well without complication.     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 His right nostril shows no signs of new tumor growth. Questions and concerns were addressed.     Plan:  Continue sinus rinses  Follow-up with me in 4 months, after this appointment follow-up every 6 months pending no recurrence.    Scribe Disclosure:   I, Nelli Smith, am serving as a scribe; to document services personally performed by Steven Reid MD -based on data collection and the provider's statements to me.     Provider Disclosure:  I agree with above History, Review of Systems, Physical exam and Plan.  I have reviewed the content of the documentation and have edited it as needed. I have personally performed the services documented here and the documentation accurately represents those services  and the decisions I have made.      Electronically signed by:    Steven Reid MD    [facility]  Rhinology, Endoscopic Skull Base Surgery  [facility]  Department of Otolaryngology - Head & Neck Surgery    ~~~~~~~~~~~~~~~~~~~~~~~~~~~~~~~~~~~~~~~~~~~~~~~~~~~~~~~~~~~~~~~~~~~~~~~~~~~~~~~~~~~~~~~~~~~~~~~~~~~~~~~~~~~~~~~~~~~~~~~~~~~~~~~~~~~~~~~    Past Medical History:   Diagnosis Date     Arthritis 01/01/2016     Chronic maxillary sinusitis      Diverticulosis of colon (without mention of hemorrhage)     repeat 2012     ADINA (obstructive sleep apnea)      Tinnitus         Past Surgical History:   Procedure Laterality Date     ARTHROSCOPY KNEE  08/04/2011    Procedure:ARTHROSCOPY KNEE; With debridement of medial and lateral meniscus  ; Surgeon:AMARILIS PETERS; Location: OR     ENDOSCOPIC ENDONASAL SURGERY Left 2/13/2023    Procedure: LEFT ENDOSCOPIC IMAGE-GUIDED MAXILLARY ANTROSTOMY WITH TISSUE REMOVAL;  Surgeon: Chet Cornejo MD;  Location: [facility] OR     HERNIA REPAIR, UMBILICAL       OPTICAL TRACKING SYSTEM ENDOSCOPIC SINUS SURGERY Right 2/13/2023    Procedure: RIGHT ENDOSCOPIC, IMAGE-GUIDED MEDIAL MAXILLECTOMY VIA DENKER'S APPROACH;  Surgeon: Chet Cornejo MD;  Location: [facility] OR     right thumb, index finger, middle finger and ring finger trigger release  2021     TOE SURGERY      feb 2006     ZZC REPAIR CRUCIATE LIGAMENT,KNEE      both knees-ACL        Family History   Problem Relation Age of Onset     Arthritis Mother         osteoporosis     Respiratory Mother         on oxygen-was smoker, COPD     Osteoporosis Mother         significant problem     Cerebrovascular Disease Father         Age 85-87     C.A.D. Father         Rapid heart rates     Other - See Comments Father 62        petuitary tumor     Cerebrovascular Disease Paternal Grandmother         Age about 80     Arthritis Paternal Grandmother      Cerebrovascular Disease Paternal Grandfather         age  94     Anesthesia Reaction No family hx of      Venous thrombosis No family hx of         Social History     Socioeconomic History     Marital status:      Spouse name: Manasa     Number of children: 2     Years of education: 23   Occupational History     Occupation: teacher     Employer: [email or web address]   Tobacco Use     Smoking status: Never     Passive exposure: Past     Smokeless tobacco: Never   Vaping Use     Vaping status: Never Used   Substance and Sexual Activity     Alcohol use: Yes     Comment: once/week     Drug use: No     Sexual activity: Yes     Partners: Female     Birth control/protection: None     Comment: none needed   Other Topics Concern     Parent/sibling w/ CABG, MI or angioplasty before 65F 55M? No   Social History Narrative    Dairy/d 2-3 servings/d.     Caffeine 0-3 servings/d    Exercise 3-4 x week    Sunscreen used - Yes    Seatbelts used - Yes    Working smoke/CO detectors in the home - Yes    Guns stored in the home - No    Self Breast Exams - NOT APPLICABLE    Self Testicular Exam - Yes    Eye Exam up to date - Yes    Dental Exam up to date - Yes    Pap Smear up to date - NOT APPLICABLE    Mammogram up to date - NOT APPLICABLE    PSA up to date - NOT APPLICABLE    Dexa Scan up to date - NOT APPLICABLE    Flex Sig / Colonoscopy up to date - Yes    Immunizations up to date - Yes    Abuse: Current or Past(Physical, Sexual or Emotional)- No    Do you feel safe in your environment - Yes        07/2010                 Social Drivers of Health     Financial Resource Strain: Low Risk  (2/29/2024)    Financial Resource Strain      Within the past 12 months, have you or your family members you live with been unable to get utilities (heat, electricity) when it was really needed?: No   Food Insecurity: Low Risk  (2/29/2024)    Food Insecurity      Within the past 12 months, did you worry that your food would run out before you got money to buy more?: No      Within the past 12  months, did the food you bought just not last and you didn t have money to get more?: No   Transportation Needs: Low Risk  (2/29/2024)    Transportation Needs      Within the past 12 months, has lack of transportation kept you from medical appointments, getting your medicines, non-medical meetings or appointments, work, or from getting things that you need?: No   Physical Activity: Sufficiently Active (2/29/2024)    Exercise Vital Sign      Days of Exercise per Week: 6 days      Minutes of Exercise per Session: 30 min   Stress: Stress Concern Present (2/29/2024)    [place] [employer] - Occupational Stress Questionnaire      Feeling of Stress : To some extent   Social Connections: Unknown (2/29/2024)    Social Connection and Isolation Panel [NHANES]      Frequency of Social Gatherings with Friends and Family: Once a week   Interpersonal Safety: Low Risk  (3/6/2024)    Interpersonal Safety      Do you feel physically and emotionally safe where you currently live?: Yes      Within the past 12 months, have you been hit, slapped, kicked or otherwise physically hurt by someone?: No      Within the past 12 months, have you been humiliated or emotionally abused in other ways by your partner or ex-partner?: No   Housing Stability: Low Risk  (2/29/2024)    Housing Stability      Do you have housing? : Yes      Are you worried about losing your housing?: No          Again, thank you for allowing me to participate in the care of your patient.      Sincerely,    Steven Reid MD

## 2024-11-06 NOTE — PATIENT INSTRUCTIONS
Humberto,      Your surgery with [facility] has been ordered. A  will contact you in the next few days to schedule. Or feel free to call [phone] to schedule sooner.     Procedure:       Procedure Date :     *A surgery nurse will call you 1-2 days before surgery to inform you of the time of arrival for surgery.    Surgeon:   Dr. Jourdan Christianson    Admission Type:   Outpatient    Procedure Location:   [facility]:  [address] [place] [address] [address] [place] [address] (Fax: [phone])   Please enter through the main entrance. Take the elevators to the 3rd floor. Check in at  of [address].     Post Operative Appointment:       Preparation Instructions to complete:    []  You will need a Pre-op Physical within 30 days before surgery with your primary care provider. This exam is required by the anesthesiologist to ensure a safe surgical experience.    Failure  to obtain your pre-op physical will lead to cancellation of your surgery  Call them right away to schedule this. Please ensure your Preoperative Physical is faxed to the Hospital (numbers listed above)    [] Preoperative Medication Instructions  We would like you to stop your anticoagulation medications 3-5 days before surgery HOWEVER contact your prescribing provider for instructions before discontinuing any medications. (Examples: Coumadin, Plavix, Xarelto, Eliquis, Pradaxa, Effient, Brilinta) If you are on Coumadin, we would like the goal INR <= 1.2.  IT IS OK TO STAY ON ASPIRIN PRIOR TO SURGERY.   Hold Ibuprofen 24 hours prior.   Hold Herbal Supplements and vitamins 14 days prior.   Stop Cialis, Levitra and Viagra 2-3 days before surgery.   Please discuss with your primary care provider if you need to hold any blood pressure medications or others.   If you take these diabetic medications, please discuss with your primary doctor and follow the hold instructions:   []  Hold seven (7) days prior for once weekly  injectable doses [semaglutide (Ozempic, Wegovy), dulaglutide (Trulicity), exenatide ER (Bydureon), tirzepatide (Mounjaro)]  []  Hold the day before and day of for once daily injectable GLP-1 agonists [exenatide (Byetta), liraglutide (Saxenda, Victoza)]  []  Hold seven (7) days for oral semaglutide (Rybelsus)     [] Fasting Requirements  Nothing to eat for 8 hours before surgery unless instructed differently by the surgery nurse.  You may have clear liquids up to two hours before your arrival time (coffee, tea, water, or Gatorade. No cream or milk)  If your primary care provider has instructed you to continue oral medications, you may take them on the morning of surgery with a small sip of water.    No alcohol or smoking after 12:00am the day of surgery    [] Contact your insurance regarding coverage  If you would like a Good [facility] Estimate for your upcoming procedure at [facility], contact Cost of Care Estimates   Advocates are available be phone Monday through Friday 9am - 3pm   at [phone]  You may also submit a request online at [email or web address]. Select the Pricing Form under the Pricing Inquiry Section.    For all self-pay, estimate, or anesthesia billing questions at [facility], the contact information is below:    Who to contact: Ngoc SMITH  [facility] Network number: [phone]  Prepay number: [phone]    The billing office at the [facility] will contact you with the facility charge estimate but you may also reach them at [phone] with questions.    [] DO NOT BRING FMLA WITH TO SURGERY.  These should be sent to the provider's office by fax to [phone].    [] Day of Surgery  Medications - Take as indicated with sips of water.   Wear comfortable loose fitting clothes. Wear your glasses-Not contacts. Do not wear jewelry and remove body piercing's. Surgery may be  cancelled if they are not removed.   You may have 1 family member wait in the lobby during your surgery. Visitor restrictions are subject to change. Please verify with the surgery nurse when they call.   If same day surgery-Have a someone come with you to surgery that can help you understand the surgeon's instructions, drive you home and stay with you overnight the first night.    [] You will receive a call from a surgery nurse 1-3 days prior to surgery. They will go over more details with you.         Frequently Asked Questions    WHAT DO I DO WHEN I COME HOME FROM SURGERY?    After surgery and/ or your hospital stay you may be tired and drowsy. Rest, but make sure to get up and walk around every couple of hours to circulate your blood. If you are non-weight bearing do not put any weight on your foot.  Be sure to take your medications as directed. Try to get a restful sleep and begin more normal activities as tolerated.    HOW MUCH PAIN SHOULD I EXPECT AND WILL I HAVE PAIN MEDICATION?    Everyone tolerates pain differently. Still, each type of surgery involves a certain level and type of pain. Generally most people feel better within a few days but keep in mind that everyone has a different tolerance to pain. All medications should be taken as directed. All medications can have side effects such as bleeding, upset stomach, headaches, dizziness, constipation, etc. If you have any major problems or allergic reaction, stop the medication and call the office. If you only have a little pain, you may simply take Tylenol or Ibuprofen as directed on the label.     WHAT ABOUT MY DRESSING AND WHEN DO I COME BACK TO THE OFFICE?    The outer dressing stays in place for 7 days and when you come in for your first post-op we will remove it.  Some patients may have staples, zipper or sutures; these stay in for 10-14 days and will be removed at your 2nd post-op visit. After 24 hours you may shower using shower precautions.    WHAT  ABOUT SWELLING, REDNESS, BRUISING OR DRAINAGE?    It is normal to have some swelling, and bruising after surgery. It gradually subsides but may be present for several weeks. Elevation and icing will help to reduce the swelling more rapidly.  If your bandage is really tight after 24 hours you may cut the bandage an inch by the toes or under your foot and by your ankle.  Ice therapy often works better than oral pain medication. Using cold therapy is recommended every hour for 20 minutes.    WHEN CAN I TAKE A BATH?    You can take a bath as long as the wound has no drainage and is fully healed without a scab. This generally takes about 2 weeks.  Unless you get the bandaged protector from above then you can take a shower when you feel up to it.    WHEN CAN I RETURN TO WORK?    You may return to work to an office-type job whenever you feel comfortable enough. The only restriction would be your own motivation and pain tolerance. If your job requires vigorous activities you may be off 1-4 weeks which is determined by what surgery you have and your operating physician.     WHAT ABOUT DRIVING OR FLYING?    As a general rule, you may resume driving as soon as you are comfortable and fully alert and off narcotic pain medications. If you are traveling by plane within 4 weeks of surgery, perform range of motion exercises of the legs and feet during the flight. Get up and walk around frequently to prevent blood clots.      WHEN CAN I EXERSICE?    You may return to normal activities such as exercising when your surgeon tells you usually 2 weeks. Walking, sitting, standing and going up the stairs are all fine after the 2-week bruno. You may have restrictions for 1-4 weeks of no lifting, pushing, pulling in excess of 20 lbs. This is determined by what surgery you have and your surgeon.    WILL I BECOME ADDICTED TO MY PAIN MEDICATION?    Pain medications are safe and effective when used as directed. However, misuse of these products  can be extremely harmful and even deadly. Pain medications must be taken only as directed by your surgeon. Do not change the dose of your pain medication without talking to your operating physician first.    POSTOPERATIVE INFORMATION: MANAGING PAIN  Measuring Your Pain    A pain scale helps you rate pain intensity. In the scale, 0 means no pain, and 10 is the worst pain possible.  You should rate your pain every 4-6 hours. You may feel some pain even with medications. It is important to tell your health care provider if medications don't reduce the pain.        Managing Post-Op Pain at Home: Medications    Pain after an operation (post-op pain) is common and expected. These guidelines can help you stay as comfortable as possible.    Taking Pain Medications    Take any prescribed pain medications as directed by your doctor.  Take pain medications with some food to avoid an upset stomach.  Be aware that narcotic pain medications cause constipation. We recommend taking Milk of Magnesia   Eating high-fiber foods and drink plenty of water.  Don t drink alcohol while using pain medications.  Possible side effects include stomach upset, nausea, and itching.    Alternating Ibuprofen with your pain medication    Ibuprofen has three actions: it reduces fever, relieves pain and fights inflammation. Alternate between your prescribed pain medication and Ibuprofen every three hours (i.e., take a dose of Ibuprofen then three hours later take your prescribed pain medication then three hours later Ibuprofen, ect.) These two medications are safe to use together like this.    POSTOPERATIVE INFORMATION: CONSTIPATION    Constipation after surgery is a common problem and is often related to taking post-operative narcotic pain medication. Signs that you may be constipated include bloating, abdominal distention and/or pain.    Constipation Prevention & Treatment    Drink plenty of fluids! This is the most important thing you can do to help  prevent constipation.  Increase physical activity as recommended by your surgeon.  Consume a high fiber diet. We recommend introducing high fiber foods pre-operatively. Some foods high in fiber include:    Oatmeal Bran  Flaxseed Dried Fruit    Carrots   Bananas Strawberries Oranges Whole Grain Bread     Bananas Prunes  Pears  Raspberries     Over the counter medication/supplements - in order of recommended treatment:   (Be sure to follow instructions on product packaging)    Fiber supplement   Bulk forming laxative - Can be used to prevent constipation  Great food sources of fiber include but are not limited to:  Colace (docusate sodium)  Osmotic stool softener - Can be used 2-3 times per day to prevent constipation  Milk of Magnesia  MIRALAX  Enema/Suppository    Patient can also  some probiotics such as Culturelle to help prevent Antibiotic associated diarrhea. They can take this 1 hour before or 2 hours after taking their antibiotic should not be taken at the same time as they can cancel out the effects.                          ** AFTER SURGERY INSTRUCTIONS **                          [] You are to remain LIMITED WEIGHT BEARING on your left and right foot LIMITED WEIGHT BEARING MEANS THAT IT IS ONLY OKAY FOR YOU TO APPLY LIGHT PRESSURE ON THE AFFECTED FOOT WHEN TRANSFERRING FROM YOUR ASSISTIVE DEVICE TO A CHAIR OR BED.    [] During surgery   will apply a gauze dressing to your foot. This will remain intact until you are seen in clinic for follow up    [] It is NOT OKAY to get your surgical site wet while bathing, you will need to purchase a cast cover to protect your foot from getting wet. You can purchase this at [facility] or your local pharmacy.    [] It is important that you elevate your affected foot after surgery. Proper elevation is raising your foot above your waist. The fluid in your lower extremities needs to get back to your heart so it can get pumped to your kidneys and  expelled through urination. So if you notice you have to go to the bathroom more frequently when you are elevating your leg this is a good sign that it is working.     [] It is important that you increase your protein intake after surgery. Protein is essential for wound healing. We recommend you take a protein supplement twice per day. This is in addition to your normal diet. Examples of protein supplements are Ensure, Boost, Glucerna (if you are diabetic), or protein powder. You can purchase these at your local retailer or grocery store.       Notify our office right away, if you have any changes in your health status, or if you develop a cold, flu, diarrhea, infection, fever or sore throat before your scheduled surgery date. We can be reached at [phone]   Monday-Friday 8 am - 4:30 pm if you have any questions.     Thank you for trusting us with your care!      For informational purposes only. Not to replace the advice of your health care provider. Copyright   2020 [facility]. All rights reserved. Clinically reviewed by Infection Prevention and the [facility] COVID-19 Clinical Team. Anywhere to Go 907590 - Rev 09/09/21.      Can be found at [employer], [employer], or [employer]

## 2024-11-06 NOTE — PATIENT INSTRUCTIONS
You were seen in the ENT Clinic today by Dr. Reid. If you have any questions or concerns after your appointment, please contact us (see below)       2.   Continue your current regimen. Plan to return to the ENT clinic in 4 months.           How to Contact Us:  Send a [employer] message to your provider. Our team will respond to you via [employer]. Occasionally, we will need to call you to get further information.  For urgent matters (Monday-Friday), call the ENT Clinic: [phone] and speak with a call center team member - they will route your call appropriately.   If you'd like to speak directly with a nurse, please find our contact information below. We do our best to check voicemail frequently throughout the day, and will work to call you back within 1-2 days. For urgent matters, please use the general clinic phone numbers listed above.     Anupama ARAUJO RN  Direct: [phone]  Amy GIBSON LPN  Direct: [phone]         [facility]  Department of Otolaryngology

## 2024-11-06 NOTE — Clinical Note
11/6/2024      Humberto Pino  [address]  [place] [address]      Dear Colleague,    Thank you for referring your patient, Humberto Pino, to the [facility]. Please see a copy of my visit note below.    Left heel getting better but left getting toe worse, can get up to 4-5 pain level. Left great toe arthritis. Also has pain related to ingrown toenail. Not taking pain meds. He thinks that he is also getting arthritis but not as bad as the left toe.      Again, thank you for allowing me to participate in the care of your patient.        Sincerely,        Jourdan Christianson DPM

## 2024-11-06 NOTE — PROGRESS NOTES
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    10/26/23: Humberto Pino is a 67 year old male who presents for follow up. No new issues. Very happy with his care      5/1/24: He reports that he COVID around January 2nd with mild symptoms such as fatigue and gained 15 lbs, but reports no other big changes since his last visit.    11/6/24: Today, he reports that he is doing well. He reports that he has been doing his nasal rinses regularly. He states that he has an upcoming toe surgery.    Sino-Nasal Outcome Test (SNOT - 22)  1. Need to Blow Nose: (Patient-Rptd) (P) Very mild  2. Nasal Blockage: (Patient-Rptd) (P) None  3. Sneezing: (Patient-Rptd) (P) Very mild  4. Runny Nose: (Patient-Rptd) (P) Very mild  5. Cough: (Patient-Rptd) (P) None  6. Post-nasal discharge: (Patient-Rptd) (P) None  7. Thick nasal discharge: (Patient-Rptd) (P) None  8. Ear fullness: (Patient-Rptd) (P) Very mild  9. Dizziness: (Patient-Rptd) (P) None  10. Ear Pain: (Patient-Rptd) (P) None  11. Facial pain/pressure: (Patient-Rptd) (P) None  12. Decreased Sense of Smell/Taste: (Patient-Rptd) (P) Mild or slight  13. Difficulty falling asleep: (Patient-Rptd) (P) None  14. Wake up at night: (Patient-Rptd) (P) Mild or slight  15. Lack of a good night's sleep: (Patient-Rptd) (P) None  16. Wake up tired: (Patient-Rptd) (P) None  17. Fatigue: (Patient-Rptd) (P) Very mild  18. Reduced Productivity: (Patient-Rptd) (P) None  19. Reduced Concentration: (Patient-Rptd) (P) None  20. Frustrated/restless/irritable: (Patient-Rptd) (P) None  21. Sad: (Patient-Rptd) (P) None  22. 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    Findings: This patient who had extensive tumor formation that was growing within the maxillary sinus into the nasal cavity. He also had tumor, which was growing along the infraorbital nerve canal and it was filling the space lateral to the infraorbital nerve canal.     Review of systems: A 14-point review of systems has been conducted and is negative for any notable symptoms, except as dictated in the history of present illness.     Physical Exam:  Vital signs: There were no vitals taken for this visit.   General Appearance: No acute distress, appropriate demeanor, conversant  Eyes: moist conjunctivae; EOMI; pupils symmetric; visual acuity grossly intact; no proptosis  Head: normocephalic; overall symmetric appearance without deformity  Face: overall symmetric without deformity  Ears: Normal appearance of external ear  Nose: No external deformity  Oral Cavity/oropharynx: Normal appearance of mucosa  Neck: no palpable lymphadenopathy; thyroid without palpable nodules  Lungs: symmetric chest rise; no wheezing  CV: Good distal perfusion; normal hear rate  Extremities: No deformity  Neurologic Exam: Cranial nerves II-XII are grossly intact      Procedure Note  Procedure performed: Rigid nasal endoscopy  Indication: To evaluate for sinonasal pathology not visualized on routine anterior rhinoscopy  Anesthesia: 4% topical lidocaine with 0.05 % oxymetazoline  Description of procedure: A 30 degree, 3 mm rigid endoscope was inserted into bilateral nasal cavities and the nasal valves, nasal cavity, middle meatus, sphenoethmoid recess, nasopharynx were evaluated for evidence of obstruction, edema, purulence, polyps and/or mass/lesion.     Sullivan-Nestor Endoscopic Scoring  System  Endoscopic observation Right Left   Polyps in middle meatus (0 = absent, 1 = restricted to middle meatus, 2 = Beyond middle meatus) 0 0   Discharge (0 = absent, 1 = thin and clear, 2 = thick, purulent) 0 0   Edema (0 = absent, 1 = mild-moderate, 2 = moderate-severe) 0 0   Crusting (0 = absent, 1 = mild-moderate, 2 = moderate-severe) 0 0   Scarring (0= absent, 1 = mild-moderate, 2 = moderate-severe) 0 0   Total 0 0     Findings  RT: no evidence of recurrent papilloma  LT: normal exam without concern for infection    The patient tolerated the procedure well without complication.     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 His right nostril shows no signs of new tumor growth. Questions and concerns were addressed.     Plan:  Continue sinus rinses  Follow-up with me in 4 months, after this appointment follow-up every 6 months pending no recurrence.    Scribe Disclosure:   I, Nelli Smith, am serving as a scribe; to document services personally performed by Steven Reid MD -based on data collection and the provider's statements to me.     Provider Disclosure:  I agree with above History, Review of Systems, Physical exam and Plan.  I have reviewed the content of the documentation and have edited it as needed. I have personally performed the services documented here and the documentation accurately represents those services and the decisions I have made.      Electronically signed by:    Steven Reid MD    [facility]  Rhinology, Endoscopic Skull Base Surgery  [facility]  Department of Otolaryngology - Head & Neck  Surgery    ~~~~~~~~~~~~~~~~~~~~~~~~~~~~~~~~~~~~~~~~~~~~~~~~~~~~~~~~~~~~~~~~~~~~~~~~~~~~~~~~~~~~~~~~~~~~~~~~~~~~~~~~~~~~~~~~~~~~~~~~~~~~~~~~~~~~~~~    Past Medical History:   Diagnosis Date    Arthritis 01/01/2016    Chronic maxillary sinusitis     Diverticulosis of colon (without mention of hemorrhage)     repeat 2012    ADINA (obstructive sleep apnea)     Tinnitus         Past Surgical History:   Procedure Laterality Date    ARTHROSCOPY KNEE  08/04/2011    Procedure:ARTHROSCOPY KNEE; With debridement of medial and lateral meniscus  ; Surgeon:AMARILIS PETERS; Location: OR    ENDOSCOPIC ENDONASAL SURGERY Left 2/13/2023    Procedure: LEFT ENDOSCOPIC IMAGE-GUIDED MAXILLARY ANTROSTOMY WITH TISSUE REMOVAL;  Surgeon: Chet Cornejo MD;  Location: [facility] OR    HERNIA REPAIR, UMBILICAL      OPTICAL TRACKING SYSTEM ENDOSCOPIC SINUS SURGERY Right 2/13/2023    Procedure: RIGHT ENDOSCOPIC, IMAGE-GUIDED MEDIAL MAXILLECTOMY VIA DENKER'S APPROACH;  Surgeon: Chet Cornejo MD;  Location: [facility] OR    right thumb, index finger, middle finger and ring finger trigger release  2021    TOE SURGERY      feb 2006    ZZC REPAIR CRUCIATE LIGAMENT,KNEE      both knees-ACL        Family History   Problem Relation Age of Onset    Arthritis Mother         osteoporosis    Respiratory Mother         on oxygen-was smoker, COPD    Osteoporosis Mother         significant problem    Cerebrovascular Disease Father         Age 85-87    C.A.D. Father         Rapid heart rates    Other - See Comments Father 62        petuitary tumor    Cerebrovascular Disease Paternal Grandmother         Age about 80    Arthritis Paternal Grandmother     Cerebrovascular Disease Paternal Grandfather         age 94    Anesthesia Reaction No family hx of     Venous thrombosis No family hx of         Social History     Socioeconomic History    Marital status:      Spouse name: Manasa    Number of children: 2    Years of education: 23   Occupational History     Occupation: teacher     Employer: [employer]   Tobacco Use    Smoking status: Never     Passive exposure: Past    Smokeless tobacco: Never   Vaping Use    Vaping status: Never Used   Substance and Sexual Activity    Alcohol use: Yes     Comment: once/week    Drug use: No    Sexual activity: Yes     Partners: Female     Birth control/protection: None     Comment: none needed   Other Topics Concern    Parent/sibling w/ CABG, MI or angioplasty before 65F 55M? No   Social History Narrative    Dairy/d 2-3 servings/d.     Caffeine 0-3 servings/d    Exercise 3-4 x week    Sunscreen used - Yes    Seatbelts used - Yes    Working smoke/CO detectors in the home - Yes    Guns stored in the home - No    Self Breast Exams - NOT APPLICABLE    Self Testicular Exam - Yes    Eye Exam up to date - Yes    Dental Exam up to date - Yes    Pap Smear up to date - NOT APPLICABLE    Mammogram up to date - NOT APPLICABLE    PSA up to date - NOT APPLICABLE    Dexa Scan up to date - NOT APPLICABLE    Flex Sig / Colonoscopy up to date - Yes    Immunizations up to date - Yes    Abuse: Current or Past(Physical, Sexual or Emotional)- No    Do you feel safe in your environment - Yes        07/2010                 Social Drivers of Health     Financial Resource Strain: Low Risk  (2/29/2024)    Financial Resource Strain     Within the past 12 months, have you or your family members you live with been unable to get utilities (heat, electricity) when it was really needed?: No   Food Insecurity: Low Risk  (2/29/2024)    Food Insecurity     Within the past 12 months, did you worry that your food would run out before you got money to buy more?: No     Within the past 12 months, did the food you bought just not last and you didn t have money to get more?: No   Transportation Needs: Low Risk  (2/29/2024)    Transportation Needs     Within the past 12 months, has lack of transportation kept you from medical appointments, getting your medicines,  non-medical meetings or appointments, work, or from getting things that you need?: No   Physical Activity: Sufficiently Active (2/29/2024)    Exercise Vital Sign     Days of Exercise per Week: 6 days     Minutes of Exercise per Session: 30 min   Stress: Stress Concern Present (2/29/2024)    [place] of [employer] - Occupational Stress Questionnaire     Feeling of Stress : To some extent   Social Connections: Unknown (2/29/2024)    Social Connection and Isolation Panel [NHANES]     Frequency of Social Gatherings with Friends and Family: Once a week   Interpersonal Safety: Low Risk  (3/6/2024)    Interpersonal Safety     Do you feel physically and emotionally safe where you currently live?: Yes     Within the past 12 months, have you been hit, slapped, kicked or otherwise physically hurt by someone?: No     Within the past 12 months, have you been humiliated or emotionally abused in other ways by your partner or ex-partner?: No   Housing Stability: Low Risk  (2/29/2024)    Housing Stability     Do you have housing? : Yes     Are you worried about losing your housing?: No

## 2024-11-06 NOTE — PROGRESS NOTES
Left heel getting better but left getting toe worse, can get up to 4-5 pain level. Left great toe arthritis. Also has pain related to ingrown toenail. Not taking pain meds. He thinks that he is also getting arthritis but not as bad as the left toe.

## 2024-11-06 NOTE — NURSING NOTE
"Chief Complaint   Patient presents with    RECHECK   Blood pressure (!) 166/92, pulse 68, height 1.842 m (6' 0.5\"), weight 111.4 kg (245 lb 9.6 oz), SpO2 97%. Shakeel Collado, EMT    "

## 2024-11-07 ENCOUNTER — TELEPHONE (OUTPATIENT)
Dept: PODIATRY | Facility: CLINIC | Age: 69
End: 2024-11-07
Payer: COMMERCIAL

## 2024-11-07 ENCOUNTER — HOSPITAL ENCOUNTER (OUTPATIENT)
Facility: AMBULATORY SURGERY CENTER | Age: 69
End: 2024-11-07
Attending: PODIATRIST
Payer: COMMERCIAL

## 2024-11-07 PROBLEM — M20.21 HALLUX RIGIDUS OF BOTH FEET: Status: ACTIVE | Noted: 2024-11-06

## 2024-11-07 PROBLEM — M20.22 HALLUX RIGIDUS OF BOTH FEET: Status: ACTIVE | Noted: 2024-11-06

## 2024-11-07 NOTE — TELEPHONE ENCOUNTER
Surgery scheduled with Dr. Christianson at [facility] on 11/18/2024    Procedure: First metatarsal phalangeal joint implant arthroplasty left foot     CPT Code: 75851    Email sent to Bibi Hernandez to [place].    Patient was directed to schedule a pre-op physical.    Post ops scheduled.    Medications reviewed in clinic  Summary sent via [employer].

## 2024-11-08 ENCOUNTER — MYC MEDICAL ADVICE (OUTPATIENT)
Dept: PODIATRY | Facility: CLINIC | Age: 69
End: 2024-11-08
Payer: COMMERCIAL

## 2024-11-13 ENCOUNTER — TRANSFERRED RECORDS (OUTPATIENT)
Dept: HEALTH INFORMATION MANAGEMENT | Facility: CLINIC | Age: 69
End: 2024-11-13
Payer: COMMERCIAL

## 2024-11-14 ENCOUNTER — TRANSFERRED RECORDS (OUTPATIENT)
Dept: HEALTH INFORMATION MANAGEMENT | Facility: CLINIC | Age: 69
End: 2024-11-14

## 2024-11-20 ENCOUNTER — VIRTUAL VISIT (OUTPATIENT)
Dept: INTERNAL MEDICINE | Facility: CLINIC | Age: 69
End: 2024-11-20
Payer: COMMERCIAL

## 2024-11-20 DIAGNOSIS — M79.672 LEFT FOOT PAIN: Primary | ICD-10-CM

## 2024-11-20 PROCEDURE — 99213 OFFICE O/P EST LOW 20 MIN: CPT | Mod: 95 | Performed by: INTERNAL MEDICINE

## 2024-11-20 NOTE — PROGRESS NOTES
"Christian is a 69 year old who is being evaluated via a billable video visit.    How would you like to obtain your AVS? MyChart  If the video visit is dropped, the invitation should be resent by: Send to e-mail at: [email or web address]  Will anyone else be joining your video visit? No      Assessment & Plan     (E75.264) Left foot pain  (primary encounter diagnosis)  Comment: chronic, was considering surgery though now has had some relief with insert and nsaids.  Plan: discussed appropriate and safe nsaid use.             BMI  Estimated body mass index is 32.85 kg/m  as calculated from the following:    Height as of 11/6/24: 1.842 m (6' 0.5\").    Weight as of 11/6/24: 111.4 kg (245 lb 9.6 oz).             Subjective   Christian is a 69 year old, presenting for the following health issues:  Follow Up (Discuss recommendation on whether patient should scheduled foot surgery)        11/20/2024     8:21 AM   Additional Questions   Roomed by [employer]     Video Start Time:  4:38pm    History of Present Illness       Reason for visit:  Toe issues. Podiatrist 1 recommends surgery. Second opinion: do not get surgery. Discussion: Surgery? (Probably not) and plus and minus of Aleve, Advil, etc. Wish to discuss weight loss strategies.    He eats 4 or more servings of fruits and vegetables daily.He consumes 1 sweetened beverage(s) daily.He exercises with enough effort to increase his heart rate 20 to 29 minutes per day.  He exercises with enough effort to increase his heart rate 4 days per week.   He is taking medications regularly.                   Objective           Vitals:  No vitals were obtained today due to virtual visit.    Physical Exam   GENERAL: alert and no distress  EYES: Eyes grossly normal to inspection.  No discharge or erythema, or obvious scleral/conjunctival abnormalities.  RESP: No audible wheeze, cough, or visible cyanosis.    SKIN: Visible skin clear. No significant rash, abnormal pigmentation or lesions.  NEURO: " Cranial nerves grossly intact.  Mentation and speech appropriate for age.  PSYCH: Appropriate affect, tone, and pace of words          Video-Visit Details    Type of service:  Video Visit   Video End Time:4:55 PM  Originating Location (pt. Location): Home    Distant Location (provider location):  On-site  Platform used for Video Visit: Nery  Signed Electronically by: Rajiv Ludwig MD

## 2024-11-25 DIAGNOSIS — Z01.10 ENCOUNTER FOR HEARING TEST: Primary | ICD-10-CM

## 2025-01-07 ENCOUNTER — MYC MEDICAL ADVICE (OUTPATIENT)
Dept: OTOLARYNGOLOGY | Facility: CLINIC | Age: 70
End: 2025-01-07
Payer: COMMERCIAL

## 2025-01-08 ENCOUNTER — TELEPHONE (OUTPATIENT)
Dept: OTOLARYNGOLOGY | Facility: CLINIC | Age: 70
End: 2025-01-08
Payer: COMMERCIAL

## 2025-01-08 NOTE — TELEPHONE ENCOUNTER
Patient confirmed scheduled appointment:     Date: 1/13/25  Time: 7:50AM  Appointment Type: Return Ear  Provider: Anya Rosas NP   Location: [facility]  Testing/imaging:   Additional Notes:

## 2025-01-13 ENCOUNTER — OFFICE VISIT (OUTPATIENT)
Dept: OTOLARYNGOLOGY | Facility: CLINIC | Age: 70
End: 2025-01-13
Payer: COMMERCIAL

## 2025-01-13 VITALS
HEART RATE: 83 BPM | WEIGHT: 248 LBS | DIASTOLIC BLOOD PRESSURE: 91 MMHG | SYSTOLIC BLOOD PRESSURE: 166 MMHG | HEIGHT: 72 IN | BODY MASS INDEX: 33.59 KG/M2

## 2025-01-13 DIAGNOSIS — H61.23 EXCESSIVE CERUMEN IN EAR CANAL, BILATERAL: Primary | ICD-10-CM

## 2025-01-13 PROCEDURE — 92504 EAR MICROSCOPY EXAMINATION: CPT | Performed by: REGISTERED NURSE

## 2025-01-13 PROCEDURE — 99212 OFFICE O/P EST SF 10 MIN: CPT | Mod: 25 | Performed by: REGISTERED NURSE

## 2025-01-13 NOTE — PROGRESS NOTES
Otolaryngology Clinic  January 13, 2025    HPI:  Humberto Pino is here for an ear cleaning prior to audiogram scheduled 1/23/24.  History of normal hearing bilaterally.  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 Cornejo on 2/13/23.    Today, patient states that they are doing well.  Scheduled an ear cleaning prior to audiogram to be sure ear canals are clean prior to testing.  Patient denies any change in hearing.  Denies otalgia or otorrhea.  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    Otologic microscope exam:    Biocular Microscopy exam is needed due to deep impaction of cerumen of bilateral ears, requiring direct visualization for use of cleaning instruments.    Right ear was examined under the microscope.  Cerumen in canal noted. It was cleaned with suction. Once cleaned, TM visualized under microscope. Normal appearing TM, nicely aerated middle ear space.     Left ear was also examined under the microscope.   Cerumen in canal noted. It was cleaned with suction. Once cleaned, TM visualized under microscope. Normal appearing TM, nicely aerated middle ear space.     Assessment and Plan:  1. Excessive cerumen in ear canal, bilateral (Primary)  Patient presents for ear cleaning of bilateral ears prior to audiogram.  The patient's ears are cleaned today.  Healthy exam after cleaning. May proceed with audiogram as scheduled. Return as needed for cleaning.     Will chart check audiogram and send a message with follow up recommendations.     Anya Rosas DNP, APRN, CNP  Otolaryngology  Head & Neck Surgery  [phone]    10 minutes spent on the date of the encounter doing chart review, history and exam, documentation and further activities per the note excluding time performing ear cleaning under microscopy.

## 2025-01-13 NOTE — LETTER
1/13/2025       RE: Humberto Pino  [address]  [place] [address]     Dear Colleague,    Thank you for referring your patient, Humberto Pino, to the [facility] [place] [facility]. Please see a copy of my visit note below.      Otolaryngology Clinic  January 13, 2025    HPI:  Humberto Pino is here for an ear cleaning prior to audiogram scheduled 1/23/24.  History of normal hearing bilaterally.  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 Cornejo on 2/13/23.    Today, patient states that they are doing well.  Scheduled an ear cleaning prior to audiogram to be sure ear canals are clean prior to testing.  Patient denies any change in hearing.  Denies otalgia or otorrhea.  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    Otologic microscope exam:    Biocular Microscopy exam is needed due to deep impaction of cerumen of bilateral ears, requiring direct visualization for use of cleaning instruments.    Right ear was examined under the microscope.  Cerumen in canal noted. It was cleaned with suction. Once cleaned, TM visualized under microscope. Normal appearing TM, nicely aerated middle ear space.     Left ear was also examined under the microscope.   Cerumen in canal noted. It was cleaned with suction. Once cleaned, TM visualized under microscope. Normal appearing TM, nicely aerated middle ear space.     Assessment and Plan:  1. Excessive cerumen in ear canal, bilateral (Primary)  Patient presents for ear cleaning of bilateral ears prior to audiogram.  The patient's ears are cleaned today.  Healthy exam after cleaning. May proceed with audiogram as scheduled. Return as needed for cleaning.     Will chart check audiogram and send a message with follow up recommendations.     Anya Rosas DNP, APRN, CNP  Otolaryngology  Head &  Neck Surgery  [phone]    10 minutes spent on the date of the encounter doing chart review, history and exam, documentation and further activities per the note excluding time performing ear cleaning under microscopy.      Again, thank you for allowing me to participate in the care of your patient.      Sincerely,    [place] Rosas NP

## 2025-01-14 NOTE — PROGRESS NOTES
AUDIOLOGY REPORT    SUBJECTIVE:  Humberto Pino is a 69 year old male who was seen on 1/23/25 in the Audiology Clinic at the [facility] for audiologic evaluation, ordered by Steven Reid MD from ENT Clinic.     The patient had inverted papilloma which was surgically removed 2/13/23.  The patient has been seen previously in this clinic on 12/13/23 for assessment and results indicated normal hearing in the right ear and normal/borderline normal hearing in the left ear.  The patient is a  and notes some difficulty hearing in noise.  He notes occasional bilateral tinnitus.  Humberto does a daily sinus rinse as advised by ENT.  He denies pain, drainage from ears, aural fullness, dizziness, history of ear surgery, or history of noise exposure.  He had an ear cleaning by Anya Rosas NP in ENT on 1/13/25.      OBJECTIVE:  Abuse Screening:  Do you feel unsafe at home or work/school? No  Do you feel threatened by someone? No  Does anyone try to keep you from having contact with others, or doing things outside of your home? No  Physical signs of abuse present? No     Fall Risk Screen:  1. Have you fallen two or more times in the past year? No  2. Have you fallen and had an injury in the past year? No    Timed Up and Go Score (in seconds): not tested  Is patient a fall risk? No  Referral initiated: No  Fall Risk Assessment Completed by Audiology    Otoscopic exam indicates ears are clear of cerumen bilaterally.       Pure Tone Thresholds assessed using conventional audiometry with good reliability from 250-8000 Hz bilaterally using insert earphones and circumaural headphones.      RIGHT:  normal/borderline normal hearing     LEFT:  normal hearing except at 8000 Hz where there is a mild presumed sensorineural hearing loss    Tympanogram:    RIGHT: normal eardrum mobility     LEFT:   normal eardrum mobility     Reflexes (reported by stimulus ear):  RIGHT: Ipsilateral is  present at normal levels   RIGHT: Contralateral is present at elevated levels   LEFT:   Ipsilateral is present at normal levels   LEFT:   Contralateral is present at normal levels       Speech Reception Threshold:    RIGHT: 10 dB HL    LEFT:   10 dB HL  Word Recognition Score:     RIGHT: 100% at 50 dB HL using NU-6 recorded word list.    LEFT:   100% at 50 dB HL using NU-6 recorded word list.      ASSESSMENT:  Sensorineural hearing loss at 8000 Hz in left ear with unrestricted hearing of right ear.       Compared to patient's previous audiogram dated 12/13/23, hearing and word recognition are stable except for a 10 dB decrease at 8000 Hz in the right ear.  Today s results were discussed with the patient in detail.     PLAN:   Results will be sent to Dr. Reid and Anya Rosas NP in ENT Clinic for review.   Patient should return for hearing testing at intervals advised by ENT or sooner if changes in hearing are noted.    Encouraged use of hearing protection.     The patient expressed understanding and agreement with this plan.    Rebeka Davison, CCC-A, [facility]  Licensed Audiologist  [place] #[id number]    Cc Steven Reid MD  Cc Anya Rosas, SHI

## 2025-01-23 ENCOUNTER — OFFICE VISIT (OUTPATIENT)
Dept: AUDIOLOGY | Facility: CLINIC | Age: 70
End: 2025-01-23
Payer: COMMERCIAL

## 2025-01-23 DIAGNOSIS — H90.42 SENSORINEURAL HEARING LOSS (SNHL) OF LEFT EAR WITH UNRESTRICTED HEARING OF RIGHT EAR: Primary | ICD-10-CM

## 2025-01-23 DIAGNOSIS — H93.13 TINNITUS OF BOTH EARS: ICD-10-CM

## 2025-03-06 ENCOUNTER — OFFICE VISIT (OUTPATIENT)
Dept: OTOLARYNGOLOGY | Facility: CLINIC | Age: 70
End: 2025-03-06
Payer: COMMERCIAL

## 2025-03-06 VITALS
HEIGHT: 73 IN | SYSTOLIC BLOOD PRESSURE: 157 MMHG | WEIGHT: 246.8 LBS | BODY MASS INDEX: 32.71 KG/M2 | OXYGEN SATURATION: 97 % | HEART RATE: 73 BPM | DIASTOLIC BLOOD PRESSURE: 88 MMHG

## 2025-03-06 DIAGNOSIS — D36.9 INVERTED PAPILLOMA: ICD-10-CM

## 2025-03-06 DIAGNOSIS — J34.89 NASAL OBSTRUCTION: ICD-10-CM

## 2025-03-06 ASSESSMENT — PAIN SCALES - GENERAL: PAINLEVEL_OUTOF10: NO PAIN (0)

## 2025-03-06 NOTE — PATIENT INSTRUCTIONS
You were seen in the ENT Clinic today by Dr. Reid. If you have any questions or concerns after your appointment, please contact us (see below)       2.   The following recommendations have been made based upon your appointment today:   -      3.   Plan to return to the ENT clinic            How to Contact Us:  Send a [employer] message to your provider. Our team will respond to you via TATE'S LIST. Occasionally, we will need to call you to get further information.  For urgent matters (Monday-Friday), call the ENT Clinic: [phone] and speak with a call center team member - they will route your call appropriately.   If you'd like to speak directly with a nurse, please find our contact information below. We do our best to check voicemail frequently throughout the day, and will work to call you back within 1-2 days. For urgent matters, please use the general clinic phone numbers listed above.     Anupama ARAUJO RN  Direct: [phone]  Amy GIBSON LPN  Direct: [phone]         [facility]  Department of Otolaryngology

## 2025-03-06 NOTE — LETTER
3/6/2025       RE: Humberto Pino  [address]  [place] [address]     Dear Colleague,    Thank you for referring your patient, Humberto Pino, to the [facility] [place] at [facility]. Please see a copy of my visit note below.      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 No new issues. Very happy with his care     Interval History 5/1/24:  He reports that he COVID around January 2nd with mild symptoms such as fatigue and gained 15 lbs, but reports no other big changes since his last visit.     Interval History 11/6/24:  Today, he reports that he is doing well. He reports that he has been doing his nasal rinses regularly. He states that he has an upcoming toe surgery.     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 He has started using a different CPAP, and now notices a difference with improved nasal symptoms. He plans to travel to [place] soon to visit his son, who is an  at a large firm.    Sino-Nasal Outcome Test (SNOT - 22)  1. Need to Blow Nose: (Patient-Rptd) (P) Mild or slight  2. Nasal Blockage: (Patient-Rptd) (P) Very mild  3. Sneezing: (Patient-Rptd) (P) Very mild  4. Runny Nose: (Patient-Rptd) (P) Very mild  5. Cough: (Patient-Rptd) (P) None  6. Post-nasal discharge: (Patient-Rptd) (P) Very mild  7. Thick nasal discharge: (Patient-Rptd) (P) Very mild  8. Ear fullness: (Patient-Rptd) (P) Very mild  9. Dizziness:  "(Patient-Rptd) (P) None  10. Ear Pain: (Patient-Rptd) (P) None  11. Facial pain/pressure: (Patient-Rptd) (P) None  12. Decreased Sense of Smell/Taste: (Patient-Rptd) (P) Very mild  13. Difficulty falling asleep: (Patient-Rptd) (P) None  14. Wake up at night: (Patient-Rptd) (P) Very mild  15. Lack of a good night's sleep: (Patient-Rptd) (P) Very mild  16. Wake up tired: (Patient-Rptd) (P) None  17. Fatigue: (Patient-Rptd) (P) None  18. Reduced Productivity: (Patient-Rptd) (P) None  19. Reduced Concentration: (Patient-Rptd) (P) Very mild  20. Frustrated/restless/irritable: (Patient-Rptd) (P) None  21. Sad: (Patient-Rptd) (P) Very mild  22. Embarrassed: (Patient-Rptd) (P) Very mild  Total Score: (Patient-Rptd) (P) 14    [place] [facility]  2/13/23 Dr. Edenilson Cornejo  1.  Left endoscopic maxillary antrostomy.  2.  Left endoscopic partial ethmoidectomy.  3.  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  4.  Computerized image guidance, extradural.    Review of systems: A 14-point review of systems has been conducted and is negative for any notable symptoms, except as dictated in the history of present illness.     Physical Exam:  Vital signs: BP (!) 157/88 (BP Location: Right arm, Patient Position: Sitting, Cuff Size: Adult Regular)   Pulse 73   Ht 1.842 m (6' 0.5\")   Wt 111.9 kg (246 lb 12.8 oz)   SpO2 97%   BMI 33.01 kg/m     General Appearance: No acute distress, appropriate demeanor, conversant  Eyes: moist conjunctivae; EOMI; pupils symmetric; visual acuity grossly intact; no proptosis  Head: normocephalic; overall symmetric appearance without deformity  Face: overall symmetric without deformity  Ears: Normal appearance of external ear  Nose: No external deformity  Oral Cavity/oropharynx: Normal appearance of mucosa  Neck: no palpable lymphadenopathy; thyroid without palpable nodules  Lungs: " symmetric chest rise; no wheezing  CV: Good distal perfusion; normal hear rate  Extremities: No deformity  Neurologic Exam: Cranial nerves II-XII are grossly intact      Procedure Note  Procedure performed: Rigid nasal endoscopy  Indication: To evaluate for sinonasal pathology not visualized on routine anterior rhinoscopy  Anesthesia: 4% topical lidocaine with 0.05 % oxymetazoline  Description of procedure: A 30 degree, 3 mm rigid endoscope was inserted into bilateral nasal cavities and the nasal valves, nasal cavity, middle meatus, sphenoethmoid recess, nasopharynx were evaluated for evidence of obstruction, edema, purulence, polyps and/or mass/lesion.     Joey-Nestor Endoscopic Scoring System  Endoscopic observation Right Left   Polyps in middle meatus (0 = absent, 1 = restricted to middle meatus, 2 = Beyond middle meatus) 0 0   Discharge (0 = absent, 1 = thin and clear, 2 = thick, purulent) 0 0   Edema (0 = absent, 1 = mild-moderate, 2 = moderate-severe) 0 0   Crusting (0 = absent, 1 = mild-moderate, 2 = moderate-severe) 0 0   Scarring (0= absent, 1 = mild-moderate, 2 = moderate-severe) 0 0   Total 0 0     Findings  RT: No evidence of recurrent papilloma.  LT: Normal exam without concern for infection.    The patient tolerated the procedure well without complication.     Assessment/Medical Decision Making:  Humberto Pino is a 69 year old male who presents for follow up. Discussed that the nose likes to retain swelling, so it may take up to 6 months to see improvement after recent Mohs for SCC of the nasal tip. Today's exam is stable and within normal limits, with no evidence of recurrent disease. Discussed monitoring guidelines with plan to decrease check-ins to every 6 months for the next 2 years as long as there are no signs of recurrence. He can continue with regular ear cleanings with Anya Rosas NP.    Plan:  Resume sinus rinses with neti pot as tolerated.  Follow up in 6 months.    Scribe Disclosure:    I, Ayla Galvez, am serving as a scribe to document services personally performed by Steven Reid MD based on data collection and the provider's statements to me.     Provider Disclosure:  I agree with above History, Review of Systems, Physical exam and Plan. I have reviewed the content of the documentation and have edited it as needed. I have personally performed the services documented here and the documentation accurately represents those services and the decisions I have made.      Electronically signed by:    Steven Reid MD    [facility]  Rhinology, Endoscopic Skull Base Surgery  [facility]  Department of Otolaryngology - Head & Neck Surgery    ~~~~~~~~~~~~~~~~~~~~~~~~~~~~~~~~~~~~~~~~~~~~~~~~~~~~~~~~~~~~~~~~~~~~~~~~~~~~~~~~~~~~~~~~~~~~~~~~~~~~~~~~~~~~~~~~~~~~~~~~~~~~~~~~~~~~~~~    Past Medical History:   Diagnosis Date     Arthritis 01/01/2016     Chronic maxillary sinusitis      Diverticulosis of colon (without mention of hemorrhage)     repeat 2012     ADINA (obstructive sleep apnea)      Tinnitus         Past Surgical History:   Procedure Laterality Date     ARTHROSCOPY KNEE  08/04/2011    Procedure:ARTHROSCOPY KNEE; With debridement of medial and lateral meniscus  ; Surgeon:AMARILIS PETERS; Location: OR     ENDOSCOPIC ENDONASAL SURGERY Left 2/13/2023    Procedure: LEFT ENDOSCOPIC IMAGE-GUIDED MAXILLARY ANTROSTOMY WITH TISSUE REMOVAL;  Surgeon: Chet Cornejo MD;  Location: [facility] OR     HERNIA REPAIR, UMBILICAL       OPTICAL TRACKING SYSTEM ENDOSCOPIC SINUS SURGERY Right 2/13/2023    Procedure: RIGHT ENDOSCOPIC, IMAGE-GUIDED MEDIAL MAXILLECTOMY VIA DENKER'S APPROACH;  Surgeon: Chet Cornejo MD;  Location: [facility] OR     right thumb, index finger, middle finger and ring finger trigger release  2021     TOE SURGERY      feb 2006     ZZC REPAIR CRUCIATE LIGAMENT,KNEE      both knees-ACL        Family History   Problem Relation Age of Onset     Arthritis  Mother         osteoporosis     Respiratory Mother         on oxygen-was smoker, COPD     Osteoporosis Mother         significant problem     Cerebrovascular Disease Father         Age 85-87     C.A.D. Father         Rapid heart rates     Other - See Comments Father 62        petuitary tumor     Cerebrovascular Disease Paternal Grandmother         Age about 80     Arthritis Paternal Grandmother      Cerebrovascular Disease Paternal Grandfather         age 94     Anesthesia Reaction No family hx of      Venous thrombosis No family hx of         Social History     Socioeconomic History     Marital status:      Spouse name: Manasa     Number of children: 2     Years of education: 23   Occupational History     Occupation: teacher     Employer: [employer]   Tobacco Use     Smoking status: Never     Passive exposure: Past     Smokeless tobacco: Never   Vaping Use     Vaping status: Never Used   Substance and Sexual Activity     Alcohol use: Yes     Comment: once/week     Drug use: No     Sexual activity: Yes     Partners: Female     Birth control/protection: None     Comment: none needed   Other Topics Concern     Parent/sibling w/ CABG, MI or angioplasty before 65F 55M? No   Social History Narrative    Dairy/d 2-3 servings/d.     Caffeine 0-3 servings/d    Exercise 3-4 x week    Sunscreen used - Yes    Seatbelts used - Yes    Working smoke/CO detectors in the home - Yes    Guns stored in the home - No    Self Breast Exams - NOT APPLICABLE    Self Testicular Exam - Yes    Eye Exam up to date - Yes    Dental Exam up to date - Yes    Pap Smear up to date - NOT APPLICABLE    Mammogram up to date - NOT APPLICABLE    PSA up to date - NOT APPLICABLE    Dexa Scan up to date - NOT APPLICABLE    Flex Sig / Colonoscopy up to date - Yes    Immunizations up to date - Yes    Abuse: Current or Past(Physical, Sexual or Emotional)- No    Do you feel safe in your environment - Yes        07/2010                 Social  Drivers of Health     Financial Resource Strain: Low Risk  (2/29/2024)    Financial Resource Strain      Within the past 12 months, have you or your family members you live with been unable to get utilities (heat, electricity) when it was really needed?: No   Food Insecurity: Low Risk  (2/29/2024)    Food Insecurity      Within the past 12 months, did you worry that your food would run out before you got money to buy more?: No      Within the past 12 months, did the food you bought just not last and you didn t have money to get more?: No   Transportation Needs: Low Risk  (2/29/2024)    Transportation Needs      Within the past 12 months, has lack of transportation kept you from medical appointments, getting your medicines, non-medical meetings or appointments, work, or from getting things that you need?: No   Physical Activity: Sufficiently Active (2/29/2024)    Exercise Vital Sign      Days of Exercise per Week: 6 days      Minutes of Exercise per Session: 30 min   Stress: Stress Concern Present (2/29/2024)    [place] [employer] - Occupational Stress Questionnaire      Feeling of Stress : To some extent   Social Connections: Unknown (2/29/2024)    Social Connection and Isolation Panel [NHANES]      Frequency of Social Gatherings with Friends and Family: Once a week   Interpersonal Safety: Low Risk  (3/6/2024)    Interpersonal Safety      Do you feel physically and emotionally safe where you currently live?: Yes      Within the past 12 months, have you been hit, slapped, kicked or otherwise physically hurt by someone?: No      Within the past 12 months, have you been humiliated or emotionally abused in other ways by your partner or ex-partner?: No   Housing Stability: Low Risk  (2/29/2024)    Housing Stability      Do you have housing? : Yes      Are you worried about losing your housing?: No          Again, thank you for allowing me to participate in the care of your patient.       Sincerely,    Steven Reid MD

## 2025-03-06 NOTE — NURSING NOTE
"Chief Complaint   Patient presents with    RECHECK   Blood pressure (!) 157/88, pulse 73, height 1.842 m (6' 0.5\"), weight 111.9 kg (246 lb 12.8 oz), SpO2 97%. Shakeel Collado, EMT    "

## 2025-03-06 NOTE — PROGRESS NOTES
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 No new issues. Very happy with his care     Interval History 5/1/24:  He reports that he COVID around January 2nd with mild symptoms such as fatigue and gained 15 lbs, but reports no other big changes since his last visit.     Interval History 11/6/24:  Today, he reports that he is doing well. He reports that he has been doing his nasal rinses regularly. He states that he has an upcoming toe surgery.     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 He has started using a different CPAP, and now notices a difference with improved nasal symptoms. He plans to travel to [place] soon to visit his son, who is an  at a large firm.    Sino-Nasal Outcome Test (SNOT - 22)  1. Need to Blow Nose: (Patient-Rptd) (P) Mild or slight  2. Nasal Blockage: (Patient-Rptd) (P) Very mild  3. Sneezing: (Patient-Rptd) (P) Very mild  4. Runny Nose: (Patient-Rptd) (P) Very mild  5. Cough: (Patient-Rptd) (P) None  6. Post-nasal discharge: (Patient-Rptd) (P) Very mild  7. Thick nasal discharge: (Patient-Rptd) (P) Very mild  8. Ear fullness: (Patient-Rptd) (P) Very mild  9. Dizziness: (Patient-Rptd) (P) None  10. Ear Pain: (Patient-Rptd) (P) None  11. Facial pain/pressure: (Patient-Rptd) (P) None  12. Decreased Sense of Smell/Taste: (Patient-Rptd) (P) Very mild  13. Difficulty falling asleep: (Patient-Rptd) (P) None  14. Wake up at night: (Patient-Rptd) (P) Very mild  15. Lack of a good night's sleep:  "(Patient-Rptd) (P) Very mild  16. Wake up tired: (Patient-Rptd) (P) None  17. Fatigue: (Patient-Rptd) (P) None  18. Reduced Productivity: (Patient-Rptd) (P) None  19. Reduced Concentration: (Patient-Rptd) (P) Very mild  20. Frustrated/restless/irritable: (Patient-Rptd) (P) None  21. Sad: (Patient-Rptd) (P) Very mild  22. Embarrassed: (Patient-Rptd) (P) Very mild  Total Score: (Patient-Rptd) (P) 14    [place] Operative [facility]  2/13/23 Dr. Edenilson Cornejo  1.  Left endoscopic maxillary antrostomy.  2.  Left endoscopic partial ethmoidectomy.  3.  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  4.  Computerized image guidance, extradural.    Review of systems: A 14-point review of systems has been conducted and is negative for any notable symptoms, except as dictated in the history of present illness.     Physical Exam:  Vital signs: BP (!) 157/88 (BP Location: Right arm, Patient Position: Sitting, Cuff Size: Adult Regular)   Pulse 73   Ht 1.842 m (6' 0.5\")   Wt 111.9 kg (246 lb 12.8 oz)   SpO2 97%   BMI 33.01 kg/m     General Appearance: No acute distress, appropriate demeanor, conversant  Eyes: moist conjunctivae; EOMI; pupils symmetric; visual acuity grossly intact; no proptosis  Head: normocephalic; overall symmetric appearance without deformity  Face: overall symmetric without deformity  Ears: Normal appearance of external ear  Nose: No external deformity  Oral Cavity/oropharynx: Normal appearance of mucosa  Neck: no palpable lymphadenopathy; thyroid without palpable nodules  Lungs: symmetric chest rise; no wheezing  CV: Good distal perfusion; normal hear rate  Extremities: No deformity  Neurologic Exam: Cranial nerves II-XII are grossly intact      Procedure Note  Procedure performed: Rigid nasal endoscopy  Indication: To evaluate for sinonasal pathology not visualized on routine anterior " rhinoscopy  Anesthesia: 4% topical lidocaine with 0.05 % oxymetazoline  Description of procedure: A 30 degree, 3 mm rigid endoscope was inserted into bilateral nasal cavities and the nasal valves, nasal cavity, middle meatus, sphenoethmoid recess, nasopharynx were evaluated for evidence of obstruction, edema, purulence, polyps and/or mass/lesion.     Joey-Nestor Endoscopic Scoring System  Endoscopic observation Right Left   Polyps in middle meatus (0 = absent, 1 = restricted to middle meatus, 2 = Beyond middle meatus) 0 0   Discharge (0 = absent, 1 = thin and clear, 2 = thick, purulent) 0 0   Edema (0 = absent, 1 = mild-moderate, 2 = moderate-severe) 0 0   Crusting (0 = absent, 1 = mild-moderate, 2 = moderate-severe) 0 0   Scarring (0= absent, 1 = mild-moderate, 2 = moderate-severe) 0 0   Total 0 0     Findings  RT: No evidence of recurrent papilloma.  LT: Normal exam without concern for infection.    The patient tolerated the procedure well without complication.     Assessment/Medical Decision Making:  Humberto Pino is a 69 year old male who presents for follow up. Discussed that the nose likes to retain swelling, so it may take up to 6 months to see improvement after recent Mohs for SCC of the nasal tip. Today's exam is stable and within normal limits, with no evidence of recurrent disease. Discussed monitoring guidelines with plan to decrease check-ins to every 6 months for the next 2 years as long as there are no signs of recurrence. He can continue with regular ear cleanings with Anya Rosas NP.    Plan:  Resume sinus rinses with neti pot as tolerated.  Follow up in 6 months.    Scribe Disclosure:   I, Ayla Galvez, am serving as a scribe to document services personally performed by Steven Reid MD based on data collection and the provider's statements to me.     Provider Disclosure:  I agree with above History, Review of Systems, Physical exam and Plan. I have reviewed the content of the documentation and  have edited it as needed. I have personally performed the services documented here and the documentation accurately represents those services and the decisions I have made.      Electronically signed by:    Steven Reid MD    [facility]  Rhinology, Endoscopic Skull Base Surgery  [facility]  Department of Otolaryngology - Head & Neck Surgery    ~~~~~~~~~~~~~~~~~~~~~~~~~~~~~~~~~~~~~~~~~~~~~~~~~~~~~~~~~~~~~~~~~~~~~~~~~~~~~~~~~~~~~~~~~~~~~~~~~~~~~~~~~~~~~~~~~~~~~~~~~~~~~~~~~~~~~~~    Past Medical History:   Diagnosis Date    Arthritis 01/01/2016    Chronic maxillary sinusitis     Diverticulosis of colon (without mention of hemorrhage)     repeat 2012    ADINA (obstructive sleep apnea)     Tinnitus         Past Surgical History:   Procedure Laterality Date    ARTHROSCOPY KNEE  08/04/2011    Procedure:ARTHROSCOPY KNEE; With debridement of medial and lateral meniscus  ; Surgeon:AMARILIS PETERS; Location: OR    ENDOSCOPIC ENDONASAL SURGERY Left 2/13/2023    Procedure: LEFT ENDOSCOPIC IMAGE-GUIDED MAXILLARY ANTROSTOMY WITH TISSUE REMOVAL;  Surgeon: Chet Cornejo MD;  Location: [facility] [place]    HERNIA REPAIR, UMBILICAL      OPTICAL TRACKING SYSTEM ENDOSCOPIC SINUS SURGERY Right 2/13/2023    Procedure: RIGHT ENDOSCOPIC, IMAGE-GUIDED MEDIAL MAXILLECTOMY VIA DENKER'S APPROACH;  Surgeon: Chet Cornejo MD;  Location: [facility] [place]    right thumb, index finger, middle finger and ring finger trigger release  2021    TOE SURGERY      feb 2006    ZZC REPAIR CRUCIATE LIGAMENT,KNEE      both knees-ACL        Family History   Problem Relation Age of Onset    Arthritis Mother         osteoporosis    Respiratory Mother         on oxygen-was smoker, COPD    Osteoporosis Mother         significant problem    Cerebrovascular Disease Father         Age 85-87    C.A.D. Father         Rapid heart rates    Other - See Comments Father 62        petuitary tumor    Cerebrovascular Disease Paternal  Grandmother         Age about 80    Arthritis Paternal Grandmother     Cerebrovascular Disease Paternal Grandfather         age 94    Anesthesia Reaction No family hx of     Venous thrombosis No family hx of         Social History     Socioeconomic History    Marital status:      Spouse name: Manasa    Number of children: 2    Years of education: 23   Occupational History    Occupation: teacher     Employer: [employer]   Tobacco Use    Smoking status: Never     Passive exposure: Past    Smokeless tobacco: Never   Vaping Use    Vaping status: Never Used   Substance and Sexual Activity    Alcohol use: Yes     Comment: once/week    Drug use: No    Sexual activity: Yes     Partners: Female     Birth control/protection: None     Comment: none needed   Other Topics Concern    Parent/sibling w/ CABG, MI or angioplasty before 65F 55M? No   Social History Narrative    Dairy/d 2-3 servings/d.     Caffeine 0-3 servings/d    Exercise 3-4 x week    Sunscreen used - Yes    Seatbelts used - Yes    Working smoke/CO detectors in the home - Yes    Guns stored in the home - No    Self Breast Exams - NOT APPLICABLE    Self Testicular Exam - Yes    Eye Exam up to date - Yes    Dental Exam up to date - Yes    Pap Smear up to date - NOT APPLICABLE    Mammogram up to date - NOT APPLICABLE    PSA up to date - NOT APPLICABLE    Dexa Scan up to date - NOT APPLICABLE    Flex Sig / Colonoscopy up to date - Yes    Immunizations up to date - Yes    Abuse: Current or Past(Physical, Sexual or Emotional)- No    Do you feel safe in your environment - Yes        07/2010                 Social Drivers of Health     Financial Resource Strain: Low Risk  (2/29/2024)    Financial Resource Strain     Within the past 12 months, have you or your family members you live with been unable to get utilities (heat, electricity) when it was really needed?: No   Food Insecurity: Low Risk  (2/29/2024)    Food Insecurity     Within the past 12  months, did you worry that your food would run out before you got money to buy more?: No     Within the past 12 months, did the food you bought just not last and you didn t have money to get more?: No   Transportation Needs: Low Risk  (2/29/2024)    Transportation Needs     Within the past 12 months, has lack of transportation kept you from medical appointments, getting your medicines, non-medical meetings or appointments, work, or from getting things that you need?: No   Physical Activity: Sufficiently Active (2/29/2024)    Exercise Vital Sign     Days of Exercise per Week: 6 days     Minutes of Exercise per Session: 30 min   Stress: Stress Concern Present (2/29/2024)    [place] [employer] - Occupational Stress Questionnaire     Feeling of Stress : To some extent   Social Connections: Unknown (2/29/2024)    Social Connection and Isolation Panel [NHANES]     Frequency of Social Gatherings with Friends and Family: Once a week   Interpersonal Safety: Low Risk  (3/6/2024)    Interpersonal Safety     Do you feel physically and emotionally safe where you currently live?: Yes     Within the past 12 months, have you been hit, slapped, kicked or otherwise physically hurt by someone?: No     Within the past 12 months, have you been humiliated or emotionally abused in other ways by your partner or ex-partner?: No   Housing Stability: Low Risk  (2/29/2024)    Housing Stability     Do you have housing? : Yes     Are you worried about losing your housing?: No

## 2025-03-11 SDOH — HEALTH STABILITY: PHYSICAL HEALTH: ON AVERAGE, HOW MANY DAYS PER WEEK DO YOU ENGAGE IN MODERATE TO STRENUOUS EXERCISE (LIKE A BRISK WALK)?: 4 DAYS

## 2025-03-11 SDOH — HEALTH STABILITY: PHYSICAL HEALTH: ON AVERAGE, HOW MANY MINUTES DO YOU ENGAGE IN EXERCISE AT THIS LEVEL?: 60 MIN

## 2025-03-11 ASSESSMENT — SOCIAL DETERMINANTS OF HEALTH (SDOH): HOW OFTEN DO YOU GET TOGETHER WITH FRIENDS OR RELATIVES?: TWICE A WEEK

## 2025-03-12 ENCOUNTER — OFFICE VISIT (OUTPATIENT)
Dept: INTERNAL MEDICINE | Facility: CLINIC | Age: 70
End: 2025-03-12
Payer: COMMERCIAL

## 2025-03-12 ENCOUNTER — TELEPHONE (OUTPATIENT)
Dept: INTERNAL MEDICINE | Facility: CLINIC | Age: 70
End: 2025-03-12

## 2025-03-12 VITALS
OXYGEN SATURATION: 98 % | DIASTOLIC BLOOD PRESSURE: 86 MMHG | HEIGHT: 72 IN | TEMPERATURE: 97.7 F | RESPIRATION RATE: 13 BRPM | SYSTOLIC BLOOD PRESSURE: 136 MMHG | BODY MASS INDEX: 33.18 KG/M2 | WEIGHT: 245 LBS | HEART RATE: 68 BPM

## 2025-03-12 DIAGNOSIS — E66.09 CLASS 1 OBESITY DUE TO EXCESS CALORIES WITHOUT SERIOUS COMORBIDITY IN ADULT, UNSPECIFIED BMI: ICD-10-CM

## 2025-03-12 DIAGNOSIS — Z00.00 ENCOUNTER FOR MEDICARE ANNUAL WELLNESS EXAM: Primary | ICD-10-CM

## 2025-03-12 DIAGNOSIS — G47.33 OBSTRUCTIVE SLEEP APNEA SYNDROME: ICD-10-CM

## 2025-03-12 DIAGNOSIS — Z12.5 SCREENING FOR PROSTATE CANCER: ICD-10-CM

## 2025-03-12 DIAGNOSIS — Z13.1 SCREENING FOR DIABETES MELLITUS: ICD-10-CM

## 2025-03-12 DIAGNOSIS — Z13.220 LIPID SCREENING: ICD-10-CM

## 2025-03-12 DIAGNOSIS — E66.811 CLASS 1 OBESITY DUE TO EXCESS CALORIES WITHOUT SERIOUS COMORBIDITY IN ADULT, UNSPECIFIED BMI: ICD-10-CM

## 2025-03-12 PROCEDURE — 91320 SARSCV2 VAC 30MCG TRS-SUC IM: CPT | Performed by: INTERNAL MEDICINE

## 2025-03-12 PROCEDURE — 1126F AMNT PAIN NOTED NONE PRSNT: CPT | Performed by: INTERNAL MEDICINE

## 2025-03-12 PROCEDURE — 90480 ADMN SARSCOV2 VAC 1/ONLY CMP: CPT | Performed by: INTERNAL MEDICINE

## 2025-03-12 PROCEDURE — 36415 COLL VENOUS BLD VENIPUNCTURE: CPT | Performed by: INTERNAL MEDICINE

## 2025-03-12 PROCEDURE — G0103 PSA SCREENING: HCPCS | Performed by: INTERNAL MEDICINE

## 2025-03-12 PROCEDURE — 80061 LIPID PANEL: CPT | Performed by: INTERNAL MEDICINE

## 2025-03-12 PROCEDURE — G0439 PPPS, SUBSEQ VISIT: HCPCS | Performed by: INTERNAL MEDICINE

## 2025-03-12 PROCEDURE — 3075F SYST BP GE 130 - 139MM HG: CPT | Performed by: INTERNAL MEDICINE

## 2025-03-12 PROCEDURE — 3079F DIAST BP 80-89 MM HG: CPT | Performed by: INTERNAL MEDICINE

## 2025-03-12 PROCEDURE — G2211 COMPLEX E/M VISIT ADD ON: HCPCS | Performed by: INTERNAL MEDICINE

## 2025-03-12 PROCEDURE — 99214 OFFICE O/P EST MOD 30 MIN: CPT | Mod: 25 | Performed by: INTERNAL MEDICINE

## 2025-03-12 ASSESSMENT — PAIN SCALES - GENERAL: PAINLEVEL_OUTOF10: NO PAIN (0)

## 2025-03-12 NOTE — PATIENT INSTRUCTIONS
Patient Education   Preventive Care Advice   This is general advice given by our system to help you stay healthy. However, your care team may have specific advice just for you. Please talk to your care team about your preventive care needs.  Nutrition  Eat 5 or more servings of fruits and vegetables each day.  Try wheat bread, brown rice and whole grain pasta (instead of white bread, rice, and pasta).  Get enough calcium and vitamin D. Check the label on foods and aim for 100% of the RDA (recommended daily allowance).  Lifestyle  Exercise at least 150 minutes each week  (30 minutes a day, 5 days a week).  Do muscle strengthening activities 2 days a week. These help control your weight and prevent disease.  No smoking.  Wear sunscreen to prevent skin cancer.  Have a dental exam and cleaning every 6 months.  Yearly exams  See your health care team every year to talk about:  Any changes in your health.  Any medicines your care team has prescribed.  Preventive care, family planning, and ways to prevent chronic diseases.  Shots (vaccines)   HPV shots (up to age 26), if you've never had them before.  Hepatitis B shots (up to age 59), if you've never had them before.  COVID-19 shot: Get this shot when it's due.  Flu shot: Get a flu shot every year.  Tetanus shot: Get a tetanus shot every 10 years.  Pneumococcal, hepatitis A, and RSV shots: Ask your care team if you need these based on your risk.  Shingles shot (for age 50 and up)  General health tests  Diabetes screening:  Starting at age 35, Get screened for diabetes at least every 3 years.  If you are younger than age 35, ask your care team if you should be screened for diabetes.  Cholesterol test: At age 39, start having a cholesterol test every 5 years, or more often if advised.  Bone density scan (DEXA): At age 50, ask your care team if you should have this scan for osteoporosis (brittle bones).  Hepatitis C: Get tested at least once in your life.  STIs (sexually  transmitted infections)  Before age 24: Ask your care team if you should be screened for STIs.  After age 24: Get screened for STIs if you're at risk. You are at risk for STIs (including HIV) if:  You are sexually active with more than one person.  You don't use condoms every time.  You or a partner was diagnosed with a sexually transmitted infection.  If you are at risk for HIV, ask about PrEP medicine to prevent HIV.  Get tested for HIV at least once in your life, whether you are at risk for HIV or not.  Cancer screening tests  Cervical cancer screening: If you have a cervix, begin getting regular cervical cancer screening tests starting at age 21.  Breast cancer scan (mammogram): If you've ever had breasts, begin having regular mammograms starting at age 40. This is a scan to check for breast cancer.  Colon cancer screening: It is important to start screening for colon cancer at age 45.  Have a colonoscopy test every 10 years (or more often if you're at risk) Or, ask your provider about stool tests like a FIT test every year or Cologuard test every 3 years.  To learn more about your testing options, visit:   .  For help making a decision, visit:   https://bit.ly/vn95617.  Prostate cancer screening test: If you have a prostate, ask your care team if a prostate cancer screening test (PSA) at age 55 is right for you.  Lung cancer screening: If you are a current or former smoker ages 50 to 80, ask your care team if ongoing lung cancer screenings are right for you.  For informational purposes only. Not to replace the advice of your health care provider. Copyright   2023 [facility]. All rights reserved. Clinically reviewed by the [facility]itions Program. [employer] 177223 - REV 01/24.  Learning About Activities of Daily Living  What are activities of daily living?     Activities of daily living (ADLs) are the basic self-care tasks you do every day. These include eating, bathing, dressing,  and moving around.  As you age, and if you have health problems, you may find that it's harder to do some of these tasks. If so, your doctor can suggest ideas that may help.  To measure what kind of help you may need, your doctor will ask how well you are able to do ADLs. Let your doctor know if there are any tasks that you are having trouble doing. This is an important first step to getting help. And when you have the help you need, you can stay as independent as possible.  How will a doctor assess your ADLs?  Asking about ADLs is part of a routine health checkup your doctor will likely do as you age. Your health check might be done in a doctor's office, in your home, or at a hospital. The goal is to find out if you are having any problems that could make it hard to care for yourself or that make it unsafe for you to be on your own.  To measure your ADLs, your doctor will ask how hard it is for you to do routine tasks. Your doctor may also want to know if you have changed the way you do a task because of a health problem. Your doctor may watch how you:  Walk back and forth.  Keep your balance while you stand or walk.  Move from sitting to standing or from a bed to a chair.  Button or unbutton a shirt or sweater.  Remove and put on your shoes.  It's common to feel a little worried or anxious if you find you can't do all the things you used to be able to do. Talking with your doctor about ADLs is a way to make sure you're as safe as possible and able to care for yourself as well as you can. You may want to bring a caregiver, friend, or family member to your checkup. They can help you talk to your doctor.  Follow-up care is a key part of your treatment and safety. Be sure to make and go to all appointments, and call your doctor if you are having problems. It's also a good idea to know your test results and keep a list of the medicines you take.  Current as of: October 24, 2023  Content Version: 14.3    2024 [facility]  [employer].   Care instructions adapted under license by your healthcare professional. If you have questions about a medical condition or this instruction, always ask your healthcare professional. [employer] disclaims any warranty or liability for your use of this information.    Hearing Loss: Care Instructions  Overview     Hearing loss is a sudden or slow decrease in how well you hear. It can range from slight to profound. Permanent hearing loss can occur with aging. It also can happen when you are exposed long-term to loud noise. Examples include listening to loud music, riding motorcycles, or being around other loud machines.  Hearing loss can affect your work and home life. It can make you feel lonely or depressed. You may feel that you have lost your independence. But hearing aids and other devices can help you hear better and feel connected to others.  Follow-up care is a key part of your treatment and safety. Be sure to make and go to all appointments, and call your doctor if you are having problems. It's also a good idea to know your test results and keep a list of the medicines you take.  How can you care for yourself at home?  Avoid loud noises whenever possible. This helps keep your hearing from getting worse.  Always wear hearing protection around loud noises.  Wear a hearing aid as directed.  A professional can help you pick a hearing aid that will work best for you.  You can also get hearing aids over the counter for mild to moderate hearing loss.  Have hearing tests as your doctor suggests. They can show whether your hearing has changed. Your hearing aid may need to be adjusted.  Use other devices as needed. These may include:  Telephone amplifiers and hearing aids that can connect to a television, stereo, radio, or microphone.  Devices that use lights or vibrations. These alert you to the doorbell, a ringing telephone, or a baby monitor.  Television closed-captioning. This shows the  "words at the bottom of the screen. Most new TVs can do this.  TTY (text telephone). This lets you type messages back and forth on the telephone instead of talking or listening. These devices are also called TDD. When messages are typed on the keyboard, they are sent over the phone line to a receiving TTY. The message is shown on a monitor.  Use text messaging, social media, and email if it is hard for you to communicate by telephone.  Try to learn a listening technique called speechreading. It is not lipreading. You pay attention to people's gestures, expressions, posture, and tone of voice. These clues can help you understand what a person is saying. Face the person you are talking to, and have them face you. Make sure the lighting is good. You need to see the other person's face clearly.  Think about counseling if you need help to adjust to your hearing loss.  When should you call for help?  Watch closely for changes in your health, and be sure to contact your doctor if:    You think your hearing is getting worse.     You have new symptoms, such as dizziness or nausea.   Where can you learn more?  Go to https://www.[employer].net/patiented  Enter R798 in the search box to learn more about \"Hearing Loss: Care Instructions.\"  Current as of: September 27, 2023  Content Version: 14.3    2024 [employer].   Care instructions adapted under license by your healthcare professional. If you have questions about a medical condition or this instruction, always ask your healthcare professional. [employer] disclaims any warranty or liability for your use of this information.    Bladder Training: Care Instructions  Your Care Instructions     Bladder training is used to treat urge incontinence and stress incontinence. Urge incontinence means that the need to urinate comes on so fast that you can't get to a toilet in time. Stress incontinence means that you leak urine because of pressure on your bladder. For " example, it may happen when you laugh, cough, or lift something heavy.  Bladder training can increase how long you can wait before you have to urinate. It can also help your bladder hold more urine. And it can give you better control over the urge to urinate.  It is important to remember that bladder training takes a few weeks to a few months to make a difference. You may not see results right away, but don't give up.  Follow-up care is a key part of your treatment and safety. Be sure to make and go to all appointments, and call your doctor if you are having problems. It's also a good idea to know your test results and keep a list of the medicines you take.  How can you care for yourself at home?  Work with your doctor to come up with a bladder training program that is right for you. You may use one or more of the following methods.  Delayed urination  In the beginning, try to keep from urinating for 5 minutes after you first feel the need to go.  While you wait, take deep, slow breaths to relax. Kegel exercises can also help you delay the need to go to the bathroom.  After some practice, when you can easily wait 5 minutes to urinate, try to wait 10 minutes before you urinate.  Slowly increase the waiting period until you are able to control when you have to urinate.  Scheduled urination  Empty your bladder when you first wake up in the morning.  Schedule times throughout the day when you will urinate.  Start by going to the bathroom every hour, even if you don't need to go.  Slowly increase the time between trips to the bathroom.  When you have found a schedule that works well for you, keep doing it.  If you wake up during the night and have to urinate, do it. Apply your schedule to waking hours only.  Kegel exercises  These tighten and strengthen pelvic muscles, which can help you control the flow of urine. (If doing these exercises causes pain, stop doing them and talk with your doctor.) To do Kegel  "exercises:  Squeeze your muscles as if you were trying not to pass gas. Or squeeze your muscles as if you were stopping the flow of urine. Your belly, legs, and buttocks shouldn't move.  Hold the squeeze for 3 seconds, then relax for 5 to 10 seconds.  Start with 3 seconds, then add 1 second each week until you are able to squeeze for 10 seconds.  Repeat the exercise 10 times a session. Do 3 to 8 sessions a day.  When should you call for help?  Watch closely for changes in your health, and be sure to contact your doctor if:    Your incontinence is getting worse.     You do not get better as expected.   Where can you learn more?  Go to https://www.[employer].net/patiented  Enter V684 in the search box to learn more about \"Bladder Training: Care Instructions.\"  Current as of: April 30, 2024  Content Version: 14.3    2024 [employer].   Care instructions adapted under license by your healthcare professional. If you have questions about a medical condition or this instruction, always ask your healthcare professional. "[employer] disclaims any warranty or liability for your use of this information.       "

## 2025-03-12 NOTE — PROGRESS NOTES
Preventive Care Visit  [facility]  Rajiv Ludwig MD, Internal Medicine  Mar 12, 2025      Assessment & Plan     (Z00.00) Encounter for Medicare annual wellness exam  (primary encounter diagnosis)  Comment: stable overall  Plan: Age appropriate cognitive and physical abilities.  Independent      (G47.33) Obstructive sleep apnea syndrome  Comment: cpap, regular use  Plan: continue current therapy    (E66.811,  E66.09) Class 1 obesity due to excess calories without serious comorbidity in adult, unspecified BMI  Comment: another 10 lbs weight gain, some stress ongoing from various areas of life.  Plan: encouraged to continue to watch portion control and keep up regular exercise (golf, walks, etc)    Covid booster today.    See me again in a year    The longitudinal plan of care for the diagnosis(es)/condition(s) as documented were addressed during this visit. Due to the added complexity in care, I will continue to support Christian in the subsequent management and with ongoing continuity of care.             BMI  Estimated body mass index is 33.23 kg/m  as calculated from the following:    Height as of this encounter: 1.829 m (6').    Weight as of this encounter: 111.1 kg (245 lb).       Counseling  Appropriate preventive services were addressed with this patient via screening, questionnaire, or discussion as appropriate for fall prevention, nutrition, physical activity, Tobacco-use cessation, social engagement, weight loss and cognition.  Checklist reviewing preventive services available has been given to the patient.  Reviewed patient's diet, addressing concerns and/or questions.   Patient reported safety concerns were addressed today.The patient was provided with written information regarding signs of hearing loss.   Information on urinary incontinence and treatment options given to patient.           Subjective   Christian is a 69 year old, presenting for the following:  Annual Visit (AWV/Mole surgery  on nose in January went well)        3/12/2025     8:43 AM   Additional Questions   Roomed by shady IVERSON  Pt is here for a wellness visit           Advance Care Planning  Patient does not have a Health Care Directive: Discussed advance care planning with patient; information given to patient to review.      3/11/2025   General Health   How would you rate your overall physical health? Good   Feel stress (tense, anxious, or unable to sleep) Only a little   (!) STRESS CONCERN      3/11/2025   Nutrition   Diet: Regular (no restrictions)         3/11/2025   Exercise   Days per week of moderate/strenous exercise 4 days   Average minutes spent exercising at this level 60 min         3/11/2025   Social Factors   Frequency of gathering with friends or relatives Twice a week   Worry food won't last until get money to buy more No   Food not last or not have enough money for food? No   Do you have housing? (Housing is defined as stable permanent housing and does not include staying ouside in a car, in a tent, in an abandoned building, in an overnight shelter, or couch-surfing.) Yes   Are you worried about losing your housing? No   Lack of transportation? Patient declined   Unable to get utilities (heat,electricity)? No         3/11/2025   Fall Risk   Fallen 2 or more times in the past year? No    No   Trouble with walking or balance? No    No       Multiple values from one day are sorted in reverse-chronological order          3/11/2025   Activities of Daily Living- Home Safety   Needs help with the following daily activites None of the above   Safety concerns in the home No grab bars in the bathroom         3/11/2025   Dental   Dentist two times every year? Yes         3/11/2025   Hearing Screening   Hearing concerns? (!) IT'S HARD TO FOLLOW A CONVERSATION IN A NOISY RESTAURANT OR CROWDED ROOM.         3/11/2025   Driving Risk Screening   Patient/family members have concerns about driving No         3/11/2025    General Alertness/Fatigue Screening   Have you been more tired than usual lately? No         3/11/2025   Urinary Incontinence Screening   Bothered by leaking urine in past 6 months Yes           2/29/2024   TB Screening   Were you born outside of the [place]? No           Today's PHQ-2 Score:       3/11/2025     9:49 AM   PHQ-2 ( 1999 [employer])   Q1: Little interest or pleasure in doing things 0   Q2: Feeling down, depressed or hopeless 0   PHQ-2 Score 0    Q1: Little interest or pleasure in doing things Not at all   Q2: Feeling down, depressed or hopeless Not at all   PHQ-2 Score 0       Patient-reported           3/11/2025   Substance Use   Alcohol more than 3/day or more than 7/wk No   Do you have a current opioid prescription? No   How severe/bad is pain from 1 to 10? 1/10   Do you use any other substances recreationally? No     Social History     Tobacco Use    Smoking status: Never     Passive exposure: Past    Smokeless tobacco: Never   Vaping Use    Vaping status: Never Used   Substance Use Topics    Alcohol use: Yes     Comment: once/week    Drug use: No           3/11/2025   AAA Screening   Family history of Abdominal Aortic Aneurysm (AAA)? No   Last PSA:   PSA   Date Value Ref Range Status   08/14/2013 1.81 0 - 4 ug/L Final     Prostate Specific Antigen Screen   Date Value Ref Range Status   03/06/2024 2.44 0.00 - 4.50 ng/mL Final     ASCVD Risk   The 10-year ASCVD risk score (Francisco WALTERS, et al., 2019) is: 14.7%    Values used to calculate the score:      Age: 69 years      Sex: Male      Is Non- : No      Diabetic: No      Tobacco smoker: No      Systolic Blood Pressure: 136 mmHg      Is BP treated: No      HDL Cholesterol: 52 mg/dL      Total Cholesterol: 153 mg/dL            Reviewed and updated as needed this visit by Provider                      Current providers sharing in care for this patient include:  Patient Care Team:  Rajiv Ludwig MD as PCP - General  (Internal Medicine)  Rajiv Ludwig MD as Assigned PCP  Ana Luisa Carlos MD as Assigned Pulmonology Provider  Steven Reid MD as Assigned Surgical Provider  Costa Mayer MD as Assigned Sleep Provider    The following health maintenance items are reviewed in Epic and correct as of today:  Health Maintenance   Topic Date Due    ANNUAL REVIEW OF HM ORDERS  03/01/2024    MEDICARE ANNUAL WELLNESS VISIT  03/06/2025    COVID-19 Vaccine (9 - 2024-25 season) 03/15/2025    FALL RISK ASSESSMENT  03/12/2026    GLUCOSE  03/06/2027    COLORECTAL CANCER SCREENING  07/05/2027    DTAP/TDAP/TD IMMUNIZATION (3 - Td or Tdap) 07/07/2027    LIPID  03/06/2029    ADVANCE CARE PLANNING  05/09/2029    HEPATITIS C SCREENING  Completed    PHQ-2 (once per calendar year)  Completed    INFLUENZA VACCINE  Completed    Pneumococcal Vaccine: 50+ Years  Completed    ZOSTER IMMUNIZATION  Completed    RSV VACCINE  Completed    HPV IMMUNIZATION  Aged Out    MENINGITIS IMMUNIZATION  Aged Out            Objective    Exam  /86 (BP Location: Left arm, Patient Position: Sitting, Cuff Size: Adult Large)   Pulse 68   Temp 97.7  F (36.5  C)   Resp 13   Ht 1.829 m (6')   Wt 111.1 kg (245 lb)   SpO2 98%   BMI 33.23 kg/m     Estimated body mass index is 33.23 kg/m  as calculated from the following:    Height as of this encounter: 1.829 m (6').    Weight as of this encounter: 111.1 kg (245 lb).    Physical Exam  GENERAL: alert and no distress  RESP: lungs clear to auscultation - no rales, rhonchi or wheezes  CV: regular rate and rhythm, normal S1 S2, no S3 or S4, no murmur, click or rub, no peripheral edema  ABDOMEN: soft, nontender, without hepatosplenomegaly or masses and chronic bulge/abd hernia. No changes.  NEURO: Normal strength and tone, mentation intact and speech normal  PSYCH: mentation appears normal, affect normal/bright        3/12/2025   Mini Cog   Clock Draw Score 2 Normal   3 Item Recall 3 objects recalled   Mini Cog Total Score 5               Signed Electronically by: Rajiv Ludwig MD

## 2025-03-12 NOTE — TELEPHONE ENCOUNTER
March 12, 2025    Outside records [facility].  Records were placed in the inbox for Dr. Ludwig to review.  A copy was sent to HIM to be scanned into the patient's chart.    Rossi Hopkins

## 2025-03-12 NOTE — TELEPHONE ENCOUNTER
March 12, 2025    Outside records received from [facility].  Records were placed in the inbox for Dr. Ludwig to review.  A copy was sent to HIM to be scanned into the patient's chart.    Rossi Hopkins

## 2025-03-12 NOTE — TELEPHONE ENCOUNTER
March 12, 2025    Outside records received from [facility].  Records were placed in the inbox for Dr. Ludwig to review.  A copy was sent to HIM to be scanned into the patient's chart.    Rossi Hopkins

## 2025-03-13 LAB
CHOLEST SERPL-MCNC: 158 MG/DL
FASTING STATUS PATIENT QL REPORTED: NO
HDLC SERPL-MCNC: 49 MG/DL
LDLC SERPL CALC-MCNC: 83 MG/DL
NONHDLC SERPL-MCNC: 109 MG/DL
PSA SERPL DL<=0.01 NG/ML-MCNC: 3.01 NG/ML (ref 0–4.5)
TRIGL SERPL-MCNC: 128 MG/DL

## 2025-03-15 LAB
ALBUMIN SERPL BCG-MCNC: 4.8 G/DL (ref 3.5–5.2)
ALP SERPL-CCNC: 60 U/L (ref 40–150)
ALT SERPL W P-5'-P-CCNC: 20 U/L (ref 0–70)
ANION GAP SERPL CALCULATED.3IONS-SCNC: 13 MMOL/L (ref 7–15)
AST SERPL W P-5'-P-CCNC: 26 U/L (ref 0–45)
BILIRUB SERPL-MCNC: 0.7 MG/DL
BUN SERPL-MCNC: 19.7 MG/DL (ref 8–23)
CALCIUM SERPL-MCNC: 10.3 MG/DL (ref 8.8–10.4)
CHLORIDE SERPL-SCNC: 101 MMOL/L (ref 98–107)
CREAT SERPL-MCNC: 0.95 MG/DL (ref 0.67–1.17)
EGFRCR SERPLBLD CKD-EPI 2021: 87 ML/MIN/1.73M2
FASTING STATUS PATIENT QL REPORTED: NO
GLUCOSE SERPL-MCNC: 102 MG/DL (ref 70–99)
HCO3 SERPL-SCNC: 26 MMOL/L (ref 22–29)
POTASSIUM SERPL-SCNC: 4.6 MMOL/L (ref 3.4–5.3)
PROT SERPL-MCNC: 8 G/DL (ref 6.4–8.3)
SODIUM SERPL-SCNC: 140 MMOL/L (ref 135–145)

## 2025-04-22 ENCOUNTER — NURSE TRIAGE (OUTPATIENT)
Dept: INTERNAL MEDICINE | Facility: CLINIC | Age: 70
End: 2025-04-22

## 2025-04-22 ENCOUNTER — OFFICE VISIT (OUTPATIENT)
Dept: FAMILY MEDICINE | Facility: CLINIC | Age: 70
End: 2025-04-22
Payer: COMMERCIAL

## 2025-04-22 VITALS
SYSTOLIC BLOOD PRESSURE: 127 MMHG | HEIGHT: 72 IN | DIASTOLIC BLOOD PRESSURE: 78 MMHG | HEART RATE: 59 BPM | RESPIRATION RATE: 16 BRPM | BODY MASS INDEX: 32.97 KG/M2 | OXYGEN SATURATION: 96 % | TEMPERATURE: 97.1 F | WEIGHT: 243.4 LBS

## 2025-04-22 DIAGNOSIS — R42 DIZZINESS: Primary | ICD-10-CM

## 2025-04-22 DIAGNOSIS — Z86.018 HISTORY OF INVERTED PAPILLOMA: ICD-10-CM

## 2025-04-22 PROCEDURE — 3074F SYST BP LT 130 MM HG: CPT

## 2025-04-22 PROCEDURE — 99214 OFFICE O/P EST MOD 30 MIN: CPT

## 2025-04-22 PROCEDURE — 1125F AMNT PAIN NOTED PAIN PRSNT: CPT

## 2025-04-22 PROCEDURE — G2211 COMPLEX E/M VISIT ADD ON: HCPCS

## 2025-04-22 PROCEDURE — 36415 COLL VENOUS BLD VENIPUNCTURE: CPT

## 2025-04-22 PROCEDURE — 3078F DIAST BP <80 MM HG: CPT

## 2025-04-22 PROCEDURE — 80048 BASIC METABOLIC PNL TOTAL CA: CPT

## 2025-04-22 RX ORDER — MECLIZINE HCL 12.5 MG 12.5 MG/1
12.5 TABLET ORAL 3 TIMES DAILY PRN
Qty: 30 TABLET | Refills: 0 | Status: SHIPPED | OUTPATIENT
Start: 2025-04-22

## 2025-04-22 ASSESSMENT — PAIN SCALES - GENERAL: PAINLEVEL_OUTOF10: MILD PAIN (2)

## 2025-04-22 NOTE — PROGRESS NOTES
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  Stable. DDx for Dizziness inclues the following: vertigo, orthostatic hypotension, hypoglycemia, hyponatremia, dehydration, middle ear infection, arrhythmia, TIA/stroke, structural lesions (primary or metastatic tumor, intracranial hemorrhage).      Suspect BPPV or other vertiginous etiology given he attests to room-spinning type dizziness.  Glucose is in process today, unlikely hypoglycemia.  Sodium is in process today, unlikely hypernatremia.  Vitals normal, no signs of dry mucous membranes.  Mild dehydration possible.    No concerns for cardiac etiology considering lack of palpitations, racing heart, or feeling as though her heart is skipping a beat  TMs both appear normal and patient no complaining of ear pain or fever to suggest otitis media.  No neurological findings to suggest TIA/Stroke.  Given lack of neurological findings, doubt structural lesions and I do not believe that imaging is indicated at this time.    Based on history and exam, the most likely etiology of this patient's dizziness is BPPV.  Patient is given meclizine, exercises for vertigo management, and recommendations for close monitoring for home.  Considering his history of inverted papilloma resection, I do think that this warrants close monitoring, and did recommend to him that he follow-up with ENT in the next 1 to 2 weeks if symptoms do not seem to be improving or resolving with the current plan of care.  Offered education on medications including appropriate dosing, possible side effects, and possible adverse effects.  Education given on return to clinic instructions as well as alarm signs that would require the need for immediate medical attention.  Patient attested to understanding.  Plan: meclizine (ANTIVERT) 12.5 MG tablet, Basic         metabolic panel  (Ca, Cl, CO2, Creat, Gluc, K,       "   Na, BUN)      This progress note has been dictated, with use of voice recognition software. Any grammatical, typographical, or context errors are unintentional and inherent to use of voice recognition software.     Ordering of each unique test  Prescription drug management  I spent a total of 18 minutes on the day of the visit.   Time spent by me today doing chart review, history and exam, documentation and further activities per the note      Follow-up   Follow-up in 1 week with ENT if symptoms are not improving    Patient Instructions  I believe vertigo is the cause of your symptoms.  To help with the symptoms, we need to help your body shift the crystals of the inner ear back to the proper position.  To do this, you need to try an exercise at home.  There is a maneuver called the \"half somersault\" that can be very effective in relieving the symptoms of vertigo.  The link for a video on how to perform this is below.  It may take several repetitions of of the maneuver.  https://Mobimedia.DNAtriX/rIH6l2HGpnz or go to YouTube and search Manasa Chavarria MD Vertigo treatment.  I have prescribed medication to help with the symptoms as well.  You may take this every 8 hours as needed, however it is not a replacement for trying the maneuvers to move the crystals back to the appropriate location in your inner ear.  Please follow up with your primary care provide in one week for a recheck of your symptoms.  If you develop severe worsening of the symptoms, fevers, weakness, vomiting not controlled by medication, or any other concerns, please be seen in the ER right away.    Benign Paroxysmal Positional Vertigo     Your health care provider may move your head in certain ways to treat your BPPV.     Benign paroxysmal positional vertigo (BPPV) is a problem with the inner ear. The inner ear contains the vestibular system. This system is what helps you keep your balance. BPPV causes a feeling of spinning. It is a common problem of the " vestibular system.  Understanding the vestibular system  The vestibular system of the ear is made up of very tiny parts. They include the utricle, saccule, and semicircular canals. The utricle is a tiny organ that contains calcium crystals. In some people, the crystals can move into the semicircular canals. When this happens, the system no longer works as it should. This causes BPPV. Benign means it is not life threatening. Paroxysmal means it happens suddenly. Positional means that it happens when you move your head. Vertigo is a feeling of spinning.  What causes BPPV?  Causes include injury to your head or neck. Other problems with the vestibular system may cause BPPV. In many people, the cause of BPPV is not known.  Symptoms of BPPV  You many have repeated feelings of spinning (vertigo). The vertigo usually lasts less than 1 minute. Some movements, such as rolling over in bed, can bring on vertigo.  Diagnosing BPPV  Your primary healthcare provider may diagnose and treat your BPPV. Or you may see an ear, nose, and throat doctor (otolaryngologist). In some cases, you may see a nervous system doctor (neurologist).  The healthcare provider will ask about your symptoms and your medical history. He or she will examine you. You may have hearing and balance tests. As part of the exam, your healthcare provider may have you move your head and body in certain ways. If you have BPPV, the movements can bring on vertigo. Your provider will also look for abnormal movements of your eyes. You may have other tests to check your vestibular or nervous systems.  Treatment for BPPV  Your healthcare provider may try to move the calcium crystals. This is done by having you move your head and neck in certain ways. This treatment is safe and often works well. You may also be told to do these movements at home. You may still have vertigo for a few weeks. Your healthcare provider will recheck your symptoms, usually in about a month.  Special physical therapy may also be part of treatment. In rare cases, surgery may be needed for BPPV that does not go away.     When to call the healthcare provider  Call your healthcare provider right away if you have any of these:  Symptoms that do not go away with treatment  Symptoms that get worse  New symptoms   Date Last Reviewed: 5/1/2017        Subjective   Christian is a 69 year old, presenting for the following health issues:  office visit (Pt reports they are here with the following concerns: slight dizziness, nighttime vertigo, R ear ache. Reports spinning sensation in back of head upon awakening at night and slight neck pain. Reports he would like both ears checked. Reports inverted papaloma surgery 2 years ago. [facility] - has seen them before and would be open to seeing them if it's needed. )        4/22/2025     2:33 PM   Additional Questions   Roomed by Eliseo   Accompanied by esteban         4/22/2025     2:33 PM   Patient Reported Additional Medications   Patient reports taking the following new medications none     History of Present Illness       Reason for visit:  Slight dizziness, nighttime vertigo, ear ache  Symptom onset:  1-2 weeks ago  Symptoms include:  At night: spinning sensation in back of head when I awaken. Day: Occasional light headedness or lack of balance. ear ache; slight neck pain  Symptom intensity:  Mild  Symptom progression:  Staying the same  Had these symptoms before:  Yes  Has tried/received treatment for these symptoms:  Yes  Previous treatment was successful:  Yes  Prior treatment description:  Ear infection resolved with antibiotics.  What makes it worse:  Standing in middle of night after waking up  What makes it better:  Lots of liquids and some caffeine   He is taking medications regularly.      Christian is a 69-year-old male with a past medical history significant for ADINA, chronic maxillary sinusitis, history of inverted papilloma status post resection in 2023, and arthritis  of the neck who presents today with concerns for new onset dizziness.  Patient reports that approximately 2 weeks ago he began to experience waxing and waning symptoms of intermittent dizziness with changes in position and movement of his head.  He describes this as though the room is spinning, and a spinning sensation of the back of his head.  He has felt a bit more dehydrated than usual, so has been working towards increasing his hydration with Pedialyte and Gatorade, and actually feels that this improves his symptoms significantly.  He does have some waxing and waning discomfort in bilateral ears that is not persistent, and is not accompanied by hearing loss, discharge from the ears, sinus pressure or congestion, sore throat, confusion, thunderclap headache, blurry or double vision, chest congestion, cough, or vomiting.  Patient declines any profound acute illness more recently, but did have an episode when he was traveling more recently where he had a bit of a runny nose and some very mild cold type symptoms.  He has also recently flown on a plane around April 4, but declines that plane flying generally exacerbates his vertigo.  He otherwise feels well, and mainly presents today to rule out other concerning causes of the ongoing vertigo considering his ENT history.  He does follow-up with ENT every 6 months for evaluation, but will not see them again until September.        Review of Systems  Constitutional, HEENT, cardiovascular, pulmonary, gi and gu systems are negative, except as otherwise noted.      Objective    /78 (BP Location: Left arm, Patient Position: Sitting, Cuff Size: Adult Regular)   Pulse 59   Temp 97.1  F (36.2  C) (Temporal)   Resp 16   Ht 1.829 m (6')   Wt 110.4 kg (243 lb 6.4 oz)   SpO2 96%   BMI 33.01 kg/m    Body mass index is 33.01 kg/m .  Physical Exam   GENERAL: alert and no distress  EYES: Eyes grossly normal to inspection, PERRL and conjunctivae and sclerae normal  HENT:  ear canals and TM's normal, nose and mouth without ulcers or lesions  NECK: no adenopathy, no asymmetry, masses, or scars  RESP: lungs clear to auscultation - no rales, rhonchi or wheezes  CV: regular rate and rhythm, normal S1 S2, no S3 or S4, no murmur, click or rub, no peripheral edema  ABDOMEN: soft, nontender, no hepatosplenomegaly, no masses and bowel sounds normal  MS: no gross musculoskeletal defects noted, no edema  NEURO: Normal strength and tone, mentation intact and speech normal. CN II-XII grossly intact    Diana Gutierres DNP FNP-C  Family Nurse Practitioner - Same Day Provider  [facility]        Signed Electronically by: MAYNOR Juares CNP

## 2025-04-22 NOTE — PATIENT INSTRUCTIONS
"I believe vertigo is the cause of your symptoms.  To help with the symptoms, we need to help your body shift the crystals of the inner ear back to the proper position.  To do this, you need to try an exercise at home.  There is a maneuver called the \"half somersault\" that can be very effective in relieving the symptoms of vertigo.  The link for a video on how to perform this is below.  It may take several repetitions of of the maneuver.  https://BringIt.Booshaka/oTL2s5FVngr or go to YouTube and search Manasa Chavarria MD Vertigo treatment.  I have prescribed medication to help with the symptoms as well.  You may take this every 8 hours as needed, however it is not a replacement for trying the maneuvers to move the crystals back to the appropriate location in your inner ear.  Please follow up with your primary care provide in one week for a recheck of your symptoms.  If you develop severe worsening of the symptoms, fevers, weakness, vomiting not controlled by medication, or any other concerns, please be seen in the ER right away.    Benign Paroxysmal Positional Vertigo     Your health care provider may move your head in certain ways to treat your BPPV.     Benign paroxysmal positional vertigo (BPPV) is a problem with the inner ear. The inner ear contains the vestibular system. This system is what helps you keep your balance. BPPV causes a feeling of spinning. It is a common problem of the vestibular system.  Understanding the vestibular system  The vestibular system of the ear is made up of very tiny parts. They include the utricle, saccule, and semicircular canals. The utricle is a tiny organ that contains calcium crystals. In some people, the crystals can move into the semicircular canals. When this happens, the system no longer works as it should. This causes BPPV. Benign means it is not life threatening. Paroxysmal means it happens suddenly. Positional means that it happens when you move your head. Vertigo is a feeling of " spinning.  What causes BPPV?  Causes include injury to your head or neck. Other problems with the vestibular system may cause BPPV. In many people, the cause of BPPV is not known.  Symptoms of BPPV  You many have repeated feelings of spinning (vertigo). The vertigo usually lasts less than 1 minute. Some movements, such as rolling over in bed, can bring on vertigo.  Diagnosing BPPV  Your primary healthcare provider may diagnose and treat your BPPV. Or you may see an ear, nose, and throat doctor (otolaryngologist). In some cases, you may see a nervous system doctor (neurologist).  The healthcare provider will ask about your symptoms and your medical history. He or she will examine you. You may have hearing and balance tests. As part of the exam, your healthcare provider may have you move your head and body in certain ways. If you have BPPV, the movements can bring on vertigo. Your provider will also look for abnormal movements of your eyes. You may have other tests to check your vestibular or nervous systems.  Treatment for BPPV  Your healthcare provider may try to move the calcium crystals. This is done by having you move your head and neck in certain ways. This treatment is safe and often works well. You may also be told to do these movements at home. You may still have vertigo for a few weeks. Your healthcare provider will recheck your symptoms, usually in about a month. Special physical therapy may also be part of treatment. In rare cases, surgery may be needed for BPPV that does not go away.     When to call the healthcare provider  Call your healthcare provider right away if you have any of these:  Symptoms that do not go away with treatment  Symptoms that get worse  New symptoms   Date Last Reviewed: 5/1/2017

## 2025-04-22 NOTE — TELEPHONE ENCOUNTER
"Patient calling clinic to further discuss symptoms he sent via [employer] (see message).  Reports symptoms he listed are on and off over the last two weeks, nothing severe.  Reports today he went to play golf and ended up stopping early because he felt his \"awareness of where the ball was was slightly off\".  Reports he took his temp and was afebrile.  Reports he is currently sitting in a chair and having no symptoms other than slight fatigue.  Reports occasional discomfort in ear.  Reports he has vertigo 1 out of 20 times when he stands up and occasionally when he lies down to go to sleep.  Reports he has an inverted papilloma removed in February of 2023 that makes him more prone to vertigo.: Reports he does see an ENT every 6 months. Patient states he thinks this is all related to his ear and is wondering if he has an ear infection.  Requests an appointment with a provider for ear evaluation prior to scheduling with ENT to rule out infection.  Patient scheduled with NP for this afternoon. Secure chat sent to NP to approve appt, ok with provider.         Reason for Disposition    All other earaches (Exceptions: Brief ear pain lasting < 1 hour, and earache occurring during air travel.)    Additional Information    Negative: Moving the earlobe or touching the ear clearly increases the pain    Negative: Foreign body stuck in the ear canal (e.g., bug, piece of cotton)    Negative: Sounds like a life-threatening emergency to the triager    Negative: SEVERE earache pain (e.g., excruciating)    Negative: Fever > 103 F (39.4 C)    Negative: Ear pain right after long - thin object was inserted into the ear canal (e.g., paper clip, pencil, Q-tip, wire)    Negative: Pink or red swelling behind the ear    Negative: Stiff neck (can't touch chin to chest)    Negative: Patient sounds very sick or weak to the triager    Negative: White, yellow, or green discharge (pus)    Negative: Diabetes mellitus or a weak immune system (e.g., HIV " positive, cancer chemotherapy, transplant patient)    Negative: Bloody discharge or unexplained bleeding from ear canal    Negative: New blurred vision or vision changes    Protocols used: Earache-A-[place]

## 2025-04-23 LAB
ANION GAP SERPL CALCULATED.3IONS-SCNC: 8 MMOL/L (ref 7–15)
BUN SERPL-MCNC: 17.6 MG/DL (ref 8–23)
CALCIUM SERPL-MCNC: 9.7 MG/DL (ref 8.8–10.4)
CHLORIDE SERPL-SCNC: 100 MMOL/L (ref 98–107)
CREAT SERPL-MCNC: 1.02 MG/DL (ref 0.67–1.17)
EGFRCR SERPLBLD CKD-EPI 2021: 80 ML/MIN/1.73M2
GLUCOSE SERPL-MCNC: 106 MG/DL (ref 70–99)
HCO3 SERPL-SCNC: 29 MMOL/L (ref 22–29)
POTASSIUM SERPL-SCNC: 4.1 MMOL/L (ref 3.4–5.3)
SODIUM SERPL-SCNC: 137 MMOL/L (ref 135–145)

## 2025-04-30 ENCOUNTER — VIRTUAL VISIT (OUTPATIENT)
Dept: INTERNAL MEDICINE | Facility: CLINIC | Age: 70
End: 2025-04-30
Payer: COMMERCIAL

## 2025-04-30 DIAGNOSIS — R42 VERTIGO: Primary | ICD-10-CM

## 2025-04-30 PROCEDURE — 98005 SYNCH AUDIO-VIDEO EST LOW 20: CPT | Performed by: INTERNAL MEDICINE

## 2025-04-30 PROCEDURE — 1126F AMNT PAIN NOTED NONE PRSNT: CPT | Mod: 95 | Performed by: INTERNAL MEDICINE

## 2025-04-30 NOTE — PROGRESS NOTES
Christian is a 69 year old who is being evaluated via a billable video visit.    How would you like to obtain your AVS? MyChart  If the video visit is dropped, the invitation should be resent by: Text to cell phone: [phone]  Will anyone else be joining your video visit? No      Assessment & Plan     (R42) Vertigo  (primary encounter diagnosis)  Comment: gradual improvement, agree, likely BPPV. No red flags  Plan: okay to hold on ENT or physical therapy. Signs and symptoms that would warrant urgent follow up were discussed in detail. Patient voiced understanding.              BMI  Estimated body mass index is 33.01 kg/m  as calculated from the following:    Height as of 4/22/25: 1.829 m (6').    Weight as of 4/22/25: 110.4 kg (243 lb 6.4 oz).             Subjective   Christian is a 69 year old, presenting for the following health issues:  RECHECK      4/30/2025     4:22 PM   Additional Questions   Roomed by Stephen RUELAS RN     Video Start Time:  4:54pm    History of Present Illness       Reason for visit:  Slight dizziness, nighttime vertigo, ear ache  Symptom onset:  1-2 weeks ago  Symptoms include:  At night: spinning sensation in back of head when I awaken. Day: Occasional light headedness or lack of balance. ear ache; slight neck pain  Symptom intensity:  Mild  Symptom progression:  Staying the same  Had these symptoms before:  Yes  Has tried/received treatment for these symptoms:  Yes  Previous treatment was successful:  Yes  Prior treatment description:  Ear infection resolved with antibiotics.  What makes it worse:  Standing in middle of night after waking up  What makes it better:  Lots of liquids and some caffeine   He is taking medications regularly.                    Objective    Vitals - Patient Reported  Systolic (Patient Reported): 125  Diastolic (Patient Reported): 85  Pain Score: No Pain (0)        Physical Exam   GENERAL: alert and no distress  EYES: Eyes grossly normal to inspection.  No discharge or  erythema, or obvious scleral/conjunctival abnormalities.  RESP: No audible wheeze, cough, or visible cyanosis.    SKIN: Visible skin clear. No significant rash, abnormal pigmentation or lesions.  NEURO: Cranial nerves grossly intact.  Mentation and speech appropriate for age.  PSYCH: Appropriate affect, tone, and pace of words          Video-Visit Details    Type of service:  Video Visit   Video End Time:5:07 PM  Originating Location (pt. Location): Home    Distant Location (provider location):  On-site  Platform used for Video Visit: Nery  Signed Electronically by: Rajiv Ludwig MD

## 2025-05-13 ENCOUNTER — TRANSFERRED RECORDS (OUTPATIENT)
Dept: HEALTH INFORMATION MANAGEMENT | Facility: CLINIC | Age: 70
End: 2025-05-13
Payer: COMMERCIAL

## 2025-08-29 ASSESSMENT — SLEEP AND FATIGUE QUESTIONNAIRES
HOW LIKELY ARE YOU TO NOD OFF OR FALL ASLEEP WHILE SITTING INACTIVE IN A PUBLIC PLACE: WOULD NEVER DOZE
HOW LIKELY ARE YOU TO NOD OFF OR FALL ASLEEP WHILE SITTING AND READING: SLIGHT CHANCE OF DOZING
HOW LIKELY ARE YOU TO NOD OFF OR FALL ASLEEP WHILE SITTING QUIETLY AFTER LUNCH WITHOUT ALCOHOL: SLIGHT CHANCE OF DOZING
HOW LIKELY ARE YOU TO NOD OFF OR FALL ASLEEP WHILE WATCHING TV: SLIGHT CHANCE OF DOZING
HOW LIKELY ARE YOU TO NOD OFF OR FALL ASLEEP IN A CAR, WHILE STOPPED FOR A FEW MINUTES IN TRAFFIC: WOULD NEVER DOZE
HOW LIKELY ARE YOU TO NOD OFF OR FALL ASLEEP WHEN YOU ARE A PASSENGER IN A CAR FOR AN HOUR WITHOUT A BREAK: SLIGHT CHANCE OF DOZING
HOW LIKELY ARE YOU TO NOD OFF OR FALL ASLEEP WHILE LYING DOWN TO REST IN THE AFTERNOON WHEN CIRCUMSTANCES PERMIT: MODERATE CHANCE OF DOZING
HOW LIKELY ARE YOU TO NOD OFF OR FALL ASLEEP WHILE SITTING AND TALKING TO SOMEONE: WOULD NEVER DOZE

## 2025-09-03 ENCOUNTER — OFFICE VISIT (OUTPATIENT)
Dept: SLEEP MEDICINE | Facility: CLINIC | Age: 70
End: 2025-09-03
Payer: COMMERCIAL

## 2025-09-03 VITALS
BODY MASS INDEX: 31.68 KG/M2 | SYSTOLIC BLOOD PRESSURE: 138 MMHG | OXYGEN SATURATION: 96 % | DIASTOLIC BLOOD PRESSURE: 81 MMHG | HEIGHT: 73 IN | WEIGHT: 239 LBS | HEART RATE: 75 BPM

## 2025-09-03 DIAGNOSIS — G47.33 OSA (OBSTRUCTIVE SLEEP APNEA): Primary | ICD-10-CM

## 2025-09-03 PROCEDURE — G2211 COMPLEX E/M VISIT ADD ON: HCPCS | Performed by: INTERNAL MEDICINE

## 2025-09-03 PROCEDURE — 3075F SYST BP GE 130 - 139MM HG: CPT | Performed by: INTERNAL MEDICINE

## 2025-09-03 PROCEDURE — 3079F DIAST BP 80-89 MM HG: CPT | Performed by: INTERNAL MEDICINE

## 2025-09-03 PROCEDURE — 99213 OFFICE O/P EST LOW 20 MIN: CPT | Performed by: INTERNAL MEDICINE

## 2025-09-03 PROCEDURE — 1126F AMNT PAIN NOTED NONE PRSNT: CPT | Performed by: INTERNAL MEDICINE

## 2025-09-04 ENCOUNTER — OFFICE VISIT (OUTPATIENT)
Dept: OTOLARYNGOLOGY | Facility: CLINIC | Age: 70
End: 2025-09-04
Attending: STUDENT IN AN ORGANIZED HEALTH CARE EDUCATION/TRAINING PROGRAM
Payer: COMMERCIAL

## 2025-09-04 VITALS
SYSTOLIC BLOOD PRESSURE: 160 MMHG | WEIGHT: 239 LBS | HEIGHT: 72 IN | BODY MASS INDEX: 32.37 KG/M2 | DIASTOLIC BLOOD PRESSURE: 81 MMHG | OXYGEN SATURATION: 94 % | HEART RATE: 72 BPM

## 2025-09-04 DIAGNOSIS — J34.89 NASAL OBSTRUCTION: ICD-10-CM

## 2025-09-04 DIAGNOSIS — D36.9 INVERTED PAPILLOMA: ICD-10-CM

## 2025-09-04 ASSESSMENT — PAIN SCALES - GENERAL: PAINLEVEL_OUTOF10: MILD PAIN (2)

## (undated) DEVICE — BLADE SHAVER SINUS 4MM RAD 12DEG CVD 1884012HR

## (undated) DEVICE — SPECIMEN TRAP STRYKER NEPTUNE IN-LINE 0700-050-000

## (undated) DEVICE — PACK ENT ENDOSCOPY CUSTOM ASC

## (undated) DEVICE — WIPE INSTRUMENT MEROCEL 400200

## (undated) DEVICE — EYE STRIP FLUORESCEIN TEST 1MG BIO-GLO HUO900-11

## (undated) DEVICE — LINEN TOWEL PACK X5 5464

## (undated) DEVICE — TRACKER PATIENT NON-INVASIVE AXIEM 9734887XOM

## (undated) DEVICE — TUBING SUCTION 10'X3/16" N510

## (undated) DEVICE — SUCTION MANIFOLD NEPTUNE 2 SYS 1 PORT 702-025-000

## (undated) DEVICE — SYR 03ML LL W/O NDL 309657

## (undated) DEVICE — ENDO SHEATH STORZ SHARPSITE ENDOSCRUB 0DEG 4MM 1912000

## (undated) DEVICE — LIGHT HANDLE X1 31140133

## (undated) DEVICE — BLADE SHAVER SINUS 4MM RAD 60DEG CVD 1884016HR

## (undated) DEVICE — ESU GROUND PAD ADULT W/CORD E7507

## (undated) DEVICE — DRSG HOLDER NASAL DALE 600

## (undated) DEVICE — SOL NACL 0.9% INJ 1000ML BAG 2B1324X

## (undated) DEVICE — SOL NACL 0.9% IRRIG 500ML BOTTLE 2F7123

## (undated) DEVICE — DRSG NASOPORE FIRM 4CM 5400-020-004

## (undated) DEVICE — SPONGE COTTONOID 2X1/2" 80-1406

## (undated) DEVICE — TRACKER ENT OTS INSTRUMENT FUSION 9733533

## (undated) DEVICE — DRAPE WARMER 66X44" ORS-300

## (undated) DEVICE — SYR 30ML LL W/O NDL 302832

## (undated) DEVICE — NDL 25GA 2"  8881200441

## (undated) DEVICE — GLOVE PROTEXIS MICRO 7.5  2D73PM75

## (undated) DEVICE — TUBING IRRIGATOR STRAIGHTSHOT XPS 1895522

## (undated) RX ORDER — OXYCODONE HYDROCHLORIDE 5 MG/1
TABLET ORAL
Status: DISPENSED
Start: 2023-02-13

## (undated) RX ORDER — ESMOLOL HYDROCHLORIDE 10 MG/ML
INJECTION INTRAVENOUS
Status: DISPENSED
Start: 2023-02-13

## (undated) RX ORDER — OXYMETAZOLINE HYDROCHLORIDE 0.05 G/100ML
SPRAY NASAL
Status: DISPENSED
Start: 2023-02-13

## (undated) RX ORDER — LIDOCAINE HYDROCHLORIDE 10 MG/ML
INJECTION, SOLUTION EPIDURAL; INFILTRATION; INTRACAUDAL; PERINEURAL
Status: DISPENSED
Start: 2023-02-13

## (undated) RX ORDER — PROPOFOL 10 MG/ML
INJECTION, EMULSION INTRAVENOUS
Status: DISPENSED
Start: 2023-02-13

## (undated) RX ORDER — CEFAZOLIN SODIUM 2 G/50ML
SOLUTION INTRAVENOUS
Status: DISPENSED
Start: 2023-02-13

## (undated) RX ORDER — HYDROMORPHONE HYDROCHLORIDE 1 MG/ML
INJECTION, SOLUTION INTRAMUSCULAR; INTRAVENOUS; SUBCUTANEOUS
Status: DISPENSED
Start: 2023-02-13

## (undated) RX ORDER — EPINEPHRINE NASAL SOLUTION 1 MG/ML
SOLUTION NASAL
Status: DISPENSED
Start: 2023-02-13

## (undated) RX ORDER — DEXAMETHASONE SODIUM PHOSPHATE 10 MG/ML
INJECTION, SOLUTION INTRAMUSCULAR; INTRAVENOUS
Status: DISPENSED
Start: 2023-02-13

## (undated) RX ORDER — FENTANYL CITRATE 50 UG/ML
INJECTION, SOLUTION INTRAMUSCULAR; INTRAVENOUS
Status: DISPENSED
Start: 2023-02-13

## (undated) RX ORDER — ONDANSETRON 2 MG/ML
INJECTION INTRAMUSCULAR; INTRAVENOUS
Status: DISPENSED
Start: 2023-02-13

## (undated) RX ORDER — ACETAMINOPHEN 325 MG/1
TABLET ORAL
Status: DISPENSED
Start: 2023-02-13